# Patient Record
Sex: FEMALE | Race: BLACK OR AFRICAN AMERICAN
[De-identification: names, ages, dates, MRNs, and addresses within clinical notes are randomized per-mention and may not be internally consistent; named-entity substitution may affect disease eponyms.]

---

## 2017-03-24 ENCOUNTER — HOSPITAL ENCOUNTER (OUTPATIENT)
Dept: HOSPITAL 23 - CCAT | Age: 52
Discharge: HOME | End: 2017-03-24
Payer: COMMERCIAL

## 2017-03-24 DIAGNOSIS — R31.29: Primary | ICD-10-CM

## 2017-03-24 DIAGNOSIS — R10.9: ICD-10-CM

## 2022-02-23 ENCOUNTER — OFFICE VISIT (OUTPATIENT)
Dept: FAMILY MEDICINE CLINIC | Facility: CLINIC | Age: 57
End: 2022-02-23

## 2022-02-23 VITALS
DIASTOLIC BLOOD PRESSURE: 80 MMHG | OXYGEN SATURATION: 99 % | HEART RATE: 83 BPM | HEIGHT: 64 IN | BODY MASS INDEX: 46.44 KG/M2 | WEIGHT: 272 LBS | SYSTOLIC BLOOD PRESSURE: 140 MMHG

## 2022-02-23 DIAGNOSIS — Z11.59 ENCOUNTER FOR HEPATITIS C SCREENING TEST FOR LOW RISK PATIENT: ICD-10-CM

## 2022-02-23 DIAGNOSIS — R10.2 PELVIC PAIN: Primary | ICD-10-CM

## 2022-02-23 DIAGNOSIS — E66.01 CLASS 3 SEVERE OBESITY DUE TO EXCESS CALORIES WITHOUT SERIOUS COMORBIDITY WITH BODY MASS INDEX (BMI) OF 45.0 TO 49.9 IN ADULT: ICD-10-CM

## 2022-02-23 PROBLEM — E78.2 MIXED HYPERLIPIDEMIA: Status: ACTIVE | Noted: 2020-02-20

## 2022-02-23 PROBLEM — E78.5 HYPERLIPIDEMIA: Status: ACTIVE | Noted: 2020-02-20

## 2022-02-23 PROBLEM — N95.0 POSTMENOPAUSAL BLEEDING: Status: ACTIVE | Noted: 2017-10-18

## 2022-02-23 PROBLEM — J30.1 SEASONAL ALLERGIC RHINITIS DUE TO POLLEN: Status: ACTIVE | Noted: 2019-01-22

## 2022-02-23 PROBLEM — M25.569 KNEE PAIN: Status: ACTIVE | Noted: 2018-01-25

## 2022-02-23 PROBLEM — K21.9 GASTROESOPHAGEAL REFLUX DISEASE: Status: ACTIVE | Noted: 2021-08-04

## 2022-02-23 PROBLEM — J30.9 ALLERGIC RHINITIS: Status: ACTIVE | Noted: 2019-01-22

## 2022-02-23 PROBLEM — E66.813 CLASS 3 SEVERE OBESITY DUE TO EXCESS CALORIES WITHOUT SERIOUS COMORBIDITY WITH BODY MASS INDEX (BMI) OF 45.0 TO 49.9 IN ADULT: Status: ACTIVE | Noted: 2022-02-23

## 2022-02-23 PROCEDURE — 99203 OFFICE O/P NEW LOW 30 MIN: CPT | Performed by: FAMILY MEDICINE

## 2022-02-23 RX ORDER — AZELASTINE HYDROCHLORIDE 0.5 MG/ML
SOLUTION/ DROPS OPHTHALMIC
COMMUNITY
Start: 2021-10-29

## 2022-02-23 RX ORDER — MELATONIN
COMMUNITY
Start: 2022-02-09

## 2022-02-23 RX ORDER — MELOXICAM 15 MG/1
15 TABLET ORAL
COMMUNITY
Start: 2021-10-28 | End: 2022-10-28

## 2022-02-23 RX ORDER — KETOTIFEN FUMARATE 0.35 MG/ML
SOLUTION/ DROPS OPHTHALMIC
COMMUNITY

## 2022-02-23 RX ORDER — MOMETASONE FUROATE 1 MG/G
CREAM TOPICAL DAILY
COMMUNITY
Start: 2021-08-04 | End: 2022-08-04

## 2022-02-23 NOTE — PROGRESS NOTES
"Chief Complaint  Abdominal Pain (pelvic pain, new pt)    Subjective    History of Present Illness {CC  Problem List  Visit  Diagnosis   Encounters  Notes  Medications  Labs  Result Review Imaging  Media :23}     Colette Whitlock presents to [facility] for Abdominal Pain (pelvic pain, new pt).  History of Present Illness     Here today to establish care and with complaint of abdominal/pelvic pain.    Has had some intermittent lower abdominal pelvic pain for several months now. Denies any dysuria, no vaginal discharge. States that she has had similar symptoms in the past, went away with treatment of metronidazole. Has not had periods for many years now though notes that the pain somewhat mirrors menstrual cramps.    Has not had routine blood work done in quite some time, interested in doing some today. Does have a history of hyperlipidemia. Is overdue for routine health screening.    Weight is stable, has been high for quite some time. Does not get much routine exercise.    Objective     Vital Signs:   /80 (BP Location: Left arm, Patient Position: Sitting)   Pulse 83   Ht 162.6 cm (64\")   Wt 123 kg (272 lb)   SpO2 99%   BMI 46.69 kg/m²   Physical Exam  Vitals and nursing note reviewed.   Constitutional:       General: She is not in acute distress.     Appearance: Normal appearance. She is not ill-appearing.   Cardiovascular:      Rate and Rhythm: Normal rate and regular rhythm.      Pulses: Normal pulses.      Heart sounds: Normal heart sounds. No murmur heard.      Pulmonary:      Effort: Pulmonary effort is normal. No respiratory distress.      Breath sounds: Normal breath sounds. No rales.   Neurological:      Mental Status: She is alert and oriented to person, place, and time. Mental status is at baseline.   Psychiatric:         Mood and Affect: Mood normal.         Behavior: Behavior normal.          Result Review  Data Reviewed:{ Labs  Result Review  Imaging "  Med Tab  Media :23}                   Assessment and Plan {CC Problem List  Visit Diagnosis  ROS  Review (Popup)  Health Maintenance  Quality  BestPractice  Medications  SmartSets  SnapShot Encounters  Media :23}   Diagnoses and all orders for this visit:    1. Pelvic pain (Primary)  -     Cancel: Urinalysis With Microscopic - Urine, Clean Catch; Future  -     Urinalysis With Microscopic - Urine, Clean Catch  -     Cancel: NuSwab VG+ - Swab, Vagina; Future  -     NuSwab VG+ - Swab, Vagina    2. Class 3 severe obesity due to excess calories without serious comorbidity with body mass index (BMI) of 45.0 to 49.9 in adult (HCC)  -     Comprehensive Metabolic Panel  -     Lipid Panel    3. Encounter for hepatitis C screening test for low risk patient  -     Hepatitis C Antibody    Orders as above. I will contact her with results as available. Might need to consider transvaginal ultrasound pending on the results of the labs.    Recommended follow-up as below. Encouraged communication via MyWealtht in the meantime.      Follow Up {Instructions Charge Capture  Follow-up Communications :23}     Patient was given instructions and counseling regarding her condition or for health maintenance advice. Please see specific information pulled into the AVS (placed there by myself) if appropriate.    Return in 1 month (on 3/23/2022), or if symptoms worsen or fail to improve, for Preventive Health Maintenance.      DEISY Mcclendon MD

## 2022-02-24 LAB
ALBUMIN SERPL-MCNC: 4.3 G/DL (ref 3.8–4.9)
ALBUMIN/GLOB SERPL: 1.5 {RATIO} (ref 1.2–2.2)
ALP SERPL-CCNC: 123 IU/L (ref 44–121)
ALT SERPL-CCNC: 18 IU/L (ref 0–32)
APPEARANCE UR: ABNORMAL
AST SERPL-CCNC: 19 IU/L (ref 0–40)
BACTERIA #/AREA URNS HPF: ABNORMAL /[HPF]
BILIRUB SERPL-MCNC: <0.2 MG/DL (ref 0–1.2)
BILIRUB UR QL STRIP: NEGATIVE
BUN SERPL-MCNC: 22 MG/DL (ref 6–24)
BUN/CREAT SERPL: 28 (ref 9–23)
CALCIUM SERPL-MCNC: 9.6 MG/DL (ref 8.7–10.2)
CASTS URNS QL MICRO: ABNORMAL /LPF
CHLORIDE SERPL-SCNC: 105 MMOL/L (ref 96–106)
CHOLEST SERPL-MCNC: 198 MG/DL (ref 100–199)
CO2 SERPL-SCNC: 21 MMOL/L (ref 20–29)
COLOR UR: YELLOW
CREAT SERPL-MCNC: 0.79 MG/DL (ref 0.57–1)
EPI CELLS #/AREA URNS HPF: ABNORMAL /HPF (ref 0–10)
GLOBULIN SER CALC-MCNC: 2.9 G/DL (ref 1.5–4.5)
GLUCOSE SERPL-MCNC: 104 MG/DL (ref 65–99)
GLUCOSE UR QL STRIP: NEGATIVE
HCV AB S/CO SERPL IA: <0.1 S/CO RATIO (ref 0–0.9)
HDLC SERPL-MCNC: 55 MG/DL
HGB UR QL STRIP: ABNORMAL
KETONES UR QL STRIP: ABNORMAL
LDLC SERPL CALC-MCNC: 122 MG/DL (ref 0–99)
LEUKOCYTE ESTERASE UR QL STRIP: NEGATIVE
MICRO URNS: ABNORMAL
NITRITE UR QL STRIP: NEGATIVE
PH UR STRIP: 5 [PH] (ref 5–7.5)
POTASSIUM SERPL-SCNC: 4.3 MMOL/L (ref 3.5–5.2)
PROT SERPL-MCNC: 7.2 G/DL (ref 6–8.5)
PROT UR QL STRIP: NEGATIVE
RBC #/AREA URNS HPF: ABNORMAL /HPF (ref 0–2)
SODIUM SERPL-SCNC: 142 MMOL/L (ref 134–144)
SP GR UR STRIP: 1.03 (ref 1–1.03)
TRIGL SERPL-MCNC: 116 MG/DL (ref 0–149)
UROBILINOGEN UR STRIP-MCNC: 0.2 MG/DL (ref 0.2–1)
VLDLC SERPL CALC-MCNC: 21 MG/DL (ref 5–40)
WBC #/AREA URNS HPF: ABNORMAL /HPF (ref 0–5)

## 2022-02-25 ENCOUNTER — PATIENT ROUNDING (BHMG ONLY) (OUTPATIENT)
Dept: FAMILY MEDICINE CLINIC | Facility: CLINIC | Age: 57
End: 2022-02-25

## 2022-02-25 LAB
A VAGINAE DNA VAG QL NAA+PROBE: NORMAL SCORE
BVAB2 DNA VAG QL NAA+PROBE: NORMAL SCORE
C ALBICANS DNA VAG QL NAA+PROBE: NEGATIVE
C GLABRATA DNA VAG QL NAA+PROBE: NEGATIVE
C TRACH DNA VAG QL NAA+PROBE: NORMAL
MEGA1 DNA VAG QL NAA+PROBE: NORMAL SCORE
N GONORRHOEA DNA VAG QL NAA+PROBE: NORMAL
T VAGINALIS DNA VAG QL NAA+PROBE: NORMAL

## 2022-02-25 NOTE — PROGRESS NOTES
February 25, 2022    Hello, may I speak with Colette Whitlock?    My name is Kirti    I am  with [facility]  [address] [facility] [address] [phone].    Before we get started may I verify your date of birth? 1965    I am calling to officially welcome you to our practice and ask about your recent visit. Is this a good time to talk? yes    Tell me about your visit with us. What things went well?  Pt stated that everyone was wonderful and that people were very nice.        We're always looking for ways to make our patients' experiences even better. Do you have recommendations on ways we may improve?  no    Overall were you satisfied with your first visit to our practice? yes       I appreciate you taking the time to speak with me today. Is there anything else I can do for you? no      Thank you, and have a great day.

## 2022-06-30 ENCOUNTER — TELEPHONE (OUTPATIENT)
Dept: FAMILY MEDICINE CLINIC | Facility: CLINIC | Age: 57
End: 2022-06-30

## 2022-06-30 NOTE — TELEPHONE ENCOUNTER
Caller: NYDIA SCHAEFER     Relationship: [unfilled]     Best call back number: [phone]    What is your medical concern? PATIENT HAS RASH ON ARMS, HANDS, AND STOMACH, IS VERY ITCHY AND SEEMS TO BE SPREADING    How long has this issue been going on? 5 DAYS     Is your provider already aware of this issue? NO    Have you been treated for this issue? HAS TRIED ALLERGY MEDICATION AND CREAMS     PATIENT CANNOT COME IN BEFORE 1PM AND DOES NOT DO MYCHART VISITS

## 2022-08-31 ENCOUNTER — OFFICE VISIT (OUTPATIENT)
Dept: FAMILY MEDICINE CLINIC | Facility: CLINIC | Age: 57
End: 2022-08-31

## 2022-08-31 VITALS
DIASTOLIC BLOOD PRESSURE: 78 MMHG | HEART RATE: 78 BPM | WEIGHT: 256 LBS | BODY MASS INDEX: 43.94 KG/M2 | RESPIRATION RATE: 18 BRPM | OXYGEN SATURATION: 99 % | SYSTOLIC BLOOD PRESSURE: 128 MMHG

## 2022-08-31 DIAGNOSIS — E66.01 CLASS 3 SEVERE OBESITY DUE TO EXCESS CALORIES WITHOUT SERIOUS COMORBIDITY WITH BODY MASS INDEX (BMI) OF 40.0 TO 44.9 IN ADULT: ICD-10-CM

## 2022-08-31 DIAGNOSIS — E78.2 MIXED HYPERLIPIDEMIA: Primary | ICD-10-CM

## 2022-08-31 PROBLEM — M19.171 POST-TRAUMATIC OSTEOARTHRITIS, RIGHT ANKLE AND FOOT: Status: ACTIVE | Noted: 2022-07-13

## 2022-08-31 PROCEDURE — 99213 OFFICE O/P EST LOW 20 MIN: CPT | Performed by: FAMILY MEDICINE

## 2022-08-31 NOTE — PROGRESS NOTES
Chief Complaint  Hyperlipidemia    Subjective    History of Present Illness {CC  Problem List  Visit  Diagnosis   Encounters  Notes  Medications  Labs  Result Review Imaging  Media :23}     Colette Whitlock presents to [facility] for Hyperlipidemia.  History of Present Illness     Here with concern for hyperlipidemia after some recent biometric testing. Was found to have elevated total cholesterol and LDL cholesterol. Neither is terribly high, she blames it on recent dietary issues. Has been drinking some sugared drinks, eats a fair amount of brown rice. Daughter, who is with her today, assures me that they are working on dietary changes. Their concern is that they need to start medications right away. Also had an A1c of 5.8, has been prediabetic in the past.    Objective     Vital Signs:   /78   Pulse 78   Resp 18   Wt 116 kg (256 lb)   SpO2 99%   BMI 43.94 kg/m²   Physical Exam  Vitals and nursing note reviewed.   Constitutional:       General: She is not in acute distress.     Appearance: Normal appearance. She is not ill-appearing.   Cardiovascular:      Rate and Rhythm: Normal rate and regular rhythm.      Pulses: Normal pulses.      Heart sounds: Normal heart sounds. No murmur heard.  Pulmonary:      Effort: Pulmonary effort is normal. No respiratory distress.      Breath sounds: Normal breath sounds. No rales.   Neurological:      Mental Status: She is alert and oriented to person, place, and time. Mental status is at baseline.   Psychiatric:         Mood and Affect: Mood normal.         Behavior: Behavior normal.          Result Review  Data Reviewed:{ Labs  Result Review  Imaging  Med Tab  Media :23}                   Assessment and Plan {CC Problem List  Visit Diagnosis  ROS  Review (Popup)  Health Maintenance  Quality  BestPractice  Medications  SmartSets  SnapShot Encounters  Media :23}   Diagnoses and all orders for this visit:    1.  Mixed hyperlipidemia (Primary)    2. Class 3 severe obesity due to excess calories without serious comorbidity with body mass index (BMI) of 40.0 to 44.9 in adult (HCC)    Long discussion today about the interplay between diet and exercise and her concerns. Strongly recommended improving her risk factors through diet and exercise as she can reverse these processes and avoid medications.    Recommended follow-up as below. Encouraged communication via MyChart in the meantime.    Patient was given instructions and counseling regarding her condition or for health maintenance advice. Please see specific information pulled into the AVS (placed there by myself) if appropriate.    Return in about 3 months (around 11/30/2022), or if symptoms worsen or fail to improve, for Preventive Health Maintenance.      DEISY Mcclendon MD

## 2022-12-01 ENCOUNTER — OFFICE VISIT (OUTPATIENT)
Dept: FAMILY MEDICINE CLINIC | Facility: CLINIC | Age: 57
End: 2022-12-01

## 2022-12-01 VITALS
BODY MASS INDEX: 42.17 KG/M2 | HEART RATE: 70 BPM | HEIGHT: 64 IN | OXYGEN SATURATION: 97 % | WEIGHT: 247 LBS | SYSTOLIC BLOOD PRESSURE: 125 MMHG | DIASTOLIC BLOOD PRESSURE: 80 MMHG

## 2022-12-01 DIAGNOSIS — E66.01 CLASS 3 SEVERE OBESITY DUE TO EXCESS CALORIES WITHOUT SERIOUS COMORBIDITY WITH BODY MASS INDEX (BMI) OF 40.0 TO 44.9 IN ADULT: Primary | ICD-10-CM

## 2022-12-01 PROCEDURE — 99213 OFFICE O/P EST LOW 20 MIN: CPT | Performed by: FAMILY MEDICINE

## 2022-12-01 NOTE — PROGRESS NOTES
"Chief Complaint  Follow-up ([facility])    Subjective    History of Present Illness {CC  Problem List  Visit  Diagnosis   Encounters  Notes  Medications  Labs  Result Review Imaging  Media :23}     Colette Whitlock presents to [facility] for Follow-up ([facility]).  History of Present Illness     Here today to follow-up on obesity. Has made significant changes to her diet and is proud that she has lost about 35 pounds this year. Hopeful to continue the trend. Has been eating increased fibrous vegetables and has decreased excess sugars. Will be due for an annual exam in the near future, would like to get some routine blood work checked at that time. No other concerns today.    Objective     Vital Signs:   /80   Pulse 70   Ht 162.6 cm (64\")   Wt 112 kg (247 lb)   SpO2 97%   BMI 42.40 kg/m²   Physical Exam  Vitals and nursing note reviewed.   Constitutional:       General: She is not in acute distress.     Appearance: Normal appearance. She is not ill-appearing.   Cardiovascular:      Rate and Rhythm: Normal rate and regular rhythm.      Pulses: Normal pulses.      Heart sounds: Normal heart sounds. No murmur heard.  Pulmonary:      Effort: Pulmonary effort is normal. No respiratory distress.      Breath sounds: Normal breath sounds. No rales.   Neurological:      Mental Status: She is alert and oriented to person, place, and time. Mental status is at baseline.   Psychiatric:         Mood and Affect: Mood normal.         Behavior: Behavior normal.          Result Review  Data Reviewed:{ Labs  Result Review  Imaging  Med Tab  Media :23}                   Assessment and Plan {CC Problem List  Visit Diagnosis  ROS  Review (Popup)  Health Maintenance  Quality  BestPractice  Medications  SmartSets  SnapShot Encounters  Media :23}   Diagnoses and all orders for this visit:    1. Class 3 severe obesity due to excess calories without serious comorbidity with body mass " index (BMI) of 40.0 to 44.9 in adult (HCC) (Primary)    Congratulated on her progress, encouraged to continue. Recommended follow-up as below. Encouraged communication via MyChart in the meantime.    Patient was given instructions and counseling regarding her condition or for health maintenance advice. Please see specific information pulled into the AVS (placed there by myself) if appropriate.    Return in about 3 months (around 3/1/2023) for Preventive Health Maintenance.      DEISY Mcclendon MD

## 2023-03-16 ENCOUNTER — OFFICE VISIT (OUTPATIENT)
Dept: FAMILY MEDICINE CLINIC | Facility: CLINIC | Age: 58
End: 2023-03-16
Payer: COMMERCIAL

## 2023-03-16 VITALS
SYSTOLIC BLOOD PRESSURE: 122 MMHG | HEART RATE: 68 BPM | BODY MASS INDEX: 41.88 KG/M2 | OXYGEN SATURATION: 98 % | WEIGHT: 244 LBS | DIASTOLIC BLOOD PRESSURE: 72 MMHG | RESPIRATION RATE: 19 BRPM

## 2023-03-16 DIAGNOSIS — Z00.00 ROUTINE HEALTH MAINTENANCE: Primary | ICD-10-CM

## 2023-03-16 DIAGNOSIS — Z12.11 SCREENING FOR COLON CANCER: ICD-10-CM

## 2023-03-16 DIAGNOSIS — Z13.1 SCREENING FOR DIABETES MELLITUS: ICD-10-CM

## 2023-03-16 DIAGNOSIS — Z12.31 ENCOUNTER FOR SCREENING MAMMOGRAM FOR MALIGNANT NEOPLASM OF BREAST: ICD-10-CM

## 2023-03-16 DIAGNOSIS — E78.2 MIXED HYPERLIPIDEMIA: ICD-10-CM

## 2023-03-16 PROCEDURE — 99396 PREV VISIT EST AGE 40-64: CPT | Performed by: FAMILY MEDICINE

## 2023-03-16 NOTE — PROGRESS NOTES
Chief Complaint  Annual Exam    Subjective    History of Present Illness {CC  Problem List  Visit  Diagnosis   Encounters  Notes  Medications  Labs  Result Review Imaging  Media :23}     Colette Whitlock presents to [facility] for Annual Exam.  History of Present Illness     Here today for annual exam and discuss preventive health maintenance.    Doing very well today. Very happy with the progress that she has made. Has lost almost 30 pounds now. Very excited to see what her labs look like.    Due for his routine blood work today. Also due for mammogram and colon cancer screening. Is low risk for the latter with no family history and no significant signs or symptoms. Would prefer Cologuard if possible.    Continues to work on diet and exercise. Again happy with her progress.    Has good family and social support. 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    Objective     Vital Signs:   /72   Pulse 68   Resp 19   Wt 111 kg (244 lb)   SpO2 98%   BMI 41.88 kg/m²   Physical Exam  Vitals and nursing note reviewed.   Constitutional:       General: She is not in acute distress.     Appearance: Normal appearance. She is not ill-appearing.   Cardiovascular:      Rate and Rhythm: Normal rate and regular rhythm.      Pulses: Normal pulses.      Heart sounds: Normal heart sounds. No murmur heard.  Pulmonary:      Effort: Pulmonary effort is normal. No respiratory distress.      Breath sounds: Normal breath sounds. No rales.   Neurological:      Mental Status: She is alert and oriented to person, place, and time. Mental status is at baseline.   Psychiatric:         Mood and Affect: Mood normal.         Behavior: Behavior normal.          Result Review  Data Reviewed:{ Labs  Result Review  Imaging  Med Tab  Media :23}                   Assessment and Plan {CC Problem List  Visit Diagnosis  ROS  Review (Popup)  Health Maintenance  Quality  BestPractice  Medications   [employer]  SnapShot Encounters  Media :23}   Diagnoses and all orders for this visit:    1. Routine health maintenance (Primary)    2. Mixed hyperlipidemia  -     Comprehensive Metabolic Panel  -     Lipid Panel    3. Screening for diabetes mellitus  -     Comprehensive Metabolic Panel    4. Encounter for screening mammogram for malignant neoplasm of breast  -     Mammo Screening Bilateral With CAD; Future    5. Screening for colon cancer  -     Cologuard - Stool, Per Rectum; Future    Orders as above. I will contact her with results as available. Continue with excellent changes to lifestyle.    Recommended follow-up as below. Encouraged communication via Nimayat in the meantime.    Patient was given instructions and counseling regarding her condition or for health maintenance advice. Please see specific information pulled into the AVS (placed there by myself) if appropriate.    Return in about 1 year (around 3/16/2024) for Preventive Health Maintenance.      DEISY Mcclendon MD    Prevention counseling performed as below: Mindfulness for stress management.

## 2023-03-17 LAB
ALBUMIN SERPL-MCNC: 4.4 G/DL (ref 3.8–4.9)
ALBUMIN/GLOB SERPL: 1.7 {RATIO} (ref 1.2–2.2)
ALP SERPL-CCNC: 97 IU/L (ref 44–121)
ALT SERPL-CCNC: 16 IU/L (ref 0–32)
AST SERPL-CCNC: 15 IU/L (ref 0–40)
BILIRUB SERPL-MCNC: 0.2 MG/DL (ref 0–1.2)
BUN SERPL-MCNC: 21 MG/DL (ref 6–24)
BUN/CREAT SERPL: 26 (ref 9–23)
CALCIUM SERPL-MCNC: 9.5 MG/DL (ref 8.7–10.2)
CHLORIDE SERPL-SCNC: 106 MMOL/L (ref 96–106)
CHOLEST SERPL-MCNC: 181 MG/DL (ref 100–199)
CO2 SERPL-SCNC: 22 MMOL/L (ref 20–29)
CREAT SERPL-MCNC: 0.81 MG/DL (ref 0.57–1)
EGFRCR SERPLBLD CKD-EPI 2021: 84 ML/MIN/1.73
GLOBULIN SER CALC-MCNC: 2.6 G/DL (ref 1.5–4.5)
GLUCOSE SERPL-MCNC: 88 MG/DL (ref 70–99)
HDLC SERPL-MCNC: 54 MG/DL
LDLC SERPL CALC-MCNC: 112 MG/DL (ref 0–99)
POTASSIUM SERPL-SCNC: 4.4 MMOL/L (ref 3.5–5.2)
PROT SERPL-MCNC: 7 G/DL (ref 6–8.5)
SODIUM SERPL-SCNC: 142 MMOL/L (ref 134–144)
TRIGL SERPL-MCNC: 83 MG/DL (ref 0–149)
VLDLC SERPL CALC-MCNC: 15 MG/DL (ref 5–40)

## 2023-04-04 ENCOUNTER — TRANSCRIBE ORDERS (OUTPATIENT)
Dept: ADMINISTRATIVE | Facility: HOSPITAL | Age: 58
End: 2023-04-04
Payer: COMMERCIAL

## 2023-04-04 DIAGNOSIS — Z12.31 SCREENING MAMMOGRAM, ENCOUNTER FOR: Primary | ICD-10-CM

## 2023-04-11 ENCOUNTER — HOSPITAL ENCOUNTER (OUTPATIENT)
Dept: MAMMOGRAPHY | Facility: HOSPITAL | Age: 58
Discharge: HOME | End: 2023-04-11
Admitting: FAMILY MEDICINE
Payer: COMMERCIAL

## 2023-04-11 DIAGNOSIS — Z12.31 SCREENING MAMMOGRAM, ENCOUNTER FOR: ICD-10-CM

## 2023-04-11 PROCEDURE — 77063 BREAST TOMOSYNTHESIS BI: CPT

## 2023-04-11 PROCEDURE — 77067 SCR MAMMO BI INCL CAD: CPT

## 2023-04-19 ENCOUNTER — OFFICE VISIT (OUTPATIENT)
Dept: FAMILY MEDICINE CLINIC | Facility: CLINIC | Age: 58
End: 2023-04-19
Payer: COMMERCIAL

## 2023-04-19 VITALS
WEIGHT: 243 LBS | HEART RATE: 70 BPM | OXYGEN SATURATION: 98 % | DIASTOLIC BLOOD PRESSURE: 72 MMHG | BODY MASS INDEX: 41.71 KG/M2 | SYSTOLIC BLOOD PRESSURE: 122 MMHG | RESPIRATION RATE: 19 BRPM

## 2023-04-19 DIAGNOSIS — R05.3 CHRONIC COUGH: Primary | ICD-10-CM

## 2023-04-19 PROCEDURE — 99213 OFFICE O/P EST LOW 20 MIN: CPT | Performed by: FAMILY MEDICINE

## 2023-04-19 RX ORDER — BENZONATATE 100 MG/1
100 CAPSULE ORAL 3 TIMES DAILY PRN
Qty: 30 CAPSULE | Refills: 0 | Status: SHIPPED | OUTPATIENT
Start: 2023-04-19

## 2023-04-19 RX ORDER — MONTELUKAST SODIUM 10 MG/1
10 TABLET ORAL NIGHTLY
Qty: 30 TABLET | Refills: 0 | Status: SHIPPED | OUTPATIENT
Start: 2023-04-19

## 2023-04-19 NOTE — PROGRESS NOTES
Chief Complaint  Cough (Off and on since March )    Subjective    History of Present Illness {CC  Problem List  Visit  Diagnosis   Encounters  Notes  Medications  Labs  Result Review Imaging  Media :23}     Colette Whitlock presents to [facility] for Cough (Off and on since March ).  History of Present Illness     Here today with complaint as above. Cough started after an upper respiratory infection in early March. Has continued since, most frequently a dry nagging cough. Seems to be getting very slowly better however there is some concern about possible bronchitis or pneumonia. No fevers or chills, no shortness of breath. Does have some mild allergy symptoms and has been treated for these in the past. No reflux or wheezing.    Objective     Vital Signs:   /72   Pulse 70   Resp 19   Wt 110 kg (243 lb)   SpO2 98%   BMI 41.71 kg/m²   Physical Exam  Vitals and nursing note reviewed.   Constitutional:       General: She is not in acute distress.     Appearance: Normal appearance. She is not ill-appearing.   Cardiovascular:      Rate and Rhythm: Normal rate and regular rhythm.      Pulses: Normal pulses.      Heart sounds: Normal heart sounds. No murmur heard.  Pulmonary:      Effort: Pulmonary effort is normal. No respiratory distress.      Breath sounds: Normal breath sounds. No stridor. No wheezing or rhonchi.   Neurological:      Mental Status: She is alert.          Result Review  Data Reviewed:{ Labs  Result Review  Imaging  Med Tab  Media :23}                   Assessment and Plan {CC Problem List  Visit Diagnosis  ROS  Review (Popup)  Health Maintenance  Quality  BestPractice  Medications  SmartSets  SnapShot Encounters  Media :23}   Diagnoses and all orders for this visit:    1. Chronic cough (Primary)  -     montelukast (Singulair) 10 MG tablet; Take 1 tablet by mouth Every Night.  Dispense: 30 tablet; Refill: 0  -     benzonatate (Tessalon  Perles) 100 MG capsule; Take 1 capsule by mouth 3 (Three) Times a Day As Needed for Cough.  Dispense: 30 capsule; Refill: 0    Will treat with benzonatate and montelukast as above. We will do montelukast for a month and then can discuss possible chronic use. She will keep me updated with her progress.    Patient was given instructions and counseling regarding her condition or for health maintenance advice. Please see specific information pulled into the AVS (placed there by myself) if appropriate.    Return if symptoms worsen or fail to improve.      DEISY Mcclendon MD

## 2023-05-12 DIAGNOSIS — R05.3 CHRONIC COUGH: ICD-10-CM

## 2023-05-12 RX ORDER — MONTELUKAST SODIUM 10 MG/1
TABLET ORAL
Qty: 30 TABLET | Refills: 0 | Status: SHIPPED | OUTPATIENT
Start: 2023-05-12

## 2023-06-12 DIAGNOSIS — R05.3 CHRONIC COUGH: ICD-10-CM

## 2023-06-12 RX ORDER — MONTELUKAST SODIUM 10 MG/1
TABLET ORAL
Qty: 30 TABLET | Refills: 0 | Status: SHIPPED | OUTPATIENT
Start: 2023-06-12

## 2023-07-27 DIAGNOSIS — R05.3 CHRONIC COUGH: ICD-10-CM

## 2023-07-27 RX ORDER — MONTELUKAST SODIUM 10 MG/1
10 TABLET ORAL
Qty: 90 TABLET | Refills: 3 | Status: SHIPPED | OUTPATIENT
Start: 2023-07-27

## 2023-08-17 ENCOUNTER — OFFICE VISIT (OUTPATIENT)
Dept: FAMILY MEDICINE CLINIC | Facility: CLINIC | Age: 58
End: 2023-08-17
Payer: COMMERCIAL

## 2023-08-17 VITALS
HEART RATE: 62 BPM | DIASTOLIC BLOOD PRESSURE: 82 MMHG | BODY MASS INDEX: 40.97 KG/M2 | WEIGHT: 240 LBS | SYSTOLIC BLOOD PRESSURE: 122 MMHG | OXYGEN SATURATION: 98 % | RESPIRATION RATE: 20 BRPM | HEIGHT: 64 IN

## 2023-08-17 DIAGNOSIS — R10.9 ABDOMINAL CRAMPING: Primary | ICD-10-CM

## 2023-08-17 PROCEDURE — 99213 OFFICE O/P EST LOW 20 MIN: CPT | Performed by: FAMILY MEDICINE

## 2023-08-17 NOTE — PROGRESS NOTES
"Chief Complaint  Abdominal Pain and Labs Only (Abnormal biometric screening )    Subjective    History of Present Illness {CC  Problem List  Visit  Diagnosis   Encounters  Notes  Medications  Labs  Result Review Imaging  Media :23}     Colette Whitlock presents to [facility] for Abdominal Pain and Labs Only (Abnormal biometric screening ).  History of Present Illness     Here today with concern as above. Has been doing fairly well overall. Continues to encounter some occasional abdominal discomfort and intermittent constipation and diarrhea. Has not been able to identify any specific triggers. Has normal bowel movements 90% of the time. Occasionally has some abdominal cramping followed by some diarrhea. Continues to eat a vegetable and fiber heavy diet.    Had some biometric screening done at work. A few labs were slightly abnormal and she is wondering my opinion. Cholesterol is overall stable as is her fasting glucose.    Objective     Vital Signs:   /82   Pulse 62   Resp 20   Ht 162.6 cm (64\")   Wt 109 kg (240 lb)   SpO2 98%   BMI 41.20 kg/mý   Physical Exam  Vitals and nursing note reviewed.   Constitutional:       General: She is not in acute distress.     Appearance: Normal appearance. She is not ill-appearing.   Cardiovascular:      Rate and Rhythm: Normal rate and regular rhythm.      Pulses: Normal pulses.      Heart sounds: Normal heart sounds. No murmur heard.  Pulmonary:      Effort: Pulmonary effort is normal. No respiratory distress.      Breath sounds: Normal breath sounds. No rales.   Neurological:      Mental Status: She is alert and oriented to person, place, and time. Mental status is at baseline.   Psychiatric:         Mood and Affect: Mood normal.         Behavior: Behavior normal.        Result Review  Data Reviewed:{ Labs  Result Review  Imaging  Med Tab  Media :23}                   Assessment and Plan {CC Problem List  Visit " Diagnosis  ROS  Review (Popup)  Health Maintenance  Quality  BestPractice  Medications  SmartSets  SnapShot Encounters  Media :23}   Diagnoses and all orders for this visit:    1. Abdominal cramping (Primary)    My guess is that her abdominal cramping and bloating are diet related. Recommended a symptom diary so that we may further characterize the issue and identify triggers.    Recommended follow-up as below. Encouraged communication via ViShart in the meantime.          Patient was given instructions and counseling regarding her condition or for health maintenance advice. Please see specific information pulled into the AVS (placed there by myself) if appropriate.    Return if symptoms worsen or fail to improve.      DEISY Mcclendon MD

## 2024-01-08 ENCOUNTER — OFFICE VISIT (OUTPATIENT)
Dept: FAMILY MEDICINE CLINIC | Facility: CLINIC | Age: 59
End: 2024-01-08
Payer: COMMERCIAL

## 2024-01-08 VITALS
SYSTOLIC BLOOD PRESSURE: 148 MMHG | RESPIRATION RATE: 18 BRPM | HEART RATE: 97 BPM | OXYGEN SATURATION: 99 % | BODY MASS INDEX: 42 KG/M2 | HEIGHT: 64 IN | DIASTOLIC BLOOD PRESSURE: 92 MMHG | WEIGHT: 246 LBS

## 2024-01-08 DIAGNOSIS — M25.511 CHRONIC RIGHT SHOULDER PAIN: Primary | ICD-10-CM

## 2024-01-08 DIAGNOSIS — G89.29 CHRONIC RIGHT SHOULDER PAIN: Primary | ICD-10-CM

## 2024-01-08 PROCEDURE — 99214 OFFICE O/P EST MOD 30 MIN: CPT | Performed by: NURSE PRACTITIONER

## 2024-01-08 RX ORDER — MELOXICAM 15 MG/1
15 TABLET ORAL DAILY
Qty: 30 TABLET | Refills: 1 | Status: SHIPPED | OUTPATIENT
Start: 2024-01-08 | End: 2024-03-08

## 2024-01-08 RX ORDER — MELOXICAM 15 MG/1
15 TABLET ORAL
COMMUNITY
Start: 2023-12-21 | End: 2024-01-08 | Stop reason: SDUPTHER

## 2024-01-08 NOTE — PROGRESS NOTES
Orly Whitlock is a 58 y.o. female.     History of Present Illness   Patient presents with c/o right shoulder pain, ongoing, acute for a few months. She has tried bracing her arm and applying heat, but this has not helped. She is unable to raise her right arm above her head and it's difficult for her to  objects. She has used castor oil and topical voltaren gel. She takes meloxicam 15 mg 1 p.o. QD  The following portions of the patient's history were reviewed and updated as appropriate: allergies, current medications, past family history, past medical history, past social history, past surgical history and problem list.    Review of Systems   Constitutional:  Negative for chills, fatigue and fever.   Respiratory:  Negative for cough, chest tightness, shortness of breath and wheezing.    Cardiovascular:  Negative for chest pain, palpitations and leg swelling.   Musculoskeletal:  Positive for arthralgias (Right shoulder).   Neurological:  Negative for dizziness and headache.   Hematological: Negative.    Psychiatric/Behavioral: Negative.  Negative for sleep disturbance.        Objective   Physical Exam  Vitals and nursing note reviewed.   Constitutional:       Appearance: She is well-developed.   HENT:      Head: Normocephalic and atraumatic.   Eyes:      Conjunctiva/sclera: Conjunctivae normal.      Pupils: Pupils are equal, round, and reactive to light.   Cardiovascular:      Rate and Rhythm: Normal rate and regular rhythm.      Heart sounds: Normal heart sounds. No murmur heard.  Pulmonary:      Effort: Pulmonary effort is normal.      Breath sounds: Normal breath sounds.   Musculoskeletal:      Right shoulder: Tenderness present. No swelling, deformity, effusion, laceration or crepitus. Decreased range of motion. Normal strength. Normal pulse.   Neurological:      Mental Status: She is alert and oriented to person, place, and time.   Psychiatric:         Behavior: Behavior normal.          Thought Content: Thought content normal.         Judgment: Judgment normal.         Vitals:    01/08/24 1427   BP: 148/92   Pulse: 97   Resp: 18   SpO2: 99%     Body mass index is 42.23 kg/m².      Procedures    Assessment & Plan   Problems Addressed this Visit    None  Visit Diagnoses       Chronic right shoulder pain    -  Primary    Relevant Medications    meloxicam (MOBIC) 15 MG tablet    Diclofenac Sodium (Voltaren Arthritis Pain) 1 % gel gel    Other Relevant Orders    Ambulatory Referral to Orthopedic Surgery          Diagnoses         Codes Comments    Chronic right shoulder pain    -  Primary ICD-10-CM: M25.511, G89.29  ICD-9-CM: 719.41, 338.29           Meloxicam 15 mg 1 p.o. QD  Referral to orthopedic  Topical voltaren gel BID         Return if symptoms worsen or fail to improve.

## 2024-01-12 NOTE — PROGRESS NOTES
New Shoulder      Patient: Colette Whitlock        YOB: 1965    Medical Record Number: [id number]        Chief Complaints:   Right shoulder pain    History of Present Illness: This is a very nice 58-year-old female is right-hand dominant who is from her [place] who speaks very good English but her daughter is here to help translate if needed.  Patient states her shoulders been hurting her for 3 months she is tried ice heat there is not 1 particular event or injury however she does describe to me what she does for work she lifts a lot of things overhead does a lot of folding of close and repetitive activity and I suspect this is her source.  I did relay this to the patient.  Her symptoms are moderate intermittent she is continue to work through it and feels like she can only actively flex to about 90 degrees.  Her past medical history is marked for prediabetes      Allergies:   Allergies   Allergen Reactions    Penicillin G Unknown (See Comments)     Told this as a child       Medications:   Home Medications:  Current Outpatient Medications on File Prior to Visit   Medication Sig    meloxicam (MOBIC) 15 MG tablet Take 1 tablet by mouth Daily for 60 days.    Diclofenac Sodium (Voltaren Arthritis Pain) 1 % gel gel Apply 4 g topically to the appropriate area as directed 2 (Two) Times a Day. (Patient not taking: Reported on 1/15/2024)    ketotifen (ZADITOR) 0.025 % ophthalmic solution Apply  to eye(s) as directed by provider. (Patient not taking: Reported on 1/15/2024)    montelukast (SINGULAIR) 10 MG tablet Take 1 tablet by mouth every night at bedtime. (Patient not taking: Reported on 1/15/2024)    psyllium (Metamucil Smooth Texture) 58.6 % powder 1 large tablespoon mixed in 12-15 ounces of water every morning. (Patient not taking: Reported on 1/15/2024)     No current facility-administered medications on file prior to visit.     Current Medications:  Scheduled Meds:  Continuous Infusions:No current  "facility-administered medications for this visit.    PRN Meds:.    Past Medical History:   Diagnosis Date    Fracture, foot     Frozen shoulder     Knee swelling     Prediabetes         Past Surgical History:   Procedure Laterality Date    ANKLE OPEN REDUCTION INTERNAL FIXATION       SECTION          Social History     Occupational History    Not on file   Tobacco Use    Smoking status: Never    Smokeless tobacco: Never   Substance and Sexual Activity    Alcohol use: Not Currently    Drug use: Never    Sexual activity: Yes     Partners: Male     Comment: , three children      Social History     Social History Narrative    Not on file        Family History   Problem Relation Age of Onset    No Known Problems Mother     No Known Problems Father     Breast cancer Neg Hx     Colon cancer Neg Hx              Review of Systems:     Review of Systems      Physical Exam: 58 y.o. female  General Appearance:    Alert, cooperative, in no acute distress                   Vitals:    01/15/24 1423   Temp: 98.2 °F (36.8 °C)   Weight: 115 kg (253 lb 3.2 oz)   Height: 162.6 cm (64\")   PainSc:   7      Patient is alert and read ×3 no acute distress appears her above-listed at height weight and age.  Affect is normal respiratory rate is normal unlabored. Heart rate regular rate rhythm, sclera, dentition and hearing are normal for the purpose of this exam.    Ortho Exam  Physical exam of the right shoulder reveals no overlying skin changes no lymphedema no lymphadenopathy.  Patient has active flexion 180 with mild symptoms abduction is similar external rotation is to 50 and internal rotation to the upper lumbar spine with mild symptoms.  Patient has good rotator cuff strength 4+ over 5 with isometric strength testing with pain.  Patient has a positive impingement and a positive De Santiago sign.  Patient has good cervical range of motion which is full and asymptomatic no radicular symptoms.  Patient has a normal elbow exam.  " Good distal pulses are present  Patient has pain with overhead activity and a positive Neer sign and a positive empty can sign , a positive drop arm and a definitive painful arc   Large Joint Arthrocentesis: R subacromial bursa  Date/Time: 1/15/2024 2:47 PM  Consent given by: patient  Site marked: site marked  Timeout: Immediately prior to procedure a time out was called to verify the correct patient, procedure, equipment, support staff and site/side marked as required   Supporting Documentation  Indications: pain   Procedure Details  Location: shoulder - R subacromial bursa  Preparation: Patient was prepped and draped in the usual sterile fashion  Needle gauge: 21G.  Approach: posterior  Medications administered: 80 mg methylPREDNISolone acetate 80 MG/ML; 2 mL lidocaine PF 1% 1 %  Patient tolerance: patient tolerated the procedure well with no immediate complications              Radiology:   AP, Scapular Y and Axillary Lateral of the right shoulder were ordered/reviewed to evauate shoulder pain.  I have no comparative films she has some acromioclavicular arthritis otherwise no acute bony pathology  Imaging Results (Most Recent)       Procedure Component Value Units Date/Time    XR Shoulder 2+ View Right [[id number]] Resulted: 01/15/24 1446     Updated: 01/15/24 1448    Impression:      Ordering physician's impression is located in the Encounter Note dated 01/15/24. X-ray performed in the DR room.          Assessment/Plan: Right shoulder pain I think this is rotator cuff hopefully is just significantly inflamed giving rise to loss of active range of motion.  Plan is to proceed with an injection as a diagnostic and therapeutic tool I will then have her see physical therapy to work on cuff and core strengthening I will take her off work at this time because I think she will need that in order to improve.  Should she fail to improve in a lasting fashion with the above listed regimen would consider full-thickness  rotator cuff tear more likely and would do an MRI  Cortisone Injection. See procedure note.  Cortisone Injection for DIAGNOSTIC and THERAPUTIC purposes.

## 2024-01-15 ENCOUNTER — OFFICE VISIT (OUTPATIENT)
Dept: ORTHOPEDIC SURGERY | Facility: CLINIC | Age: 59
End: 2024-01-15
Payer: COMMERCIAL

## 2024-01-15 VITALS — TEMPERATURE: 98.2 F | WEIGHT: 253.2 LBS | BODY MASS INDEX: 43.23 KG/M2 | HEIGHT: 64 IN

## 2024-01-15 DIAGNOSIS — S46.011A TRAUMATIC TEAR OF RIGHT ROTATOR CUFF, UNSPECIFIED TEAR EXTENT, INITIAL ENCOUNTER: Primary | ICD-10-CM

## 2024-01-15 RX ADMIN — LIDOCAINE HYDROCHLORIDE 2 ML: 10 INJECTION, SOLUTION EPIDURAL; INFILTRATION; INTRACAUDAL; PERINEURAL at 14:47

## 2024-01-15 RX ADMIN — METHYLPREDNISOLONE ACETATE 80 MG: 80 INJECTION, SUSPENSION INTRA-ARTICULAR; INTRALESIONAL; INTRAMUSCULAR; SOFT TISSUE at 14:47

## 2024-01-15 NOTE — LETTER
January 15, 2024     Patient: Colette Whitlock   YOB: 1965   Date of Visit: 1/15/2024       To Whom It May Concern:    It is my medical opinion that Colette Whitlock  will be off work until follow up in 4 weeks.           Sincerely,        Makenzie Barnett MD    CC:   No Recipients

## 2024-01-16 ENCOUNTER — PATIENT ROUNDING (BHMG ONLY) (OUTPATIENT)
Dept: ORTHOPEDIC SURGERY | Facility: CLINIC | Age: 59
End: 2024-01-16
Payer: COMMERCIAL

## 2024-01-16 RX ORDER — METHYLPREDNISOLONE ACETATE 80 MG/ML
80 INJECTION, SUSPENSION INTRA-ARTICULAR; INTRALESIONAL; INTRAMUSCULAR; SOFT TISSUE
Status: COMPLETED | OUTPATIENT
Start: 2024-01-15 | End: 2024-01-15

## 2024-01-16 RX ORDER — LIDOCAINE HYDROCHLORIDE 10 MG/ML
2 INJECTION, SOLUTION EPIDURAL; INFILTRATION; INTRACAUDAL; PERINEURAL
Status: COMPLETED | OUTPATIENT
Start: 2024-01-15 | End: 2024-01-15

## 2024-01-16 NOTE — PROGRESS NOTES
A [employer] Message has been sent to the patient for PATIENT ROUNDING with [facility]

## 2024-01-22 ENCOUNTER — TREATMENT (OUTPATIENT)
Dept: PHYSICAL THERAPY | Facility: CLINIC | Age: 59
End: 2024-01-22
Payer: COMMERCIAL

## 2024-01-22 DIAGNOSIS — S46.011D TRAUMATIC TEAR OF RIGHT ROTATOR CUFF, UNSPECIFIED TEAR EXTENT, SUBSEQUENT ENCOUNTER: Primary | ICD-10-CM

## 2024-01-22 NOTE — PROGRESS NOTES
Physical Therapy Initial Evaluation and Plan of Care  [address], [address]  [place] [address]    Patient: Colette Whitlock   : 1965  Diagnosis/ICD-10 Code:  Traumatic tear of right rotator cuff, unspecified tear extent, subsequent encounter [S46.011D]  Referring practitioner: Makenzie Barnett MD  Date of Initial Visit: 2024  Today's Date: 2024  Patient seen for 1 session         Visit Diagnoses:    ICD-10-CM ICD-9-CM   1. Traumatic tear of right rotator cuff, unspecified tear extent, subsequent encounter  S46.011D V58.89     840.4         Subjective Questionnaire: QuickDASH: 77.78      Subjective Evaluation    History of Present Illness  Mechanism of injury: Patient reports that her right shoulder has bothered her for about 3 months.  Denies any injury to the shoulder.  Tried some things at home, but didn't help.  Saw her PCP, then saw the ortho.  Had injection in the right shoulder, which has helped with the pain.    Denies any previous injuries or surgeries.      Patient Occupation: [employer]   Precautions and Work Restrictions: currently not workingPain  Current pain ratin  At worst pain ratin  Location: right anterior shoulder  Quality: unable.  Relieving factors: heat  Aggravating factors: movement    Hand dominance: right             Objective          Observations     Additional Shoulder Observation Details  Patient is in no acute distress.  Minimal protracted shoulders.    Palpation     Additional Palpation Details  Minimal TTP to the right LHB tendon.    Active Range of Motion     Right Shoulder   Flexion: Right shoulder active forward flexion: about 85. with pain  Abduction: Right shoulder active abduction: about 90. with pain  External rotation 0°: Right shoulder active external rotation at 0 degrees: WNL.   Internal rotation BTB: Active internal rotation behind the back: N/T at this time.     Strength/Myotome Testing     Right Shoulder     Planes of Motion    External rotation at 0°: 4   Internal rotation at 45°: 4+     Isolated Muscles   Supraspinatus: 4     Tests     Right Shoulder   Positive empty can and Hawkin's.           Assessment & Plan       Assessment  Impairments: abnormal or restricted ROM, activity intolerance, impaired physical strength, lacks appropriate home exercise program and pain with function   Assessment details: Patient presents with c/o pain, TTP, limited AROM, decreased strength and positive special testing which is limiting her ability to use the right UE with ADL'S.  Barriers to therapy: none  Prognosis: good  Prognosis details: STG's to be met by 2 weeks  1)  Independent with HEP  2)  Decrease pain by 50% or more  3)  AROM right shoulder flexion to 120  4)  AROM right shoulder abd to 120    LTG's to be met by 4 weeks  1)  Independent with HEP progression  2)  Decrease pain by 75% or more  3)  AROM right shoulder flexion and abd to 145 or more to allow for return to functional activities   4)  Increase strength for the right shoulder to 4/5 or more  5)  Negative special testing  6)  No TTP present to indicate improved healing response  7)  Patient to return to ADL'S with minimal limitations         Plan  Therapy options: will be seen for skilled therapy services  Planned therapy interventions: strengthening, stretching, therapeutic activities, home exercise program, manual therapy and neuromuscular re-education  Frequency: 2x week  Duration in weeks: 4  Treatment plan discussed with: patient            Timed:         Manual Therapy:    0     mins  74811;     Therapeutic Exercise:    8     mins  08234;    Neuromuscular Doreen:    8    mins  80138;    Therapeutic Activity:     8     mins  96234;     Gait Trainin     mins  24929;     Ultrasound:     0     mins  99152;          Un-Timed:  Electrical Stimulation:    0     mins  27310 ( );    Low Eval     12     Mins  79670  Mod Eval     0     Mins  35351  High Eval                        0     Mins  48821        Timed Treatment:   24   mins   Total Treatment:     36   mins          PT: Mich Cruz, PT     [place] License [id number]  Electronically signed by Mich Cruz PT, 01/22/24, 9:01 AM EST    Certification Period: 1/22/2024 thru 4/20/2024  I certify that the therapy services are furnished while this patient is under my care.  The services outlined above are required by this patient, and will be reviewed every 90 days.    Makenzie Barnett Md  [address] [place] [address]   NPI: [id number]      Mich Cruz PT   License number: [id number]        Physician Signature:__________________________________________________    PHYSICIAN: Makenzie Barnett MD      DATE:     Please sign and return via fax to [email or web address] . Thank you, [facility].

## 2024-01-24 ENCOUNTER — TREATMENT (OUTPATIENT)
Dept: PHYSICAL THERAPY | Facility: CLINIC | Age: 59
End: 2024-01-24
Payer: COMMERCIAL

## 2024-01-24 DIAGNOSIS — S46.011D TRAUMATIC TEAR OF RIGHT ROTATOR CUFF, UNSPECIFIED TEAR EXTENT, SUBSEQUENT ENCOUNTER: Primary | ICD-10-CM

## 2024-01-29 ENCOUNTER — TREATMENT (OUTPATIENT)
Dept: PHYSICAL THERAPY | Facility: CLINIC | Age: 59
End: 2024-01-29
Payer: COMMERCIAL

## 2024-01-29 DIAGNOSIS — S46.011D TRAUMATIC TEAR OF RIGHT ROTATOR CUFF, UNSPECIFIED TEAR EXTENT, SUBSEQUENT ENCOUNTER: Primary | ICD-10-CM

## 2024-01-29 NOTE — PROGRESS NOTES
"Physical Therapy Daily Treatment Note               [address]                                                                                                                                             [place] [address]    Patient: Colette Whitlock   : 1965  Referring practitioner: Makenzie Barnett MD  Date of Initial Visit: Type: THERAPY  Noted: 2024  Today's Date: 2024  Patient seen for 3 sessions       Visit Diagnoses:    ICD-10-CM ICD-9-CM   1. Traumatic tear of right rotator cuff, unspecified tear extent, subsequent encounter  S46.011D V58.89     840.4       Subjective Evaluation    History of Present Illness    Subjective comment: Pt reports that she still has \"some pain, but not as much as when I started\".  AROM continues to be very limited in flexion and abd.       Objective   See Exercise, Manual, and Modality Logs for complete treatment.       Assessment & Plan       Assessment  Assessment details: Pt completed treatment with mild c/o pain in (R) shoulder.  Pt  c/o pain in (R) shoulder when starting table slides in scaption.  Pt was advised to limit ROM to pain free range when starting and to push further each time. Pt completed with no other c/o pain.    Continue to progress per POC.          Timed:         Manual Therapy:    0     mins  39075;     Therapeutic Exercise:    25     mins  29025;     Neuromuscular Doreen:    0    mins  98891;    Therapeutic Activity:     10     mins  23440;     Gait Trainin     mins  78133;     Ultrasound:     0     mins  47276;    E Stim                            0    mins   98527( [id number])  Work Dougherty/Cond      0    mins   05635        Timed Treatment:   35   mins   Total Treatment:     35   mins    Joni Michaels PTA  [place] License: [id number]  "

## 2024-01-31 ENCOUNTER — TREATMENT (OUTPATIENT)
Dept: PHYSICAL THERAPY | Facility: CLINIC | Age: 59
End: 2024-01-31
Payer: COMMERCIAL

## 2024-01-31 DIAGNOSIS — S46.011D TRAUMATIC TEAR OF RIGHT ROTATOR CUFF, UNSPECIFIED TEAR EXTENT, SUBSEQUENT ENCOUNTER: Primary | ICD-10-CM

## 2024-01-31 NOTE — PROGRESS NOTES
Physical Therapy Daily Treatment Note  [address], [address]  [place] [address]      Patient: Colette Whitlock   : 1965  Referring practitioner: Makenzie Barnett MD  Date of Initial Visit: Type: THERAPY  Noted: 2024  Today's Date: 2024  Patient seen for 4 sessions       Visit Diagnoses:    ICD-10-CM ICD-9-CM   1. Traumatic tear of right rotator cuff, unspecified tear extent, subsequent encounter  S46.011D V58.89     840.4           Subjective   Patient reports that the pain is decreasing.    Objective          Passive Range of Motion     Right Shoulder   Flexion: Right shoulder passive forward flexion: about 155.   Abduction: Right shoulder passive abduction: WNL.   External rotation 45°: Right shoulder passive external rotation at 45 degrees: WNL.       See Exercise, Manual, and Modality Logs for complete treatment.       Assessment/Plan  Subjective reports are improving with regard to pain.  AROM is much improved from the first visit; patient is now able to raise the arm overhead with some pain.  Added PROM for the right shoulder.  Added supine shoulder flexion AAROM, supine shoulder flexion and prayer position to overhead.  Continued with the KT on the right shoulder to help with the S/S's.  Patient tolerated the exercise routine very well, no reports of pain with the routine.      Timed:         Manual Therapy:    8     mins  35519;     Therapeutic Exercise:    17     mins  13147;     Neuromuscular Doreen:    8    mins  27820;    Therapeutic Activity:     8     mins  51058;     Gait Training      0    mins  22143;  Work Conditioning     0   mins  21080       Untimed:  Electrical Stimulation:    0     mins  46999 ( );      Timed Treatment:   41   mins   Total Treatment:     41   mins    Mich Cruz, PT  [place] License: [id number]

## 2024-02-05 ENCOUNTER — TREATMENT (OUTPATIENT)
Dept: PHYSICAL THERAPY | Facility: CLINIC | Age: 59
End: 2024-02-05
Payer: COMMERCIAL

## 2024-02-05 DIAGNOSIS — S46.011D TRAUMATIC TEAR OF RIGHT ROTATOR CUFF, UNSPECIFIED TEAR EXTENT, SUBSEQUENT ENCOUNTER: Primary | ICD-10-CM

## 2024-02-05 PROCEDURE — 97530 THERAPEUTIC ACTIVITIES: CPT | Performed by: PHYSICAL THERAPIST

## 2024-02-05 PROCEDURE — 97140 MANUAL THERAPY 1/> REGIONS: CPT | Performed by: PHYSICAL THERAPIST

## 2024-02-05 PROCEDURE — 97110 THERAPEUTIC EXERCISES: CPT | Performed by: PHYSICAL THERAPIST

## 2024-02-05 NOTE — PROGRESS NOTES
Physical Therapy Daily Treatment Note  [address], [address]  [place] [address]      Patient: Colette Whitlock   : 1965  Referring practitioner: Makenzie Barnett MD  Date of Initial Visit: Type: THERAPY  Noted: 2024  Today's Date: 2024  Patient seen for 5 sessions       Visit Diagnoses:    ICD-10-CM ICD-9-CM   1. Traumatic tear of right rotator cuff, unspecified tear extent, subsequent encounter  S46.011D V58.89     840.4           Subjective   Patient reports that the shoulder feels a little better.  Continues to have difficulty raising the arm OH.    Objective   See Exercise, Manual, and Modality Logs for complete treatment.       Assessment/Plan  Subjective reports continue to gradually improve.  PROM is WNL for right shoulder flexion, abduction and ER, but has some tightness with IR.  Patient is able to raise the right arm overhead, but it requires effort.  Added ceiling punch to the routine.  Patient performed the routine without c/o pain in the right shoulder.  After the routine, patient is able to raise the right without as much effort as at the beginning of the treatment.      Timed:         Manual Therapy:    8     mins  14141;     Therapeutic Exercise:    25     mins  48653;     Neuromuscular Doreen:    0    mins  51043;    Therapeutic Activity:     8     mins  83907;     Gait Training      0    mins  13556;  Work Conditioning     0   mins  48220       Untimed:  Electrical Stimulation:    0     mins  69474 ( );      Timed Treatment:   41   mins   Total Treatment:     41   mins    Mich Cruz, PT  [place] License: [id number]

## 2024-02-07 ENCOUNTER — TREATMENT (OUTPATIENT)
Dept: PHYSICAL THERAPY | Facility: CLINIC | Age: 59
End: 2024-02-07
Payer: COMMERCIAL

## 2024-02-07 DIAGNOSIS — S46.011D TRAUMATIC TEAR OF RIGHT ROTATOR CUFF, UNSPECIFIED TEAR EXTENT, SUBSEQUENT ENCOUNTER: Primary | ICD-10-CM

## 2024-02-07 NOTE — PROGRESS NOTES
Physical Therapy Daily Treatment Note  [address], [address]  [place] [address]      Patient: Colette Whitlock   : 1965  Referring practitioner: Makenzie Barnett MD  Date of Initial Visit: Type: THERAPY  Noted: 2024  Today's Date: 2024  Patient seen for 6 sessions       Visit Diagnoses:    ICD-10-CM ICD-9-CM   1. Traumatic tear of right rotator cuff, unspecified tear extent, subsequent encounter  S46.011D V58.89     840.4           Subjective   Patient reports that the pain is better, but continues to have difficulty raising the arm.    Objective          Active Range of Motion     Right Shoulder   Flexion: Right shoulder active forward flexion: about 90. with pain  Abduction: Right shoulder active abduction: about 85. with pain  External rotation 0°: Right shoulder active external rotation at 0 degrees: WNL. with pain    Passive Range of Motion     Right Shoulder   Flexion: Right shoulder passive forward flexion: WNL.   Abduction: Right shoulder passive abduction: WNL.     Strength/Myotome Testing     Right Shoulder     Planes of Motion   External rotation at 0°: 4   Internal rotation at 0°: 4+     Isolated Muscles   Supraspinatus: 4-       See Exercise, Manual, and Modality Logs for complete treatment.       Assessment/Plan  Subjective reports are improved with regard to pain.  PROM is WNL for the right shoulder, but AROM and strength continue to be significantly limited.  Compensatory movement patterns persist with shoulder elevation.  Feel that further medical management may be needed at this time.  Patient performed the routine without visual or verbal signs of pain the knee.      Timed:         Manual Therapy:    0     mins  97885;     Therapeutic Exercise:    18     mins  22118;     Neuromuscular Doreen:    8    mins  19361;    Therapeutic Activity:     8     mins  94747;     Gait Training      0    mins  15032;  Work Conditioning     0   mins  76980       Untimed:  Electrical  Stimulation:    0     mins  46804 ( );      Timed Treatment:   34   mins   Total Treatment:     34   mins    Mich Cruz, PT  [place] License: [id number]

## 2024-02-12 ENCOUNTER — OFFICE VISIT (OUTPATIENT)
Dept: ORTHOPEDIC SURGERY | Facility: CLINIC | Age: 59
End: 2024-02-12
Payer: COMMERCIAL

## 2024-02-12 VITALS — HEIGHT: 60 IN | WEIGHT: 256.8 LBS | TEMPERATURE: 97.6 F | BODY MASS INDEX: 50.42 KG/M2

## 2024-02-12 DIAGNOSIS — M75.121 COMPLETE TEAR OF RIGHT ROTATOR CUFF, UNSPECIFIED WHETHER TRAUMATIC: Primary | ICD-10-CM

## 2024-02-12 PROCEDURE — 99213 OFFICE O/P EST LOW 20 MIN: CPT | Performed by: ORTHOPAEDIC SURGERY

## 2024-02-14 ENCOUNTER — DOCUMENTATION (OUTPATIENT)
Dept: PHYSICAL THERAPY | Facility: CLINIC | Age: 59
End: 2024-02-14
Payer: COMMERCIAL

## 2024-02-27 ENCOUNTER — HOSPITAL ENCOUNTER (OUTPATIENT)
Dept: MRI IMAGING | Facility: HOSPITAL | Age: 59
Discharge: HOME | End: 2024-02-27
Admitting: ORTHOPAEDIC SURGERY
Payer: COMMERCIAL

## 2024-02-27 DIAGNOSIS — M75.121 COMPLETE TEAR OF RIGHT ROTATOR CUFF, UNSPECIFIED WHETHER TRAUMATIC: ICD-10-CM

## 2024-02-27 PROCEDURE — 73221 MRI JOINT UPR EXTREM W/O DYE: CPT

## 2024-02-28 ENCOUNTER — TRANSCRIBE ORDERS (OUTPATIENT)
Dept: ADMINISTRATIVE | Facility: HOSPITAL | Age: 59
End: 2024-02-28
Payer: COMMERCIAL

## 2024-02-28 DIAGNOSIS — Z12.31 ENCOUNTER FOR SCREENING MAMMOGRAM FOR BREAST CANCER: Primary | ICD-10-CM

## 2024-03-01 ENCOUNTER — TELEPHONE (OUTPATIENT)
Dept: ORTHOPEDIC SURGERY | Facility: CLINIC | Age: 59
End: 2024-03-01
Payer: COMMERCIAL

## 2024-03-01 NOTE — TELEPHONE ENCOUNTER
----- Message from Makenzie Barnett MD sent at 3/1/2024  7:39 AM EST -----  Please tell her that she does have a large tear of the rotator cuff I would recommend fixing this I would like for her to make a follow-up MRI appointment and discuss further

## 2024-03-01 NOTE — TELEPHONE ENCOUNTER
Called pt and gave results/message per HEIDI.   HEIDI Ok'd pt being scheduled Monday, 3/4 at 1:30PM due to her disability period expiring.

## 2024-03-04 ENCOUNTER — OFFICE VISIT (OUTPATIENT)
Dept: ORTHOPEDIC SURGERY | Facility: CLINIC | Age: 59
End: 2024-03-04
Payer: COMMERCIAL

## 2024-03-04 VITALS — TEMPERATURE: 97.9 F | BODY MASS INDEX: 46.89 KG/M2 | WEIGHT: 254.8 LBS | HEIGHT: 62 IN

## 2024-03-04 DIAGNOSIS — S46.011D TRAUMATIC COMPLETE TEAR OF RIGHT ROTATOR CUFF, SUBSEQUENT ENCOUNTER: Primary | ICD-10-CM

## 2024-03-04 NOTE — PROGRESS NOTES
"Shoulder MRI Follow Up      Patient: Colette Whitlock        YOB: 1965            Chief Complaints: Shoulder pain right      History of Present Illness: The patient is here follow-up of an MRI of the shoulder on the right she is here with her daughter who helps interpret although her English is pretty good she is here in follow-up MRI which demonstrates a large rotator cuff tear approximately 2 cm there is a little atrophy in the infraspinatus she is miserable wishes to proceed with repair      Physical Exam: 59 y.o. female  General Appearance:    Alert, cooperative, in no acute distress                   Vitals:    03/04/24 1342   Temp: 97.9 °F (36.6 °C)   Weight: 116 kg (254 lb 12.8 oz)   Height: 157.5 cm (62\")   PainSc: 10-Worst pain ever        Patient is alert and read ×3 no acute distress appears her above-listed at height weight and age.  Affect is normal respiratory rate is normal unlabored. Heart rate regular rate rhythm, sclera, dentition and hearing are normal for the purpose of this exam.      Ortho Exam  Exam of the right shoulder active flexion is only to 130 passively get her to about 160 but I think pain is what stopping is abduction similar external rotation is to 45 with 4+/5 isometric strength testing  .[email or web address]  Physical Exam: 59 y.o. female  General Appearance:    Alert, cooperative, in no acute distress                      Vitals:    03/04/24 1342   Temp: 97.9 °F (36.6 °C)   Weight: 116 kg (254 lb 12.8 oz)   Height: 157.5 cm (62\")   PainSc: 10-Worst pain ever        Head:    Normocephalic, without obvious abnormality, atraumatic   Eyes:            conjunctivae and sclerae normal, no pallor, corneas clear,    Ears:    Ears appear intact with no abnormalities noted   Throat:   No oral lesions, no thrush, oral mucosa moist   Neck:   No adenopathy, supple, trachea midline, no thyromegaly,    Back:     No kyphosis present, no scoliosis present, no skin lesions,      erythema " or scars, no tenderness to percussion or                   palpation,   range of motion normal   Lungs:     ,respirations regular, even and                  unlabored    Heart:    Regular rhythm and normal rate               Chest Wall:    No abnormalities observed               Pulses:   Pulses palpable and equal bilaterally   Skin:   No bleeding, bruising or rash   Lymph nodes:   No palpable adenopathy   Neurologic:   Appears neurologic intact       MRI Results: MRIs as above have reviewed and agree  Procedures      Assessment/Plan: Right shoulder rotator cuff tear had a long discussion with she and her daughter regarding the size of the tear options including repair risk benefits and alternatives  The patient voiced understanding of the risks, benefits, and alternative forms of treatment that were discussed and the patient consents to proceed with the above listed surgery.  All risks, benefits and alternatives were discussed.  Risks including to but not exclusive to anesthetic complications, including death, MI, CVA, infection, bleeding DVT, fracture, residual pain and need for future surgery.  We talked about the potential for not healing also talked about the potential having capsulitis I would examine her under anesthesia initially if I proceeded with a manipulation but hold off on the repair I do not think this is likely but it is a possibility

## 2024-03-05 PROBLEM — S46.011A TRAUMATIC COMPLETE TEAR OF RIGHT ROTATOR CUFF: Status: ACTIVE | Noted: 2024-03-04

## 2024-03-07 ENCOUNTER — PRE-ADMISSION TESTING (OUTPATIENT)
Dept: PREADMISSION TESTING | Facility: HOSPITAL | Age: 59
End: 2024-03-07
Payer: COMMERCIAL

## 2024-03-07 VITALS
BODY MASS INDEX: 47.11 KG/M2 | TEMPERATURE: 98.1 F | HEART RATE: 76 BPM | SYSTOLIC BLOOD PRESSURE: 139 MMHG | RESPIRATION RATE: 16 BRPM | HEIGHT: 62 IN | DIASTOLIC BLOOD PRESSURE: 82 MMHG | OXYGEN SATURATION: 99 % | WEIGHT: 256 LBS

## 2024-03-07 LAB
ANION GAP SERPL CALCULATED.3IONS-SCNC: 8 MMOL/L (ref 5–15)
BUN SERPL-MCNC: 17 MG/DL (ref 6–20)
BUN/CREAT SERPL: 21.8 (ref 7–25)
CALCIUM SPEC-SCNC: 9.9 MG/DL (ref 8.6–10.5)
CHLORIDE SERPL-SCNC: 104 MMOL/L (ref 98–107)
CO2 SERPL-SCNC: 28 MMOL/L (ref 22–29)
CREAT SERPL-MCNC: 0.78 MG/DL (ref 0.57–1)
DEPRECATED RDW RBC AUTO: 43.4 FL (ref 37–54)
EGFRCR SERPLBLD CKD-EPI 2021: 87.6 ML/MIN/1.73
ERYTHROCYTE [DISTWIDTH] IN BLOOD BY AUTOMATED COUNT: 14.5 % (ref 12.3–15.4)
GLUCOSE SERPL-MCNC: 104 MG/DL (ref 65–99)
HCT VFR BLD AUTO: 42.4 % (ref 34–46.6)
HGB BLD-MCNC: 13.3 G/DL (ref 12–15.9)
MCH RBC QN AUTO: 25.8 PG (ref 26.6–33)
MCHC RBC AUTO-ENTMCNC: 31.4 G/DL (ref 31.5–35.7)
MCV RBC AUTO: 82.3 FL (ref 79–97)
PLATELET # BLD AUTO: 223 10*3/MM3 (ref 140–450)
PMV BLD AUTO: 11.4 FL (ref 6–12)
POTASSIUM SERPL-SCNC: 4 MMOL/L (ref 3.5–5.2)
RBC # BLD AUTO: 5.15 10*6/MM3 (ref 3.77–5.28)
SODIUM SERPL-SCNC: 140 MMOL/L (ref 136–145)
WBC NRBC COR # BLD AUTO: 6.47 10*3/MM3 (ref 3.4–10.8)

## 2024-03-07 PROCEDURE — 85027 COMPLETE CBC AUTOMATED: CPT

## 2024-03-07 PROCEDURE — 80048 BASIC METABOLIC PNL TOTAL CA: CPT

## 2024-03-07 PROCEDURE — 36415 COLL VENOUS BLD VENIPUNCTURE: CPT

## 2024-03-07 RX ORDER — ACETAMINOPHEN 500 MG
500 TABLET ORAL EVERY 6 HOURS PRN
COMMUNITY

## 2024-03-07 NOTE — DISCHARGE INSTRUCTIONS
Arrive to hospital on your day of surgery at (WILL CALL WITH ARRIVAL TIME)    Take the following medications the morning of surgery:  NONE      If you are on prescription narcotic pain medication to control your pain you may also take that medication the morning of surgery.    General Instructions:  Do not eat solid food after midnight the night before surgery.  You may drink clear liquids day of surgery but must stop at least one hour before your hospital arrival time.  It is beneficial for you to have a clear drink that contains carbohydrates the day of surgery.  We suggest a 12 to 20 ounce bottle of Gatorade or Powerade for non-diabetic patients or a 12 to 20 ounce bottle of G2 or Powerade Zero for diabetic patients. (Pediatric patients, are not advised to drink a 12 to 20 ounce carbohydrate drink)    Clear liquids are liquids you can see through.  Nothing red in color.     Plain water                               Sports drinks  Sodas                                   Gelatin (Jell-O)  Fruit juices without pulp such as white grape juice and apple juice  Popsicles that contain no fruit or yogurt  Tea or coffee (no cream or milk added)  Gatorade / Powerade  G2 / Powerade Zero    Infants may have breast milk up to four hours before surgery.  Infants drinking formula may drink formula up to six hours before surgery.   Patients who avoid smoking, chewing tobacco and alcohol for 4 weeks prior to surgery have a reduced risk of post-operative complications.  Quit smoking as many days before surgery as you can.  Do not smoke, use chewing tobacco or drink alcohol the day of surgery.   If applicable bring your C-PAP/ BI-PAP machine in with you to preop day of surgery.  Bring any papers given to you in the doctor’s office.  Wear clean comfortable clothes.  Do not wear contact lenses, false eyelashes or make-up.  Bring a case for your glasses.   Bring crutches or walker if applicable.  Remove all piercings.  Leave jewelry  and any other valuables at home.  Hair extensions with metal clips must be removed prior to surgery.  The Pre-Admission Testing nurse will instruct you to bring medications if unable to obtain an accurate list in Pre-Admission Testing.        If you were given a blood bank ID arm band remember to bring it with you the day of surgery.    Preventing a Surgical Site Infection:  For 2 to 3 days before surgery, avoid shaving with a razor because the razor can irritate skin and make it easier to develop an infection.    Any areas of open skin can increase the risk of a post-operative wound infection by allowing bacteria to enter and travel throughout the body.  Notify your surgeon if you have any skin wounds / rashes even if it is not near the expected surgical site.  The area will need assessed to determine if surgery should be delayed until it is healed.  The night prior to surgery shower using a fresh bar of anti-bacterial soap (such as Dial) and clean washcloth.  Sleep in a clean bed with clean clothing.  Do not allow pets to sleep with you.  Shower on the morning of surgery using a fresh bar of anti-bacterial soap (such as Dial) and clean washcloth.  Dry with a clean towel and dress in clean clothing.  Ask your surgeon if you will be receiving antibiotics prior to surgery.  Make sure you, your family, and all healthcare providers clean their hands with soap and water or an alcohol based hand  before caring for you or your wound.    Day of surgery:  Your arrival time is approximately two hours before your scheduled surgery time.  Upon arrival, a Pre-op nurse and Anesthesiologist will review your health history, obtain vital signs, and answer questions you may have.  The only belongings needed at this time will be a list of your home medications and if applicable your C-PAP/BI-PAP machine.  A Pre-op nurse will start an IV and you may receive medication in preparation for surgery, including something to help  you relax.     Please be aware that surgery does come with discomfort.  We want to make every effort to control your discomfort so please discuss any uncontrolled symptoms with your nurse.   Your doctor will most likely have prescribed pain medications.      If you are going home after surgery you will receive individualized written care instructions before being discharged.  A responsible adult must drive you to and from the hospital on the day of your surgery and ideally stay with you through the night.   .  Discharge prescriptions can be filled by the hospital pharmacy during regular pharmacy hours.  If you are having surgery late in the day/evening your prescription may be e-prescribed to your pharmacy.  Please verify your pharmacy hours or chose a 24 hour pharmacy to avoid not having access to your prescription because your pharmacy has closed for the day.    If you are staying overnight following surgery, you will be transported to your hospital room following the recovery period.  [facility] has all private rooms.    If you have any questions please call Pre-Admission Testing at [phone].  Deductibles and co-payments are collected on the day of service. Please be prepared to pay the required co-pay, deductible or deposit on the day of service as defined by your plan.    Call your surgeon immediately if you experience any of the following symptoms:  Sore Throat  Shortness of Breath or difficulty breathing  Cough  Chills  Body soreness or muscle pain  Headache  Fever  New loss of taste or smell  Do not arrive for your surgery ill.  Your procedure will need to be rescheduled to another time.  You will need to call your physician before the day of surgery to avoid any unnecessary exposure to hospital staff as well as other patients.

## 2024-03-13 ENCOUNTER — ANESTHESIA (OUTPATIENT)
Dept: PERIOP | Facility: HOSPITAL | Age: 59
End: 2024-03-13
Payer: COMMERCIAL

## 2024-03-13 ENCOUNTER — ANESTHESIA EVENT (OUTPATIENT)
Dept: PERIOP | Facility: HOSPITAL | Age: 59
End: 2024-03-13
Payer: COMMERCIAL

## 2024-03-13 ENCOUNTER — HOSPITAL ENCOUNTER (OUTPATIENT)
Facility: HOSPITAL | Age: 59
Setting detail: HOSPITAL OUTPATIENT SURGERY
Discharge: NURSING, REHAB OR LONG-TERM CARE | End: 2024-03-13
Attending: ORTHOPAEDIC SURGERY | Admitting: ORTHOPAEDIC SURGERY
Payer: COMMERCIAL

## 2024-03-13 VITALS
RESPIRATION RATE: 16 BRPM | TEMPERATURE: 97.3 F | OXYGEN SATURATION: 95 % | SYSTOLIC BLOOD PRESSURE: 138 MMHG | HEART RATE: 81 BPM | DIASTOLIC BLOOD PRESSURE: 93 MMHG

## 2024-03-13 DIAGNOSIS — S46.011D TRAUMATIC COMPLETE TEAR OF RIGHT ROTATOR CUFF, SUBSEQUENT ENCOUNTER: ICD-10-CM

## 2024-03-13 PROCEDURE — 25010000002 CEFAZOLIN PER 500 MG: Performed by: ORTHOPAEDIC SURGERY

## 2024-03-13 PROCEDURE — 25010000002 FENTANYL CITRATE (PF) 50 MCG/ML SOLUTION: Performed by: NURSE ANESTHETIST, CERTIFIED REGISTERED

## 2024-03-13 PROCEDURE — 25010000002 PROPOFOL 200 MG/20ML EMULSION: Performed by: NURSE ANESTHETIST, CERTIFIED REGISTERED

## 2024-03-13 PROCEDURE — 25010000002 SUGAMMADEX 200 MG/2ML SOLUTION: Performed by: NURSE ANESTHETIST, CERTIFIED REGISTERED

## 2024-03-13 PROCEDURE — 23412 REPAIR ROTATOR CUFF CHRONIC: CPT | Performed by: TECHNICIAN, OTHER

## 2024-03-13 PROCEDURE — 0 BUPIVACAINE LIPOSOME 1.3 % SUSPENSION: Performed by: ANESTHESIOLOGY

## 2024-03-13 PROCEDURE — 29822 SHO ARTHRS SRG LMTD DBRDMT: CPT | Performed by: ORTHOPAEDIC SURGERY

## 2024-03-13 PROCEDURE — 25810000003 LACTATED RINGERS PER 1000 ML: Performed by: ORTHOPAEDIC SURGERY

## 2024-03-13 PROCEDURE — C9290 INJ, BUPIVACAINE LIPOSOME: HCPCS | Performed by: ANESTHESIOLOGY

## 2024-03-13 PROCEDURE — 23412 REPAIR ROTATOR CUFF CHRONIC: CPT | Performed by: ORTHOPAEDIC SURGERY

## 2024-03-13 PROCEDURE — C1713 ANCHOR/SCREW BN/BN,TIS/BN: HCPCS | Performed by: ORTHOPAEDIC SURGERY

## 2024-03-13 PROCEDURE — 25010000002 EPINEPHRINE PER 0.1 MG: Performed by: ORTHOPAEDIC SURGERY

## 2024-03-13 PROCEDURE — 25010000002 MIDAZOLAM PER 1 MG: Performed by: ANESTHESIOLOGY

## 2024-03-13 PROCEDURE — 25010000002 LABETALOL 5 MG/ML SOLUTION: Performed by: NURSE ANESTHETIST, CERTIFIED REGISTERED

## 2024-03-13 PROCEDURE — 25010000002 ONDANSETRON PER 1 MG: Performed by: NURSE ANESTHETIST, CERTIFIED REGISTERED

## 2024-03-13 PROCEDURE — 29822 SHO ARTHRS SRG LMTD DBRDMT: CPT | Performed by: TECHNICIAN, OTHER

## 2024-03-13 PROCEDURE — 25010000002 DEXAMETHASONE SODIUM PHOSPHATE 20 MG/5ML SOLUTION: Performed by: NURSE ANESTHETIST, CERTIFIED REGISTERED

## 2024-03-13 PROCEDURE — 25010000002 BUPIVACAINE (PF) 0.5 % SOLUTION: Performed by: ANESTHESIOLOGY

## 2024-03-13 PROCEDURE — 25010000002 BUPIVACAINE (PF) 0.25 % SOLUTION: Performed by: ANESTHESIOLOGY

## 2024-03-13 PROCEDURE — 25010000002 FENTANYL CITRATE (PF) 50 MCG/ML SOLUTION: Performed by: ANESTHESIOLOGY

## 2024-03-13 PROCEDURE — 25810000003 LACTATED RINGERS PER 1000 ML: Performed by: ANESTHESIOLOGY

## 2024-03-13 DEVICE — SUT/ANCH SHLDR SUTURETAPE X/PTRN 1.3MM WHT/BLK WHT/BLU: Type: IMPLANTABLE DEVICE | Site: SHOULDER | Status: FUNCTIONAL

## 2024-03-13 DEVICE — SYS SUT/ANCH ROT/CUFF SPEEDBRIDGE 2.6FIBERTAK 4.75SWIVELOCK: Type: IMPLANTABLE DEVICE | Site: SHOULDER | Status: FUNCTIONAL

## 2024-03-13 RX ORDER — NALOXONE HCL 0.4 MG/ML
0.2 VIAL (ML) INJECTION AS NEEDED
Status: DISCONTINUED | OUTPATIENT
Start: 2024-03-13 | End: 2024-03-13 | Stop reason: HOSPADM

## 2024-03-13 RX ORDER — OXYCODONE AND ACETAMINOPHEN 7.5; 325 MG/1; MG/1
1 TABLET ORAL EVERY 4 HOURS PRN
Status: DISCONTINUED | OUTPATIENT
Start: 2024-03-13 | End: 2024-03-13 | Stop reason: HOSPADM

## 2024-03-13 RX ORDER — ACETAMINOPHEN 500 MG
1000 TABLET ORAL ONCE
Status: COMPLETED | OUTPATIENT
Start: 2024-03-13 | End: 2024-03-13

## 2024-03-13 RX ORDER — DEXAMETHASONE SODIUM PHOSPHATE 4 MG/ML
INJECTION, SOLUTION INTRA-ARTICULAR; INTRALESIONAL; INTRAMUSCULAR; INTRAVENOUS; SOFT TISSUE AS NEEDED
Status: DISCONTINUED | OUTPATIENT
Start: 2024-03-13 | End: 2024-03-13 | Stop reason: SURG

## 2024-03-13 RX ORDER — PROPOFOL 10 MG/ML
INJECTION, EMULSION INTRAVENOUS AS NEEDED
Status: DISCONTINUED | OUTPATIENT
Start: 2024-03-13 | End: 2024-03-13 | Stop reason: SURG

## 2024-03-13 RX ORDER — ROCURONIUM BROMIDE 10 MG/ML
INJECTION, SOLUTION INTRAVENOUS AS NEEDED
Status: DISCONTINUED | OUTPATIENT
Start: 2024-03-13 | End: 2024-03-13 | Stop reason: SURG

## 2024-03-13 RX ORDER — MIDAZOLAM HYDROCHLORIDE 1 MG/ML
1 INJECTION INTRAMUSCULAR; INTRAVENOUS
Status: DISCONTINUED | OUTPATIENT
Start: 2024-03-13 | End: 2024-03-13 | Stop reason: HOSPADM

## 2024-03-13 RX ORDER — ONDANSETRON 2 MG/ML
4 INJECTION INTRAMUSCULAR; INTRAVENOUS ONCE AS NEEDED
Status: DISCONTINUED | OUTPATIENT
Start: 2024-03-13 | End: 2024-03-13 | Stop reason: HOSPADM

## 2024-03-13 RX ORDER — OXYCODONE HYDROCHLORIDE AND ACETAMINOPHEN 5; 325 MG/1; MG/1
1-2 TABLET ORAL EVERY 4 HOURS PRN
Qty: 45 TABLET | Refills: 0 | Status: SHIPPED | OUTPATIENT
Start: 2024-03-13

## 2024-03-13 RX ORDER — LIDOCAINE HYDROCHLORIDE 20 MG/ML
INJECTION, SOLUTION EPIDURAL; INFILTRATION; INTRACAUDAL; PERINEURAL AS NEEDED
Status: DISCONTINUED | OUTPATIENT
Start: 2024-03-13 | End: 2024-03-13 | Stop reason: SURG

## 2024-03-13 RX ORDER — SODIUM CHLORIDE 0.9 % (FLUSH) 0.9 %
3-10 SYRINGE (ML) INJECTION AS NEEDED
Status: DISCONTINUED | OUTPATIENT
Start: 2024-03-13 | End: 2024-03-13 | Stop reason: HOSPADM

## 2024-03-13 RX ORDER — PROMETHAZINE HYDROCHLORIDE 25 MG/1
25 TABLET ORAL ONCE AS NEEDED
Status: DISCONTINUED | OUTPATIENT
Start: 2024-03-13 | End: 2024-03-13 | Stop reason: HOSPADM

## 2024-03-13 RX ORDER — HYDRALAZINE HYDROCHLORIDE 20 MG/ML
5 INJECTION INTRAMUSCULAR; INTRAVENOUS
Status: DISCONTINUED | OUTPATIENT
Start: 2024-03-13 | End: 2024-03-13 | Stop reason: HOSPADM

## 2024-03-13 RX ORDER — AMOXICILLIN 500 MG/1
1000 CAPSULE ORAL 2 TIMES DAILY
COMMUNITY

## 2024-03-13 RX ORDER — PROMETHAZINE HYDROCHLORIDE 25 MG/1
25 SUPPOSITORY RECTAL ONCE AS NEEDED
Status: DISCONTINUED | OUTPATIENT
Start: 2024-03-13 | End: 2024-03-13 | Stop reason: HOSPADM

## 2024-03-13 RX ORDER — EPHEDRINE SULFATE 50 MG/ML
5 INJECTION, SOLUTION INTRAVENOUS ONCE AS NEEDED
Status: DISCONTINUED | OUTPATIENT
Start: 2024-03-13 | End: 2024-03-13 | Stop reason: HOSPADM

## 2024-03-13 RX ORDER — FAMOTIDINE 10 MG/ML
20 INJECTION, SOLUTION INTRAVENOUS ONCE
Status: COMPLETED | OUTPATIENT
Start: 2024-03-13 | End: 2024-03-13

## 2024-03-13 RX ORDER — ONDANSETRON 4 MG/1
4 TABLET, FILM COATED ORAL EVERY 8 HOURS PRN
Qty: 12 TABLET | Refills: 0 | Status: SHIPPED | OUTPATIENT
Start: 2024-03-13

## 2024-03-13 RX ORDER — ONDANSETRON 2 MG/ML
INJECTION INTRAMUSCULAR; INTRAVENOUS AS NEEDED
Status: DISCONTINUED | OUTPATIENT
Start: 2024-03-13 | End: 2024-03-13 | Stop reason: SURG

## 2024-03-13 RX ORDER — SODIUM CHLORIDE 0.9 % (FLUSH) 0.9 %
3 SYRINGE (ML) INJECTION EVERY 12 HOURS SCHEDULED
Status: DISCONTINUED | OUTPATIENT
Start: 2024-03-13 | End: 2024-03-13 | Stop reason: HOSPADM

## 2024-03-13 RX ORDER — BUPIVACAINE HYDROCHLORIDE 2.5 MG/ML
INJECTION, SOLUTION EPIDURAL; INFILTRATION; INTRACAUDAL
Status: COMPLETED | OUTPATIENT
Start: 2024-03-13 | End: 2024-03-13

## 2024-03-13 RX ORDER — DROPERIDOL 2.5 MG/ML
0.62 INJECTION, SOLUTION INTRAMUSCULAR; INTRAVENOUS
Status: DISCONTINUED | OUTPATIENT
Start: 2024-03-13 | End: 2024-03-13 | Stop reason: HOSPADM

## 2024-03-13 RX ORDER — SODIUM CHLORIDE, SODIUM LACTATE, POTASSIUM CHLORIDE, CALCIUM CHLORIDE 600; 310; 30; 20 MG/100ML; MG/100ML; MG/100ML; MG/100ML
9 INJECTION, SOLUTION INTRAVENOUS CONTINUOUS
Status: DISCONTINUED | OUTPATIENT
Start: 2024-03-13 | End: 2024-03-13 | Stop reason: HOSPADM

## 2024-03-13 RX ORDER — FENTANYL CITRATE 50 UG/ML
50 INJECTION, SOLUTION INTRAMUSCULAR; INTRAVENOUS
Status: DISCONTINUED | OUTPATIENT
Start: 2024-03-13 | End: 2024-03-13 | Stop reason: HOSPADM

## 2024-03-13 RX ORDER — HYDROMORPHONE HYDROCHLORIDE 1 MG/ML
0.5 INJECTION, SOLUTION INTRAMUSCULAR; INTRAVENOUS; SUBCUTANEOUS
Status: DISCONTINUED | OUTPATIENT
Start: 2024-03-13 | End: 2024-03-13 | Stop reason: HOSPADM

## 2024-03-13 RX ORDER — LABETALOL HYDROCHLORIDE 5 MG/ML
5 INJECTION, SOLUTION INTRAVENOUS
Status: DISCONTINUED | OUTPATIENT
Start: 2024-03-13 | End: 2024-03-13 | Stop reason: HOSPADM

## 2024-03-13 RX ORDER — BUPIVACAINE HYDROCHLORIDE 5 MG/ML
INJECTION, SOLUTION EPIDURAL; INTRACAUDAL
Status: COMPLETED | OUTPATIENT
Start: 2024-03-13 | End: 2024-03-13

## 2024-03-13 RX ORDER — FLUMAZENIL 0.1 MG/ML
0.2 INJECTION INTRAVENOUS AS NEEDED
Status: DISCONTINUED | OUTPATIENT
Start: 2024-03-13 | End: 2024-03-13 | Stop reason: HOSPADM

## 2024-03-13 RX ORDER — IPRATROPIUM BROMIDE AND ALBUTEROL SULFATE 2.5; .5 MG/3ML; MG/3ML
3 SOLUTION RESPIRATORY (INHALATION) ONCE AS NEEDED
Status: DISCONTINUED | OUTPATIENT
Start: 2024-03-13 | End: 2024-03-13 | Stop reason: HOSPADM

## 2024-03-13 RX ORDER — DIPHENHYDRAMINE HYDROCHLORIDE 50 MG/ML
12.5 INJECTION INTRAMUSCULAR; INTRAVENOUS
Status: DISCONTINUED | OUTPATIENT
Start: 2024-03-13 | End: 2024-03-13 | Stop reason: HOSPADM

## 2024-03-13 RX ORDER — WOUND DRESSING ADHESIVE - LIQUID
LIQUID MISCELLANEOUS AS NEEDED
Status: DISCONTINUED | OUTPATIENT
Start: 2024-03-13 | End: 2024-03-13 | Stop reason: HOSPADM

## 2024-03-13 RX ORDER — HYDROCODONE BITARTRATE AND ACETAMINOPHEN 5; 325 MG/1; MG/1
1 TABLET ORAL ONCE AS NEEDED
Status: COMPLETED | OUTPATIENT
Start: 2024-03-13 | End: 2024-03-13

## 2024-03-13 RX ADMIN — PROPOFOL 150 MG: 10 INJECTION, EMULSION INTRAVENOUS at 07:03

## 2024-03-13 RX ADMIN — HYDROCODONE BITARTRATE AND ACETAMINOPHEN 1 TABLET: 5; 325 TABLET ORAL at 08:52

## 2024-03-13 RX ADMIN — SUGAMMADEX 200 MG: 100 INJECTION, SOLUTION INTRAVENOUS at 08:23

## 2024-03-13 RX ADMIN — SODIUM CHLORIDE, POTASSIUM CHLORIDE, SODIUM LACTATE AND CALCIUM CHLORIDE 9 ML/HR: 600; 310; 30; 20 INJECTION, SOLUTION INTRAVENOUS at 06:11

## 2024-03-13 RX ADMIN — ONDANSETRON 4 MG: 2 INJECTION INTRAMUSCULAR; INTRAVENOUS at 08:18

## 2024-03-13 RX ADMIN — BUPIVACAINE HYDROCHLORIDE 10 ML: 2.5 INJECTION, SOLUTION EPIDURAL; INFILTRATION; INTRACAUDAL; PERINEURAL at 06:25

## 2024-03-13 RX ADMIN — MIDAZOLAM 1 MG: 1 INJECTION INTRAMUSCULAR; INTRAVENOUS at 06:22

## 2024-03-13 RX ADMIN — ACETAMINOPHEN 1000 MG: 500 TABLET ORAL at 06:19

## 2024-03-13 RX ADMIN — LIDOCAINE HYDROCHLORIDE 60 MG: 20 INJECTION, SOLUTION EPIDURAL; INFILTRATION; INTRACAUDAL; PERINEURAL at 07:03

## 2024-03-13 RX ADMIN — BUPIVACAINE HYDROCHLORIDE 10 ML: 5 INJECTION, SOLUTION EPIDURAL; INTRACAUDAL; PERINEURAL at 06:25

## 2024-03-13 RX ADMIN — FENTANYL CITRATE 50 MCG: 50 INJECTION, SOLUTION INTRAMUSCULAR; INTRAVENOUS at 06:27

## 2024-03-13 RX ADMIN — ROCURONIUM BROMIDE 50 MG: 10 INJECTION, SOLUTION INTRAVENOUS at 07:03

## 2024-03-13 RX ADMIN — FENTANYL CITRATE 50 MCG: 50 INJECTION, SOLUTION INTRAMUSCULAR; INTRAVENOUS at 09:04

## 2024-03-13 RX ADMIN — LABETALOL HYDROCHLORIDE 5 MG: 5 INJECTION, SOLUTION INTRAVENOUS at 08:43

## 2024-03-13 RX ADMIN — FAMOTIDINE 20 MG: 10 INJECTION INTRAVENOUS at 06:19

## 2024-03-13 RX ADMIN — BUPIVACAINE 10 ML: 13.3 INJECTION, SUSPENSION, LIPOSOMAL INFILTRATION at 06:25

## 2024-03-13 RX ADMIN — SODIUM CHLORIDE 2 G: 900 INJECTION INTRAVENOUS at 06:49

## 2024-03-13 RX ADMIN — DEXAMETHASONE SODIUM PHOSPHATE 8 MG: 4 INJECTION, SOLUTION INTRAMUSCULAR; INTRAVENOUS at 07:13

## 2024-03-13 NOTE — H&P
History & Physical       Patient: Colette Whitlock    Date of Admission: 3/13/2024  5:10 AM    YOB: 1965    Medical Record Number: [id number]    Attending Physician: Makenzie Barnett MD        Chief Complaints: Traumatic complete tear of right rotator cuff, subsequent encounter [S46.011D]      History of Present Illness: This patient 7-month history of right shoulder pain has been unresponsive conservative management she has an exam and MRI which shows a rotator cuff tear.  She has a fairly large tear she presents for arthroscopy and repair she understands in the event I find other pathology at time of surgery I would address it as deemed appropriate we will also address her motion prior to starting should she be significantly limited I would proceed with a manipulation and injection and delay the repair     Allergies:   Allergies   Allergen Reactions    Penicillin G Unknown (See Comments)     Told this as a child       Medications:   Home Medications:  No current facility-administered medications on file prior to encounter.     Current Outpatient Medications on File Prior to Encounter   Medication Sig    amoxicillin (AMOXIL) 500 MG capsule Take 2 capsules by mouth 2 (Two) Times a Day.    Diclofenac Sodium (Voltaren Arthritis Pain) 1 % gel gel Apply 4 g topically to the appropriate area as directed 2 (Two) Times a Day. (Patient taking differently: Apply 4 g topically to the appropriate area as directed Every 6 (Six) Hours As Needed. FOLLOW MD GUIDELINES ON HOLDING FOR DOS)    montelukast (SINGULAIR) 10 MG tablet Take 1 tablet by mouth every night at bedtime. (Patient taking differently: Take 1 tablet by mouth At Night As Needed.)    ketotifen (ZADITOR) 0.025 % ophthalmic solution Apply 1 drop to eye(s) as directed by provider Every 12 (Twelve) Hours As Needed.    psyllium (Metamucil Smooth Texture) 58.6 % powder 1 large tablespoon mixed in 12-15 ounces of water every morning. (Patient taking  differently: Take 1 packet by mouth As Needed. 1 large tablespoon mixed in 12-15 ounces of water every morning.)     Current Medications:  Scheduled Meds:ceFAZolin, 2 g, Intravenous, Once  sodium chloride, 3 mL, Intravenous, Q12H      Continuous Infusions:lactated ringers, 9 mL/hr, Last Rate: 9 mL/hr (24 0611)      PRN Meds:.  fentanyl    fentaNYL citrate (PF)    midazolam    midazolam    sodium chloride    Past Medical History:   Diagnosis Date    Arthritis     Frozen shoulder     Prediabetes     Right shoulder pain     Seasonal allergies     Torn rotator cuff     RIGHT        Past Surgical History:   Procedure Laterality Date    ANKLE OPEN REDUCTION INTERNAL FIXATION Right     X2     SECTION      X1    COLONOSCOPY          Social History     Occupational History    Not on file   Tobacco Use    Smoking status: Never    Smokeless tobacco: Never   Vaping Use    Vaping status: Never Used   Substance and Sexual Activity    Alcohol use: Not Currently    Drug use: Never    Sexual activity: Yes     Partners: Male     Comment: , three children      Social History     Social History Narrative    Not on file        Family History   Problem Relation Age of Onset    No Known Problems Mother     No Known Problems Father     Breast cancer Neg Hx     Colon cancer Neg Hx     Malig Hyperthermia Neg Hx        Review of Systems      Physical Exam: 59 y.o. female  General Appearance:    Alert, cooperative, in no acute distress                      Vitals:    24 0626 24 0627 24 0630 24 0631   BP: (!) 159/101  150/93 150/93   BP Location:   Left arm    Patient Position:   Lying    Pulse:  92 88    Resp:   19    Temp:       TempSrc:       SpO2:  98% 97%         Head:  Normocephalic, without obvious abnormality, atraumatic   Eyes:          Conjunctivae and sclerae normal, no pallor, corneas clear,    Ears:  Ears appear intact with no abnormalities noted   Throat: No oral lesions, no thrush,  oral mucosa moist   Neck: No adenopathy, supple, trachea midline, no thyromegaly,    Back:   No kyphosis present, no scoliosis present, no skin lesions,      erythema or scars, no tenderness to percussion or                   palpation,range of motion normal   Lungs:   C respirations regular, even and                 unlabored    Heart:  Regular rhythm and normal rate               Chest Wall:  No abnormalities observed               Pulses: Pulses palpable and equal bilaterally   Skin: No bleeding, bruising or rash   Lymph nodes: No palpable adenopathy   Neurologic: Appears neurologic intact             Assessment:    Traumatic complete tear of right rotator cuff          Plan: All risks, benefits and alternatives were discussed.  Risks including but not exclusive to anesthetic complications, including death, MI, CVA, infection, bleeding DVT, PE,  fracture, residual pain and need for future surgery.  Patient understood all and agrees to proceed.

## 2024-03-13 NOTE — DISCHARGE INSTRUCTIONS
What to expect after a Nerve Block    Nerve blocks administered to block pain affect many types of nerves, including those nerves that control movement, pain, and normal sensation. Following a nerve block, you may notice some bruising at the site where the block was given. You may experience sensations such as: numbness of the affected area or limb, tingling, heaviness (that is the limb feels heavy to you), weakness or inability to move the affected arm or leg, or a feeling as if your arm or leg has “fallen asleep.”     A nerve block can last from 2 to 36 hours depending on the medications used.  Usually the weakness wears off first followed by the tingling and heaviness. As the block wears off, you may begin to notice pain; however, this sequence of events may occur in any order. Typically, you will be able to move your limb before you will feel it. Once a nerve block begins to wear off, the effects are usually completely gone within 60 minutes.  If you experience continued side effects that you believe are block related for longer than 48 hours, please call your healthcare provider. Please see block-specific instructions below.    Instructions for any block involving the shoulder or arm  If you have had any kind of shoulder/arm block, you will go home with your arm in a sling. Wear the sling until the block has completely worn off. You may be required to wear it for a longer period of time per your surgeon’s recommendations.  If you have had a shoulder/arm block, it is a good idea to sleep on a recliner with pillows under your arm.    You may experience symptoms such as:  Shortness of breath  Hoarseness   Blurry vision  Unequal pupils  Drooping of your face on the same side as the block was performed    These are side effects associated with this kind of block and should go away within 12 hours.    Note: If you have severe or prolonged shortness of breath, please seek medical assistance as soon as possible.      Protection of a “blocked” arm or leg (limb)  After a nerve block, you cannot feel pain, pressure, or extremes of temperature in the affected limb. And because of this, your blocked limb is at more risk for injury. For example, it is possible to burn your limb on an extremely hot surface without feeling it.     When resting, it is important to reposition your limb periodically to avoid prolonged pressure on it. This may require the use of pillows and padding.    While sleeping, you should avoid rolling onto the affected limb or putting too much pressure on it.     If you have a cast or tight dressing, check the color of your fingers or toes of the affected limb. Call your surgeon if they look discolored (that is, dusky, dark colored).    Use caution in cold weather. Cover your limb appropriately to protect it from the cold.      Pain Management:    Your surgeon will give you a prescription for pain medication. Begin taking this before the nerve block wears off. Bear in mind that sometimes the block can wear off in the middle of the night.              Pendulum Exercises for Post Op Shoulder Surgery        Lean over with your good arm supported on a table or chair.  Relax the injured arm and allow it to hang straight down at your side.  Slowly begin to swing the relaxed arm back and forth.  Then swing it side to side.  Next, move it in a [place]. Now,  reverse the direction.        Generally, you should spend about 5 minutes doing this exercise.  It should be done 2-3 times daily or as directed by your physician.

## 2024-03-13 NOTE — ANESTHESIA PREPROCEDURE EVALUATION
Anesthesia Evaluation     no history of anesthetic complications:   NPO Solid Status: > 8 hours  NPO Liquid Status: > 2 hours           Airway   Mallampati: II  Neck ROM: full  no difficulty expected  Dental      Comment: Some missing teeth    Pulmonary - negative pulmonary ROS and normal exam   (-) COPD, asthma, sleep apnea, not a smoker    PE comment: nonlabored  Cardiovascular - normal exam  Exercise tolerance: good (4-7 METS)    Rhythm: regular  Rate: normal    (+) hyperlipidemia  (-) hypertension, valvular problems/murmurs, past MI, CAD, dysrhythmias, angina      Neuro/Psych- negative ROS  (-) seizures, TIA, CVA  GI/Hepatic/Renal/Endo    (+) morbid obesity, GERD  (-) liver disease, no renal disease, no thyroid disorderDiabetes: preDM.    Musculoskeletal     (+) arthralgias      ROS comment: Traumatic complete tear of right rotator cuff  Abdominal    Substance History      OB/GYN          Other   arthritis,                 Anesthesia Plan    ASA 3     general     (ISB for post-op pain PSR)  intravenous induction     Anesthetic plan, risks, benefits, and alternatives have been provided, discussed and informed consent has been obtained with: patient.    CODE STATUS:

## 2024-03-13 NOTE — ANESTHESIA POSTPROCEDURE EVALUATION
Patient: Coletet Whitlock    Procedure Summary       Date: 03/13/24 Room / Location: [facility] [address] [place] [address] [facility]    Anesthesia Start: 0657 Anesthesia Stop: 0846    Procedure: SHOULDER ARTHROSCOPY LABERAL DEBRIDEMENT, DECOMPRESSION, WITH WITH  MINI OPEN ROTATOR CUFF REPAIR (Right: Shoulder) Diagnosis:       Traumatic complete tear of right rotator cuff, subsequent encounter      (Traumatic complete tear of right rotator cuff, subsequent encounter [S46.011D])    Surgeons: Makenzie Barnett MD Provider: Quirino Dias MD    Anesthesia Type: general ASA Status: 3            Anesthesia Type: general    Vitals  Vitals Value Taken Time   /86 03/13/24 0915   Temp 36.3 °C (97.3 °F) 03/13/24 0915   Pulse 88 03/13/24 0917   Resp 17 03/13/24 0915   SpO2 92 % 03/13/24 0915   Vitals shown include unfiled device data.        Post Anesthesia Care and Evaluation    Patient location during evaluation: bedside  Patient participation: complete - patient participated  Level of consciousness: awake and alert  Pain management: adequate    Airway patency: patent  Anesthetic complications: No anesthetic complications  PONV Status: controlled  Cardiovascular status: blood pressure returned to baseline and acceptable  Respiratory status: acceptable  Hydration status: acceptable

## 2024-03-13 NOTE — OP NOTE
Rotator Cuff RepairOperative Note      Facility: [facility]  Patient Name: Colette Whitlock  YOB: 1965  Date: 3/13/2024  Medical Record Number: [id number]      Pre-op Diagnosis:   Traumatic complete tear of right rotator cuff, subsequent encounter [S46.011D]    Post-Op Diagnosis Codes:     * Traumatic complete tear of right rotator cuff, subsequent encounter [S46.011D]  Degenerative labral tear, impingement  Procedure(s):  SHOULDER ARTHROSCOPY LABRAL DEBRIDEMENT, DECOMPRESSION, WITH WITH  MINI OPEN ROTATOR CUFF REPAIR with Arthrex fiber tack speed bridge    Surgeon(s):  Makenzie Barnett MD    Anesthesia: General with Block  Anesthesiologist: Quirino Dias MD  CRNA: Deisy He CRNA    Staff:   Circulator: Denise Craig RN; Laila Juarez. JAYCEE Amezquita  Scrub Person: Romel Barron  Vendor Representative: Andreas Ruiz; Max Mendoza  Assistant: Susanne Bernal CSA    Assistant: Susanne Bernal CSA  First assist, first assist was used throughout the case for proposition the patient on the table, position extremity during the procedure and closure  Estimated Blood Loss: 10cc    Specimens:   * No orders in the log *    Drains: None    Findings: See Dictation    Complications: None    Indication for procedure:   This patient has had a several month history of right shoulder pain that has been unresponsive to conservative management.  They have an exam and an MRI which are consistent with rotator cuff pathology and they present for arthroscopy and possible repair.  They understand all risks, benefits, and alternatives.  Risk including but not exclusive to anesthetic complications including death, MI, CVA as well as infection, bleeding, DVT, PE, stiffness, failure to relieve their symptoms and need for future surgery.  They understand this and agree to proceed.    Description of procedure:  Patient was taken to the operating room.  They were placed supine on the   operating room table.  After induction of adequate LMA anesthesia and scalene nerve block and IV antibiotics.  They underwent exam under anesthesia was symmetric full range of motion which was symmetric side to side.  They were then placed in the modified beachchair position all prominent areas well padded and head well stabilized.  The arm was prepped and draped  in usual sterile fashion, bony landmarks were demarcated and the joint was infiltrated with 30 mL of fluid.  A standard posterior portal was made inferior and medial to the posterior lateral edge of the acromion with an 11 blade.  Blunt trocar penetrated into the joint, scope followed  and evaluation began.  An anterior portal was established in the interval between the subscapularis and the biceps tendon with spinal needle localization and direct visualization. She was found have a degenerative tear of the superior aspect of the labrum that was debrided with the Apollo device and the motorized shaver.  The rotator cuff appeared pathologic.    I then exited the space, entered subacromial space. I made accessory lateral portal off the anterior lateral edge of the acromion, placed the shaver and removed thickened bursa. I delineated the anterior lateral edge of the acromion with the Opus device and removed a large spur with a bone cutter. There was plenty of room under this joint after this for the rotator cuff.  The rotator cuff was evaluated and was found to have a large full-thickness tear.  I did place a retention stitch with the Scorpion device.   3 fiber tack suture anchors were placed along the medial row just along the edge of the cartilage.  This was done in standard technique with good bony pullout.  I used the scorpion device to place the loop fiber tape 1 anterior 1 central and 1 posterior I also use my retention stitch and placed another mattress stitch medially these were then cut, crossed and placed into lateral row anchors these were fiber  tack anchors as well in standard technique.  Had good approximation of the tendon of the bone.  There was a small area anteriorly and a small area posteriorly of the cuff that needed to be captured I used the sliding suture in each of the 2 lateral row anchors to capture these in mattress suture fashion.     The decompression was adequate. Everything was thoroughly irrigated. The deltoid is reapproximated with 0 Vicryl, subcutaneous tissue with 2-0 Vicryl. The skin was closed with a running 4-0 subcuticular stitch. Sterile dressings and Steri-Strips were applied. The portals were closed with 2-0 Vicryl. Sling was applied. The patient was taken to recovery room in good condition.  All sponge and needle counts were correct.      Date: 3/13/2024  Time: 08:29 BLOSSOM chance

## 2024-03-13 NOTE — ANESTHESIA PROCEDURE NOTES
Peripheral Block      Patient reassessed immediately prior to procedure    Patient location during procedure: pre-op  Start time: 3/13/2024 6:23 AM  Stop time: 3/13/2024 6:25 AM  Reason for block: at surgeon's request and post-op pain management  Performed by  Anesthesiologist: Ankit Golden MD  Preanesthetic Checklist  Completed: patient identified, IV checked, site marked, risks and benefits discussed, surgical consent, monitors and equipment checked, pre-op evaluation and timeout performed  Prep:  Sterile barriers:cap, gloves, mask, washed/disinfected hands and alcohol skin prep  Prep: ChloraPrep  Patient monitoring: blood pressure monitoring, continuous pulse oximetry and EKG  Procedure    Sedation: yes    Guidance:ultrasound guided    ULTRASOUND INTERPRETATION.  Using ultrasound guidance a 21 G gauge needle was placed in close proximity to the brachial plexus nerve, at which point, under ultrasound guidance anesthetic was injected in the area of the nerve and spread of the anesthesia was seen on ultrasound in close proximity thereto.  There were no abnormalities seen on ultrasound; a digital image was taken; and the patient tolerated the procedure with no complications. Images:still images obtained, printed/placed on chart    Laterality:right  Block Type:interscalene  Injection Technique:single-shotNeedle Gauge:21 G  Loss of resistance: normal.    Medications Used: bupivacaine liposome (EXPAREL) 1.3 % injection - Infiltration   10 mL - 3/13/2024 6:25:00 AM  bupivacaine PF (MARCAINE) 0.25 % injection - Injection   10 mL - 3/13/2024 6:25:00 AM  bupivacaine (PF) (MARCAINE) 0.5 % injection - Injection   10 mL - 3/13/2024 6:25:00 AM      Medications  Comment:Ultrasound Interpretation:  Ultrasound guidance utilized for visualization of needle approach to brachial plexus at interscalene level and verification of local anesthetic disbursement to surrounding tissues. Photo printed and placed on chart for  reference.    Post Assessment  Injection Assessment: negative aspiration for heme, no paresthesia on injection and incremental injection  Patient Tolerance:comfortable throughout block  Complications:no

## 2024-03-13 NOTE — ANESTHESIA PROCEDURE NOTES
Airway  Urgency: elective    Date/Time: 3/13/2024 7:07 AM  Airway not difficult    General Information and Staff    Patient location during procedure: OR  CRNA/CAA: Deisy He CRNA    Indications and Patient Condition  Indications for airway management: airway protection    Preoxygenated: yes  MILS maintained throughout  Mask difficulty assessment: 1 - vent by mask    Final Airway Details  Final airway type: endotracheal airway      Successful airway: ETT  Cuffed: yes   Successful intubation technique: direct laryngoscopy  Facilitating devices/methods: intubating stylet  Endotracheal tube insertion site: oral  Blade: Ivory  Blade size: 3  ETT size (mm): 7.0  Cormack-Lehane Classification: grade IIa - partial view of glottis  Placement verified by: chest auscultation and capnometry   Measured from: lips  ETT/EBT  to lips (cm): 22  Number of attempts at approach: 1  Assessment: lips, teeth, and gum same as pre-op and atraumatic intubation

## 2024-03-25 ENCOUNTER — OFFICE VISIT (OUTPATIENT)
Dept: ORTHOPEDIC SURGERY | Facility: CLINIC | Age: 59
End: 2024-03-25
Payer: COMMERCIAL

## 2024-03-25 VITALS — HEIGHT: 55 IN | WEIGHT: 263.4 LBS | TEMPERATURE: 98.2 F | BODY MASS INDEX: 60.96 KG/M2

## 2024-03-25 DIAGNOSIS — Z98.890 S/P ROTATOR CUFF REPAIR: Primary | ICD-10-CM

## 2024-03-25 PROCEDURE — 99024 POSTOP FOLLOW-UP VISIT: CPT | Performed by: ORTHOPAEDIC SURGERY

## 2024-03-25 RX ORDER — OXYCODONE HYDROCHLORIDE AND ACETAMINOPHEN 5; 325 MG/1; MG/1
1 TABLET ORAL EVERY 4 HOURS PRN
Qty: 45 TABLET | Refills: 0 | Status: SHIPPED | OUTPATIENT
Start: 2024-03-25

## 2024-03-27 ENCOUNTER — TREATMENT (OUTPATIENT)
Dept: PHYSICAL THERAPY | Facility: CLINIC | Age: 59
End: 2024-03-27
Payer: COMMERCIAL

## 2024-03-27 DIAGNOSIS — M75.101 TEAR OF RIGHT ROTATOR CUFF, UNSPECIFIED TEAR EXTENT, UNSPECIFIED WHETHER TRAUMATIC: ICD-10-CM

## 2024-03-27 DIAGNOSIS — M25.511 RIGHT SHOULDER PAIN, UNSPECIFIED CHRONICITY: ICD-10-CM

## 2024-03-27 DIAGNOSIS — R29.898 WEAKNESS OF RIGHT ARM: ICD-10-CM

## 2024-03-27 DIAGNOSIS — M25.611 DECREASED RIGHT SHOULDER RANGE OF MOTION: ICD-10-CM

## 2024-03-27 DIAGNOSIS — Z98.890 STATUS POST ROTATOR CUFF REPAIR: Primary | ICD-10-CM

## 2024-03-27 NOTE — PROGRESS NOTES
Physical Therapy Initial Evaluation and Plan of Care  [address] [facility], [address]  [place] [address]    Patient: Colette Whitlock   : 1965  Diagnosis/ICD-10 Code:  Status post rotator cuff repair [Z98.890]  Referring practitioner: Makenzie Barnett MD  Date of Initial Visit: 3/27/2024  Today's Date: 3/27/2024  Patient seen for 1 session         Visit Diagnoses:    ICD-10-CM ICD-9-CM   1. Status post rotator cuff repair  Z98.890 V45.89   2. Tear of right rotator cuff, unspecified tear extent, unspecified whether traumatic  M75.101 840.4   3. Right shoulder pain, unspecified chronicity  M25.511 719.41   4. Decreased right shoulder range of motion  M25.611 719.51   5. Weakness of right arm  R29.898 729.89         Subjective Questionnaire: QuickDASH: 81.82      Subjective Evaluation    History of Present Illness  Mechanism of injury: Patient is a 59 year old female who presents to our clinic following right rotator cuff repair on 3/13/24.  Patient was seen at our facility prior to the surgery, but no significant improvements were made.  Patient underwent the surgery to repair the tear.  Patient reports that the shoulder gets better everyday.  Reports using ice on the shoulder frequently and the pain medication when she needs to.      Patient Occupation: [employer]   Precautions and Work Restrictions: currently offPain  Current pain ratin  At worst pain ratin  Location: right anterior shoulder extending down to the elbow  Quality: sharp  Relieving factors: ice  Aggravating factors: movement  Progression: improved    Hand dominance: right             Objective          Observations     Additional Shoulder Observation Details  Patient presents with a post-op abduction sling on the right UE.  Incisions are covered with steri-strips.    Palpation     Additional Palpation Details  TTP to the right anterior shoulder.    Active Range of Motion     Additional Active Range of Motion Details  N/T secondary  to haling restraints.    Passive Range of Motion     Right Shoulder   Flexion: Right shoulder passive forward flexion: about 90.   Abduction: Right shoulder passive abduction: about 90.   External rotation 45°: Right shoulder passive external rotation at 45 degrees: about 30.   Internal rotation 45°: Right shoulder passive internal rotation at 45 degrees: about 40.     Strength/Myotome Testing     Additional Strength Details  N/T secondary to haling restraints.    Tests     Additional Tests Details  N/T secondary to haling restraints.          Assessment & Plan       Assessment  Impairments: abnormal or restricted ROM, activity intolerance, impaired physical strength, lacks appropriate home exercise program and pain with function   Assessment details: Patient is a 59 year old female who presents after right shoulder surgery with c/o pain, TTP, limited ROM and decreased right UE strength which is limiting her ability to perform ADL'S.  Barriers to therapy: none  Prognosis: good  Prognosis details: STG's to be met by 2 weeks  1)  Independent with HEP  2)  Decrease pain by 50% or more to allow for patient to perform activities during the day  3)  PROM right shoulder flexion to 145 or more to allow for AROM when she gets to this point, which will help with fixing her hair and reaching into cabinets  4)  PROM right shoulder abd to 145 or more to allow for AROM when she gets to this point, which will allow for reaching out to the side  5)  Patient to sleep up to 3 hours before the shoulder pain wakes her up    LTG's to be met by 4 weeks  1)  Independent with HEP progression  2)  Decrease pain by 75% or more to allow for patient to perform activities during the day  3)  PROM right shoulder flexion and abd to 165 or more to allow for return to full AROM  4)  PROM right shoulder ER to 60  5)  PROM right shoulder IR to the stomach to allow for return to functional IR  6)  Patient to have no increase in pain once released from  wearing the sling  7)  Patient to sleep up to 5 hours without shoulder pain         Plan  Therapy options: will be seen for skilled therapy services  Planned modality interventions: TENS and cryotherapy  Planned therapy interventions: strengthening, stretching, therapeutic activities, home exercise program and manual therapy  Frequency: 2x week  Duration in weeks: 4  Treatment plan discussed with: patient  Plan details: At 4 weeks, plan to reassess and set new goals.            Timed:         Manual Therapy:    8     mins  73211;     Therapeutic Exercise:    9     mins  35992;     Neuromuscular Doreen:    0    mins  40080;    Therapeutic Activity:     8     mins  00020;     Gait Trainin     mins  16013;     Ultrasound:     0     mins  81517;          Un-Timed:  Electrical Stimulation:    15     mins  15231 ( );    Low Eval     15     Mins  48291  Mod Eval     0     Mins  49099  High Eval                       0     Mins  14887        Timed Treatment:   25   mins   Total Treatment:     55   mins          PT: Mich Cruz PT     [place] License [id number]  Electronically signed by Mich Cruz PT, 24, 9:18 AM EDT    Certification Period: 3/27/2024 thru 2024  I certify that the therapy services are furnished while this patient is under my care.  The services outlined above are required by this patient, and will be reviewed every 90 days.    Makenzie Barnett Md  [address] [place] [address]   NPI: [id number]      Mich Cruz PT   License number: [id number]        Physician Signature:__________________________________________________    PHYSICIAN: Makenzie Barnett MD      DATE:     Please sign and return via fax to [email or web address] . Thank you, [facility].

## 2024-03-29 ENCOUNTER — TREATMENT (OUTPATIENT)
Dept: PHYSICAL THERAPY | Facility: CLINIC | Age: 59
End: 2024-03-29
Payer: COMMERCIAL

## 2024-03-29 DIAGNOSIS — R29.898 WEAKNESS OF RIGHT ARM: ICD-10-CM

## 2024-03-29 DIAGNOSIS — M75.101 TEAR OF RIGHT ROTATOR CUFF, UNSPECIFIED TEAR EXTENT, UNSPECIFIED WHETHER TRAUMATIC: ICD-10-CM

## 2024-03-29 DIAGNOSIS — Z98.890 STATUS POST ROTATOR CUFF REPAIR: Primary | ICD-10-CM

## 2024-03-29 DIAGNOSIS — M25.511 RIGHT SHOULDER PAIN, UNSPECIFIED CHRONICITY: ICD-10-CM

## 2024-03-29 DIAGNOSIS — M25.611 DECREASED RIGHT SHOULDER RANGE OF MOTION: ICD-10-CM

## 2024-03-29 NOTE — PROGRESS NOTES
"Physical Therapy Daily Treatment Note               [address] [facility]                                                                                                                                             [place] [address]    Patient: Colette Whitlock   : 1965  Referring practitioner: Makenzie Barnett MD  Date of Initial Visit: Type: THERAPY  Noted: 3/27/2024  Today's Date: 3/29/2024  Patient seen for 2 sessions       Visit Diagnoses:    ICD-10-CM ICD-9-CM   1. Status post rotator cuff repair  Z98.890 V45.89   2. Tear of right rotator cuff, unspecified tear extent, unspecified whether traumatic  M75.101 840.4   3. Right shoulder pain, unspecified chronicity  M25.511 719.41   4. Decreased right shoulder range of motion  M25.611 719.51   5. Weakness of right arm  R29.898 729.89       Subjective Evaluation    History of Present Illness    Subjective comment: Pt reports she has had increased pain in (R) shoulder since doing her exercises yesterday.Pain  Current pain ratin           Objective   See Exercise, Manual, and Modality Logs for complete treatment.       Assessment & Plan       Assessment  Assessment details: Pt completed treatment with frequent c/o pain with PROM.  PROM flexion was very limited at start, but by end she was able to achieve @90 degrees. She tolerated the rest of her exercises with few c/o pain.  Pt responded well to ice and e stim s/p treatment for pain control.  She note that her pain was \"much better\" after e stim.  Continue to progress per protocol.          Timed:         Manual Therapy:    10     mins  51232;     Therapeutic Exercise:    23     mins  38524;     Neuromuscular Doreen:    0    mins  64163;    Therapeutic Activity:     0     mins  33725;     Gait Trainin     mins  98454;     Ultrasound:     0     mins  62481;    E Stim                            15    mins   19078( g0283)  Work Dougherty/Cond      0    mins   39140        Timed Treatment:  "  33   mins   Total Treatment:     48   mins    Joni Michaels, PTA  [place] License: [id number]

## 2024-04-01 ENCOUNTER — TREATMENT (OUTPATIENT)
Dept: PHYSICAL THERAPY | Facility: CLINIC | Age: 59
End: 2024-04-01
Payer: COMMERCIAL

## 2024-04-01 DIAGNOSIS — M25.511 RIGHT SHOULDER PAIN, UNSPECIFIED CHRONICITY: ICD-10-CM

## 2024-04-01 DIAGNOSIS — M25.611 DECREASED RIGHT SHOULDER RANGE OF MOTION: ICD-10-CM

## 2024-04-01 DIAGNOSIS — R29.898 WEAKNESS OF RIGHT ARM: ICD-10-CM

## 2024-04-01 DIAGNOSIS — M75.101 TEAR OF RIGHT ROTATOR CUFF, UNSPECIFIED TEAR EXTENT, UNSPECIFIED WHETHER TRAUMATIC: ICD-10-CM

## 2024-04-01 DIAGNOSIS — Z98.890 STATUS POST ROTATOR CUFF REPAIR: Primary | ICD-10-CM

## 2024-04-01 NOTE — PROGRESS NOTES
Physical Therapy Daily Treatment Note  [address] [facility] [address], [address]  [place] [address]      Patient: Colette Whitlock   : 1965  Referring practitioner: Makenzie Barnett MD  Date of Initial Visit: Type: THERAPY  Noted: 3/27/2024  Today's Date: 2024  Patient seen for 3 sessions       Visit Diagnoses:    ICD-10-CM ICD-9-CM   1. Status post rotator cuff repair  Z98.890 V45.89   2. Tear of right rotator cuff, unspecified tear extent, unspecified whether traumatic  M75.101 840.4   3. Right shoulder pain, unspecified chronicity  M25.511 719.41   4. Decreased right shoulder range of motion  M25.611 719.51   5. Weakness of right arm  R29.898 729.89          Subjective   Patient reports pain in the shoulder rated at /10.  Reports not taking any pain pills over the weekend.    Objective          Passive Range of Motion     Right Shoulder   Flexion: Right shoulder passive forward flexion: about 75.   Abduction: Right shoulder passive abduction: about 90.       See Exercise, Manual, and Modality Logs for complete treatment.       Assessment/Plan  Subjective reports are improved from the previous visit (8/10).  Patient had increased muscle guarding with PROM today and more pain than the eval.  PROM flexion is less than at the eval (90 degrees), but this can be attributed to more pain and muscle guarding with it today.  Patient had increased pain and tightness toward the end of the reps with cane ER.      Timed:         Manual Therapy:    8     mins  49129;     Therapeutic Exercise:    16     mins  13576;    Neuromuscular Doreen:    0    mins  36906;    Therapeutic Activity:     8     mins  66925;     Gait Training      0    mins  38491;  Work Conditioning     0   mins  57182       Untimed:  Electrical Stimulation:    15     mins  85042 ( );      Timed Treatment:   32   mins   Total Treatment:     47   mins    Mich Cruz, PT  [place] License: [id number]

## 2024-04-04 ENCOUNTER — TREATMENT (OUTPATIENT)
Dept: PHYSICAL THERAPY | Facility: CLINIC | Age: 59
End: 2024-04-04
Payer: COMMERCIAL

## 2024-04-04 DIAGNOSIS — M75.101 TEAR OF RIGHT ROTATOR CUFF, UNSPECIFIED TEAR EXTENT, UNSPECIFIED WHETHER TRAUMATIC: ICD-10-CM

## 2024-04-04 DIAGNOSIS — M25.511 RIGHT SHOULDER PAIN, UNSPECIFIED CHRONICITY: ICD-10-CM

## 2024-04-04 DIAGNOSIS — R29.898 WEAKNESS OF RIGHT ARM: ICD-10-CM

## 2024-04-04 DIAGNOSIS — Z98.890 STATUS POST ROTATOR CUFF REPAIR: Primary | ICD-10-CM

## 2024-04-04 DIAGNOSIS — M25.611 DECREASED RIGHT SHOULDER RANGE OF MOTION: ICD-10-CM

## 2024-04-04 NOTE — PROGRESS NOTES
Physical Therapy Daily Treatment Note               [address] [facility] [address]                                                                                                                                             [place] [address]    Patient: Colette Whitlock   : 1965  Referring practitioner: Makenzie Barnett MD  Date of Initial Visit: Type: THERAPY  Noted: 3/27/2024  Today's Date: 2024  Patient seen for 4 sessions       Visit Diagnoses:    ICD-10-CM ICD-9-CM   1. Status post rotator cuff repair  Z98.890 V45.89   2. Tear of right rotator cuff, unspecified tear extent, unspecified whether traumatic  M75.101 840.4   3. Right shoulder pain, unspecified chronicity  M25.511 719.41   4. Decreased right shoulder range of motion  M25.611 719.51   5. Weakness of right arm  R29.898 729.89       Subjective Evaluation    History of Present Illness    Subjective comment: Pt reports that she  not taken any pain med since her last treatment until this morning. She states she only took half the dose.  Current pain rating is 5/10.       Objective   See Exercise, Manual, and Modality Logs for complete treatment.       Assessment & Plan       Assessment  Assessment details: Pt continues to be very guarded during PROM manual stretching, She benefits from frequent vc to relax her shoulder.  Pt tolerated the rest of her exercises very well.  Added table slides and walk backs for improved shoulder flexion ROM. Pt continues to respond well to ice and e stim for pain control s/p treatment.   Continue to progress per POC.          Timed:         Manual Therapy:    12     mins  22256;     Therapeutic Exercise:    26     mins  71442;     Neuromuscular Doreen:    0    mins  82462;    Therapeutic Activity:     0     mins  98092;     Gait Trainin     mins  29597;     Ultrasound:     0     mins  74033;    E Stim                            15    mins   02412( g0283)  Work Dougherty/Cond      0    mins    98410        Timed Treatment:   38   mins   Total Treatment:     53   mins    Joni Michaels, PTA  [place] License: [id number]

## 2024-04-08 ENCOUNTER — TREATMENT (OUTPATIENT)
Dept: PHYSICAL THERAPY | Facility: CLINIC | Age: 59
End: 2024-04-08
Payer: COMMERCIAL

## 2024-04-08 DIAGNOSIS — M25.611 DECREASED RIGHT SHOULDER RANGE OF MOTION: ICD-10-CM

## 2024-04-08 DIAGNOSIS — Z98.890 STATUS POST ROTATOR CUFF REPAIR: Primary | ICD-10-CM

## 2024-04-08 DIAGNOSIS — M75.101 TEAR OF RIGHT ROTATOR CUFF, UNSPECIFIED TEAR EXTENT, UNSPECIFIED WHETHER TRAUMATIC: ICD-10-CM

## 2024-04-08 DIAGNOSIS — R29.898 WEAKNESS OF RIGHT ARM: ICD-10-CM

## 2024-04-08 DIAGNOSIS — M25.511 RIGHT SHOULDER PAIN, UNSPECIFIED CHRONICITY: ICD-10-CM

## 2024-04-08 NOTE — PROGRESS NOTES
Physical Therapy Daily Treatment Note  [address] [facility], [address]  [place] [address]      Patient: Colette Whitlock   : 1965  Referring practitioner: Makenzie Barnett MD  Date of Initial Visit: Type: THERAPY  Noted: 3/27/2024  Today's Date: 2024  Patient seen for 5 sessions       Visit Diagnoses:    ICD-10-CM ICD-9-CM   1. Status post rotator cuff repair  Z98.890 V45.89   2. Tear of right rotator cuff, unspecified tear extent, unspecified whether traumatic  M75.101 840.4   3. Right shoulder pain, unspecified chronicity  M25.511 719.41   4. Decreased right shoulder range of motion  M25.611 719.51   5. Weakness of right arm  R29.898 729.89           Subjective   Patient reports that there is some improvement every day.  Currently rates the pain at 5/10.    Objective          Active Range of Motion     Additional Active Range of Motion Details  Patient has improved scapular mobility with retraction and shrugs (bilaterally equal).    Passive Range of Motion     Right Shoulder   Flexion: Right shoulder passive forward flexion: about 90.   Abduction: Right shoulder passive abduction: about 95.     Additional Passive Range of Motion Details  Patient had increased pain on the way down from flexion and abduction PROM.      See Exercise, Manual, and Modality Logs for complete treatment.       Assessment/Plan  Subjective reports of pain remain the same.  Patient continues to have increased guarding with PROM, especially for flexion and abduction.  Improvements have been made with scapular mobility, which is now bilaterally equal.  Patient performed the routine without a significant increase in pain in the shoulder.      Timed:         Manual Therapy:    9     mins  28288;     Therapeutic Exercise:    18     mins  04309;     Neuromuscular Doreen:    0    mins  22426;    Therapeutic Activity:     8     mins  50776;     Gait Training      0    mins  40271;  Work Conditioning     0   mins  81411        Untimed:  Electrical Stimulation:    15     mins  64524 ( );      Timed Treatment:   35   mins   Total Treatment:     50   mins    Mich Cruz, PT  [place] License: [id number]

## 2024-04-10 ENCOUNTER — TREATMENT (OUTPATIENT)
Dept: PHYSICAL THERAPY | Facility: CLINIC | Age: 59
End: 2024-04-10
Payer: COMMERCIAL

## 2024-04-10 DIAGNOSIS — Z98.890 STATUS POST ROTATOR CUFF REPAIR: Primary | ICD-10-CM

## 2024-04-10 DIAGNOSIS — M75.101 TEAR OF RIGHT ROTATOR CUFF, UNSPECIFIED TEAR EXTENT, UNSPECIFIED WHETHER TRAUMATIC: ICD-10-CM

## 2024-04-10 DIAGNOSIS — R29.898 WEAKNESS OF RIGHT ARM: ICD-10-CM

## 2024-04-10 DIAGNOSIS — M25.511 RIGHT SHOULDER PAIN, UNSPECIFIED CHRONICITY: ICD-10-CM

## 2024-04-10 DIAGNOSIS — M25.611 DECREASED RIGHT SHOULDER RANGE OF MOTION: ICD-10-CM

## 2024-04-10 NOTE — PROGRESS NOTES
Physical Therapy Daily Treatment Note  [address], [address]  [place] [address]      Patient: Colette Whitlock   : 1965  Referring practitioner: Makenzie Barnett MD  Date of Initial Visit: Type: THERAPY  Noted: 3/27/2024  Today's Date: 4/10/2024  Patient seen for 6 sessions       Visit Diagnoses:    ICD-10-CM ICD-9-CM   1. Status post rotator cuff repair  Z98.890 V45.89   2. Tear of right rotator cuff, unspecified tear extent, unspecified whether traumatic  M75.101 840.4   3. Right shoulder pain, unspecified chronicity  M25.511 719.41   4. Decreased right shoulder range of motion  M25.611 719.51   5. Weakness of right arm  R29.898 729.89           Subjective   Patient reports that the shoulder is ok, rates the pain at 4/10.    Objective          Passive Range of Motion     Right Shoulder   Flexion: Right shoulder passive forward flexion: about 95.   Abduction: Right shoulder passive abduction: about 90.       See Exercise, Manual, and Modality Logs for complete treatment.       Assessment/Plan  Subjective reports are slightly improved from the previous visit (5/10).  PROM continues to be difficult to progress due to increased muscle guarding.  Flexion PROM was about 5 degrees better today than the previous visit.  Patient continues to have increased pain on the way down from flexion and abduction PROM.  Scapular mobility continues to be bilaterally equal as compared with the left side.  Patient performed the routine without significant c/o.      Timed:         Manual Therapy:    9     mins  93108;     Therapeutic Exercise:    11     mins  23195;     Neuromuscular Doreen:    0    mins  65571;    Therapeutic Activity:     8     mins  44154;     Gait Training      0    mins  12279;  Work Conditioning     0   mins  05076       Untimed:  Electrical Stimulation:    15     mins  31066 ( );      Timed Treatment:   28   mins   Total Treatment:     43   mins    Mich Cruz, PT  [place] License:  [id number]

## 2024-04-15 ENCOUNTER — TREATMENT (OUTPATIENT)
Dept: PHYSICAL THERAPY | Facility: CLINIC | Age: 59
End: 2024-04-15
Payer: COMMERCIAL

## 2024-04-15 DIAGNOSIS — M25.511 RIGHT SHOULDER PAIN, UNSPECIFIED CHRONICITY: ICD-10-CM

## 2024-04-15 DIAGNOSIS — M25.611 DECREASED RIGHT SHOULDER RANGE OF MOTION: ICD-10-CM

## 2024-04-15 DIAGNOSIS — Z98.890 STATUS POST ROTATOR CUFF REPAIR: Primary | ICD-10-CM

## 2024-04-15 DIAGNOSIS — M75.101 TEAR OF RIGHT ROTATOR CUFF, UNSPECIFIED TEAR EXTENT, UNSPECIFIED WHETHER TRAUMATIC: ICD-10-CM

## 2024-04-15 DIAGNOSIS — R29.898 WEAKNESS OF RIGHT ARM: ICD-10-CM

## 2024-04-15 NOTE — PROGRESS NOTES
"Physical Therapy Daily Treatment Note               [address] [facility] [address]                                                                                                                                             [place] [address]    Patient: Colette Whitlock   : 1965  Referring practitioner: Makenzie Barnett MD  Date of Initial Visit: Type: THERAPY  Noted: 3/27/2024  Today's Date: 4/15/2024  Patient seen for 7 sessions       Visit Diagnoses:    ICD-10-CM ICD-9-CM   1. Status post rotator cuff repair  Z98.890 V45.89   2. Tear of right rotator cuff, unspecified tear extent, unspecified whether traumatic  M75.101 840.4   3. Right shoulder pain, unspecified chronicity  M25.511 719.41   4. Decreased right shoulder range of motion  M25.611 719.51   5. Weakness of right arm  R29.898 729.89       Subjective Evaluation    History of Present Illness    Subjective comment: Pt reports that the pain in he (R) shoulder is \"not too much\".  Rates it at 4/10Pain  Current pain ratin         Objective   See Exercise, Manual, and Modality Logs for complete treatment.       Assessment & Plan       Assessment  Assessment details: Pt's PROM continues to be limited by muscle guarding and pain with lowering. Pt needs frequent vc to relax her shoulder during PROM.  Pt tolerated addition of table slides in scaption.  Pt respondes well to ice and E stim s/p treatment.          Timed:         Manual Therapy:    11     mins  23936;     Therapeutic Exercise:    18     mins  50732;     Neuromuscular Doreen:    0    mins  18254;    Therapeutic Activity:     10     mins  12279;     Gait Trainin     mins  84835;     Ultrasound:     0     mins  43634;    E Stim                            15    mins   03620( g0283)  Work Dougherty/Cond      0    mins   24714        Timed Treatment:   39   mins   Total Treatment:     54   mins    Joni Michaels PTA  [place] License: [id number]  "

## 2024-04-17 ENCOUNTER — TREATMENT (OUTPATIENT)
Dept: PHYSICAL THERAPY | Facility: CLINIC | Age: 59
End: 2024-04-17
Payer: COMMERCIAL

## 2024-04-17 DIAGNOSIS — M25.511 RIGHT SHOULDER PAIN, UNSPECIFIED CHRONICITY: ICD-10-CM

## 2024-04-17 DIAGNOSIS — M25.611 DECREASED RIGHT SHOULDER RANGE OF MOTION: ICD-10-CM

## 2024-04-17 DIAGNOSIS — Z98.890 STATUS POST ROTATOR CUFF REPAIR: Primary | ICD-10-CM

## 2024-04-17 DIAGNOSIS — M75.101 TEAR OF RIGHT ROTATOR CUFF, UNSPECIFIED TEAR EXTENT, UNSPECIFIED WHETHER TRAUMATIC: ICD-10-CM

## 2024-04-17 DIAGNOSIS — R29.898 WEAKNESS OF RIGHT ARM: ICD-10-CM

## 2024-04-17 NOTE — LETTER
"             Patient: Colette Whitlock   : 1965  Referring practitioner: Makenzie Barnett MD  Date of Initial Visit: Type: THERAPY  Noted: 3/27/2024  Today's Date: 2024  Patient seen for 8 sessions       Visit Diagnoses:    ICD-10-CM ICD-9-CM   1. Status post rotator cuff repair  Z98.890 V45.89   2. Tear of right rotator cuff, unspecified tear extent, unspecified whether traumatic  M75.101 840.4   3. Right shoulder pain, unspecified chronicity  M25.511 719.41   4. Decreased right shoulder range of motion  M25.611 719.51   5. Weakness of right arm  R29.898 729.89       Subjective Evaluation    History of Present Illness    Subjective comment: Pt reports that she has a constant pain of 5/10 in (R) shoulder.  She states that she has stopped having sudden sharp pains.       Objective   See Exercise, Manual, and Modality Logs for complete treatment.     Assessment & Plan     Assessment  Assessment details: Pt had increased pain with PROM today.  On 4/10 her PROM flexion was listed as \"about 95 degrees.\"  Pt had severe pain and guarding as her (R) shoulder approached 90 degrees today.  Pt is motivated to complete her therapy, but progress has been limited by continued muscle guarding and pain with lowing during PROM manual stretching.  Pt tolerates the rest of her exercises, and responds well to ice and e stim for pain control.         Joni Michaels, PTA  [place] License: [id number]    "

## 2024-04-17 NOTE — PROGRESS NOTES
"Physical Therapy Daily Treatment Note               [address] [facility] [address]                                                                                                                                             [place] [address]    Patient: Colette Whitlock   : 1965  Referring practitioner: Makenzie Barnett MD  Date of Initial Visit: Type: THERAPY  Noted: 3/27/2024  Today's Date: 2024  Patient seen for 8 sessions       Visit Diagnoses:    ICD-10-CM ICD-9-CM   1. Status post rotator cuff repair  Z98.890 V45.89   2. Tear of right rotator cuff, unspecified tear extent, unspecified whether traumatic  M75.101 840.4   3. Right shoulder pain, unspecified chronicity  M25.511 719.41   4. Decreased right shoulder range of motion  M25.611 719.51   5. Weakness of right arm  R29.898 729.89       Subjective Evaluation    History of Present Illness    Subjective comment: Pt reports that she has a constant pain of 5/10 in (R) shoulder.  She states that she has stopped having sudden sharp pains.       Objective   See Exercise, Manual, and Modality Logs for complete treatment.       Assessment & Plan       Assessment  Assessment details: Pt had increased pain with PROM today.  On 4/10 her PROM flexion was listed as \"about 95 degrees.\"  Pt had severe pain and guarding as her (R) shoulder approached 90 degrees today.  Pt is motivated to complete her therapy, but progress has been limited by continued muscle guarding and pain with lowing during PROM manual stretching.  Pt tolerates the rest of her exercises, and responds well to ice and e stim for pain control.           Timed:         Manual Therapy:    10     mins  99682;     Therapeutic Exercise:    20     mins  37720;     Neuromuscular Doreen:    0    mins  78851;    Therapeutic Activity:     10     mins  13264;     Gait Trainin     mins  88516;     Ultrasound:     0     mins  53945;    E Stim                            15    mins   53473(mc " g0283)  Work Dougherty/Cond      0    mins   08230        Timed Treatment:   40   mins   Total Treatment:     55   mins    Joni Michaels, PTA  [place] License: [id number]

## 2024-04-18 NOTE — PROGRESS NOTES
Shoulder Scope RCR follow Up       Patient: Colette Whitlock        YOB: 1965      Chief Complaints: shoulder pain right      History of Present Illness: Pt is here f/u shoulder arthroscopy, RCR on the right she is about 6 weeks out now states she is doing well at nighttime she has pain is when they move into therapy but it is severe pain        Allergies:   Allergies   Allergen Reactions    Penicillin G Unknown (See Comments)     Told this as a child       Medications:   Home Medications:  Current Outpatient Medications on File Prior to Visit   Medication Sig    acetaminophen (TYLENOL) 500 MG tablet Take 1 tablet by mouth Every 6 (Six) Hours As Needed for Mild Pain.    amoxicillin (AMOXIL) 500 MG capsule Take 2 capsules by mouth 2 (Two) Times a Day. (Patient not taking: Reported on 3/25/2024)    Diclofenac Sodium (Voltaren Arthritis Pain) 1 % gel gel Apply 4 g topically to the appropriate area as directed 2 (Two) Times a Day. (Patient not taking: Reported on 3/25/2024)    ketotifen (ZADITOR) 0.025 % ophthalmic solution Apply 1 drop to eye(s) as directed by provider Every 12 (Twelve) Hours As Needed.    montelukast (SINGULAIR) 10 MG tablet Take 1 tablet by mouth every night at bedtime. (Patient not taking: Reported on 3/25/2024)    ondansetron (Zofran) 4 MG tablet Take 1 tablet by mouth Every 8 (Eight) Hours As Needed for Nausea or Vomiting. (Patient not taking: Reported on 3/25/2024)    oxyCODONE-acetaminophen (PERCOCET) 5-325 MG per tablet Take 1-2 tablets by mouth Every 4 (Four) Hours As Needed for Moderate Pain (1 tablet for moderate pain or 2 tablets for severe pain). (Patient not taking: Reported on 3/25/2024)    oxyCODONE-acetaminophen (PERCOCET) 5-325 MG per tablet Take 1 tablet by mouth Every 4 (Four) Hours As Needed for Severe Pain.    psyllium (Metamucil Smooth Texture) 58.6 % powder 1 large tablespoon mixed in 12-15 ounces of water every morning. (Patient not taking: Reported on  3/25/2024)     No current facility-administered medications on file prior to visit.     Current Medications:  Scheduled Meds:  Continuous Infusions:No current facility-administered medications for this visit.    PRN Meds:.          Physical Exam: 59 y.o. female  General Appearance:    Alert, cooperative, in no acute distress                 There were no vitals filed for this visit.   Patient is alert and oriented ×3 no acute distress normal mood physical exam.  Physical exam of the shoulder, incisions looked good there is no erythema,no signs or sx of infection.  Passive flexion is to about 140 external rotation is still very limited is only to about 15 degrees rotator cuff strength is 4/5 I think she is doing fine she is still quite limited her motion I showed her ways to start pushing this passively at home she can start weaning out of her sling she understands her range of motion is the first step we will see her back 6 weeks    Assessment  S/P shoulder scope, rotator cuff repair,

## 2024-04-22 ENCOUNTER — OFFICE VISIT (OUTPATIENT)
Dept: ORTHOPEDIC SURGERY | Facility: CLINIC | Age: 59
End: 2024-04-22
Payer: COMMERCIAL

## 2024-04-22 VITALS — HEIGHT: 61 IN | BODY MASS INDEX: 49.92 KG/M2 | WEIGHT: 264.4 LBS | TEMPERATURE: 97.5 F

## 2024-04-22 DIAGNOSIS — Z98.890 S/P ROTATOR CUFF REPAIR: Primary | ICD-10-CM

## 2024-04-22 PROCEDURE — 99024 POSTOP FOLLOW-UP VISIT: CPT | Performed by: ORTHOPAEDIC SURGERY

## 2024-04-22 NOTE — LETTER
April 22, 2024     Patient: Colette Whitlock   YOB: 1965   Date of Visit: 4/22/2024       To Whom It May Concern:    It is my medical opinion that Colette Wihtlock  will remain off work.           Sincerely,        Makenzie Barnett MD    CC:   No Recipients

## 2024-04-23 ENCOUNTER — TREATMENT (OUTPATIENT)
Dept: PHYSICAL THERAPY | Facility: CLINIC | Age: 59
End: 2024-04-23
Payer: COMMERCIAL

## 2024-04-23 DIAGNOSIS — M25.511 RIGHT SHOULDER PAIN, UNSPECIFIED CHRONICITY: ICD-10-CM

## 2024-04-23 DIAGNOSIS — G89.29 CHRONIC RIGHT SHOULDER PAIN: ICD-10-CM

## 2024-04-23 DIAGNOSIS — M25.611 DECREASED RIGHT SHOULDER RANGE OF MOTION: ICD-10-CM

## 2024-04-23 DIAGNOSIS — M25.511 CHRONIC RIGHT SHOULDER PAIN: ICD-10-CM

## 2024-04-23 DIAGNOSIS — M75.101 TEAR OF RIGHT ROTATOR CUFF, UNSPECIFIED TEAR EXTENT, UNSPECIFIED WHETHER TRAUMATIC: ICD-10-CM

## 2024-04-23 DIAGNOSIS — Z98.890 STATUS POST ROTATOR CUFF REPAIR: Primary | ICD-10-CM

## 2024-04-23 DIAGNOSIS — R29.898 WEAKNESS OF RIGHT ARM: ICD-10-CM

## 2024-04-23 PROCEDURE — 97110 THERAPEUTIC EXERCISES: CPT | Performed by: PHYSICAL THERAPIST

## 2024-04-23 PROCEDURE — 97140 MANUAL THERAPY 1/> REGIONS: CPT | Performed by: PHYSICAL THERAPIST

## 2024-04-23 PROCEDURE — 97014 ELECTRIC STIMULATION THERAPY: CPT | Performed by: PHYSICAL THERAPIST

## 2024-04-23 RX ORDER — MELOXICAM 15 MG/1
15 TABLET ORAL DAILY
Qty: 30 TABLET | Refills: 1 | Status: SHIPPED | OUTPATIENT
Start: 2024-04-23 | End: 2024-06-22

## 2024-04-23 NOTE — PROGRESS NOTES
Physical Therapy Daily Treatment Note      [address], [address], [place] [address]    Patient: Colette Whitlock   : 1965  Referring practitioner: Makenzie Barnett MD  Date of Initial Visit: Type: THERAPY  Noted: 3/27/2024  Today's Date: 2024  Patient seen for 9 sessions         Visit Diagnoses:     ICD-10-CM ICD-9-CM   1. Status post rotator cuff repair  Z98.890 V45.89   2. Tear of right rotator cuff, unspecified tear extent, unspecified whether traumatic  M75.101 840.4   3. Right shoulder pain, unspecified chronicity  M25.511 719.41   4. Decreased right shoulder range of motion  M25.611 719.51   5. Weakness of right arm  R29.898 729.89         Subjective Evaluation    History of Present Illness    Subjective comment: No more shooting pain and not [constant] pain, just sometimes.  I'm getting better.  I tried sleeping in my bed yesterday for the first time since surgery but was only able to sleep 45 minutes. Saw Dr. Barnett yesterday and she is pleased with how I am doing. She said the pain I am having is normal.Pain  Current pain ratin           Objective   See Exercise, Manual, and Modality Logs for complete treatment.       Assessment & Plan       Assessment  Assessment details: Patient exhibits significant muscular guarding of (R) shoulder initially which limits PROM accompanied by significant pain.  However, with repeated verbal and tactile cueing to promote relaxation, moderately improved (R) shoulder PROM is available with notably decreased available end range pain.  Patient expresses strong motivation to return to sleeping in bed.  Her HEP compliance has increased in frequency after Dr. Barnett yesterday encouraged patient to perform walkback shoulder stretch at sink more often.            Other - reassess.         Timed:  Manual Therapy:    14     mins  96320;  Therapeutic Exercise:    23     mins  54430;     Neuromuscular Doreen:        mins  69255;    Therapeutic Activity:           mins  37734;     Gait Training:           mins  60711;     Ultrasound:          mins  77859;    Untimed:  Electrical Stimulation:    15     mins  96929 ( );  Mechanical Traction:         mins  64875;   Dry Needling              ___  mins   20561    Timed Treatment:   37   mins   Total Treatment:     52   mins    Louise Toscano PT, DPT, OCS  Physical Therapist  [place] License #[id number]  Electronically signed by: Louise Toscano PT, 04/23/24, 8:59 AM EDT

## 2024-04-25 ENCOUNTER — TREATMENT (OUTPATIENT)
Dept: PHYSICAL THERAPY | Facility: CLINIC | Age: 59
End: 2024-04-25
Payer: COMMERCIAL

## 2024-04-25 DIAGNOSIS — M25.511 RIGHT SHOULDER PAIN, UNSPECIFIED CHRONICITY: ICD-10-CM

## 2024-04-25 DIAGNOSIS — R29.898 WEAKNESS OF RIGHT ARM: ICD-10-CM

## 2024-04-25 DIAGNOSIS — Z98.890 STATUS POST ROTATOR CUFF REPAIR: Primary | ICD-10-CM

## 2024-04-25 DIAGNOSIS — M25.611 DECREASED RIGHT SHOULDER RANGE OF MOTION: ICD-10-CM

## 2024-04-25 PROCEDURE — 97014 ELECTRIC STIMULATION THERAPY: CPT | Performed by: PHYSICAL THERAPIST

## 2024-04-25 PROCEDURE — 97140 MANUAL THERAPY 1/> REGIONS: CPT | Performed by: PHYSICAL THERAPIST

## 2024-04-25 PROCEDURE — 97110 THERAPEUTIC EXERCISES: CPT | Performed by: PHYSICAL THERAPIST

## 2024-04-25 PROCEDURE — 97530 THERAPEUTIC ACTIVITIES: CPT | Performed by: PHYSICAL THERAPIST

## 2024-04-25 NOTE — PROGRESS NOTES
Re-Assessment / Re-Certification  [facility]  [address], [address], [place] [address]      Patient: Colette Whitlock   : 1965  Diagnosis/ICD-10 Code:  Status post rotator cuff repair [Z98.890]  Referring practitioner: Makenzie Barnett MD  Date of Initial Visit: 2024  Today's Date: 2024  Patient seen for 10 sessions      Subjective:   Subjective Questionnaire: QuickDASH: 79.55%  Clinical Progress: improved  Home Program Compliance: Yes  Treatment has included: therapeutic exercise, neuromuscular re-education, manual therapy, therapeutic activity, electrical stimulation, and cryotherapy    Subjective Evaluation    History of Present Illness    Subjective comment: My shoulder is feeling better/pain improving.  I am not wearing the sling at home, only when I go outside. But I am still sleeping in the chair because I can't [get comfortable to] sleep in bed.Pain  Current pain ratin  Location: (R) shoulder pain         Objective          Passive Range of Motion     Right Shoulder   Flexion: 136 degrees with pain  External rotation 45°: 43 degrees with pain      Assessment & Plan       Assessment  Assessment details: Patient is 6 weeks s/p (R) RCR and has remained motivated and compliant with PT interventions.  She had been significantly pain-limited with ROM activities but within the past week this has notably improved.  She reports (R) shoulder symptoms 5/10 and has transitioned out of her sling except when she is out in the community.  She continues to have to sleep in the recliner instead of bed due to pain-interrupted sleep.  She demonstrates good improvements in (R) shoulder PROM in planes of flexion and ER. Her QuickDASH score remains significantly elevated at 79.55%. She is progressing toward functional PT goals but she will benefit from continued skilled PT services to optimize postoperative recovery of (R) UE.     Goals  Plan Goals: STG's to be met by 4  weeks  1)  Independent with HEP - MET  2)  Decrease pain by 50% or more to allow for patient to perform activities during the day - PARTIALLY MET/IMPROVING  3)  PROM right shoulder flexion to 145 or more to allow for AROM when she gets to this point, which will help with fixing her hair and reaching into cabinets - NOT MET/IMPROVED  4)  PROM right shoulder abd to 145 or more to allow for AROM when she gets to this point, which will allow for reaching out to the side - NOT MET  5)  Patient to sleep up to 3 hours before the shoulder pain wakes her up - MET (4 HOURS) IN CHAIR/NOT IN BED     LTG's to be met by 8 weeks  1)  Independent with HEP progression - NOT MET  2)  Decrease pain by 75% or more to allow for patient to perform activities during the day - NOT MET  3)  PROM right shoulder flexion and abd to 165 or more to allow for return to full AROM - NOT MET  4)  PROM right shoulder ER to 60 - NOT MET  5)  PROM right shoulder IR to the stomach to allow for return to functional IR - NOT MET  6)  Patient to have no increase in pain once released from wearing the sling - MET  7)  Patient to sleep up to 5 hours without shoulder pain - NOT MET        Progress toward previous goals: Partially Met  Recommendations: Continue as planned  Timeframe: 2 months  Prognosis to achieve goals: good    PT Signature: Louise Toscano, PT, DPT, [facility] License # [id number]      Based upon review of the patient's progress and continued therapy plan, it is my medical opinion that Colette Whitlock should continue physical therapy treatment at [facility]  [address] [place] [id number] [phone].    Signature: __________________________________  Makenzie Barnett MD    Manual Therapy:    16     mins  65945;  Therapeutic Exercise:    15     mins  82087;     Neuromuscular Doreen:        mins  47200;    Therapeutic Activity:     10     mins  98838;     Gait Training:           mins   32822;     Ultrasound:          mins  81171;    Electrical Stimulation:    15     mins  78806 ( );  Dry Needling          mins self-pay    Timed Treatment:   41   mins   Total Treatment:     64   mins    Please sign and return via fax to [phone]. Thank you, [facility].

## 2024-04-30 ENCOUNTER — TREATMENT (OUTPATIENT)
Dept: PHYSICAL THERAPY | Facility: CLINIC | Age: 59
End: 2024-04-30
Payer: COMMERCIAL

## 2024-04-30 DIAGNOSIS — Z98.890 STATUS POST ROTATOR CUFF REPAIR: Primary | ICD-10-CM

## 2024-04-30 DIAGNOSIS — M25.511 RIGHT SHOULDER PAIN, UNSPECIFIED CHRONICITY: ICD-10-CM

## 2024-04-30 DIAGNOSIS — R29.898 WEAKNESS OF RIGHT ARM: ICD-10-CM

## 2024-04-30 DIAGNOSIS — M25.611 DECREASED RIGHT SHOULDER RANGE OF MOTION: ICD-10-CM

## 2024-04-30 PROCEDURE — 97140 MANUAL THERAPY 1/> REGIONS: CPT | Performed by: PHYSICAL THERAPIST

## 2024-04-30 PROCEDURE — 97530 THERAPEUTIC ACTIVITIES: CPT | Performed by: PHYSICAL THERAPIST

## 2024-04-30 PROCEDURE — 97110 THERAPEUTIC EXERCISES: CPT | Performed by: PHYSICAL THERAPIST

## 2024-04-30 PROCEDURE — 97014 ELECTRIC STIMULATION THERAPY: CPT | Performed by: PHYSICAL THERAPIST

## 2024-04-30 NOTE — PROGRESS NOTES
Physical Therapy Daily Treatment Note      [address], [address], [place] [address]    Patient: Colette Whitlock   : 1965  Referring practitioner: Makenzie Barnett MD  Date of Initial Visit: Type: THERAPY  Noted: 3/27/2024  Today's Date: 2024  Patient seen for 11 sessions         Visit Diagnoses:     ICD-10-CM ICD-9-CM   1. Status post rotator cuff repair  Z98.890 V45.89   2. Right shoulder pain, unspecified chronicity  M25.511 719.41   3. Decreased right shoulder range of motion  M25.611 719.51   4. Weakness of right arm  R29.898 729.89         Subjective Evaluation    History of Present Illness    Subjective comment: My shoulder is feeling better.  Every day there is improvement.  Now that I am [not in the sling anymore] and it is feeling better I can use it more for [ADLs] like showering and putting lotion on my other arm.       Objective   See Exercise, Manual, and Modality Logs for complete treatment.   *Initiated wall walk for shoulder flexion but very difficult for patient    Assessment & Plan       Assessment  Assessment details: Patient demonstrates good improvements in (R) shoulder PROM flexion and ER.  Her reportedly improving pain level is allowing notably reduced muscular guarding and allowing improved PROM.  AROM of (R) shoulder remains quite limited however and attempted progression to include wall walk was quite difficult with patient requiring external assistance of (L) UE and even then achieving minimal ROM.  She exhibits improved AAROM arcs with shoulder flexion with pulleys and walkback stretches at bar.  She responds positively to modality application following exercise.           Progress per Plan of Care         Timed:  Manual Therapy:    12     mins  62087;  Therapeutic Exercise:    26     mins  80427;     Neuromuscular Doreen:        mins  50309;    Therapeutic Activity:     10     mins  32571;     Gait Training:           mins  57817;     Ultrasound:          mins   06377;    Untimed:  Electrical Stimulation:    15     mins  76169 ( );  Mechanical Traction:         mins  14826;   Dry Needling              ___  mins   20561    Timed Treatment:   48   mins   Total Treatment:     63   mins    Louise Toscano PT, DPT, [facility]  Physical Therapist  [place] License #[id number]  Electronically signed by: Louise Toscano PT, 04/30/24, 8:53 AM EDT

## 2024-05-02 ENCOUNTER — TREATMENT (OUTPATIENT)
Dept: PHYSICAL THERAPY | Facility: CLINIC | Age: 59
End: 2024-05-02
Payer: COMMERCIAL

## 2024-05-02 DIAGNOSIS — M25.511 RIGHT SHOULDER PAIN, UNSPECIFIED CHRONICITY: ICD-10-CM

## 2024-05-02 DIAGNOSIS — M25.611 DECREASED RIGHT SHOULDER RANGE OF MOTION: ICD-10-CM

## 2024-05-02 DIAGNOSIS — Z98.890 STATUS POST ROTATOR CUFF REPAIR: Primary | ICD-10-CM

## 2024-05-02 DIAGNOSIS — R29.898 WEAKNESS OF RIGHT ARM: ICD-10-CM

## 2024-05-02 NOTE — PROGRESS NOTES
Physical Therapy Daily Treatment Note      [address], [address], [place] [id number]    Patient: Colette Whitlock   : 1965  Referring practitioner: Makenzie Barnett MD  Date of Initial Visit: Type: THERAPY  Noted: 3/27/2024  Today's Date: 2024  Patient seen for 12 sessions         Visit Diagnoses:     ICD-10-CM ICD-9-CM   1. Status post rotator cuff repair  Z98.890 V45.89   2. Right shoulder pain, unspecified chronicity  M25.511 719.41   3. Decreased right shoulder range of motion  M25.611 719.51   4. Weakness of right arm  R29.898 729.89         Subjective Evaluation    History of Present Illness    Subjective comment: My shoulder is feeling good.  I am happy that I can use it more at home.  If my  chops ingredients I can cook.  I worked on the [wall walk exercise] and it's very difficult but I can go up a little higher. Every day it gets a little better.       Objective   See Exercise, Manual, and Modality Logs for complete treatment.   *Initiated shoulder flex and abd AAROM with cane (standing) and submax shoulder isometrics    Assessment & Plan       Assessment  Assessment details: Patient continue to report gradual but steady improvement in (R) shoulder symptoms and function.  She is able to slightly increase use of her (R) UE for ADLs such as cooking, cleaning and self care tasks but continues to remain moderately limited.  (R) shoulder PROM is steadily improving but patient does benefit from intermittent verbal and tactile cues to decrease muscle guarding.  AAROM activities are progressed in standing today and submaximal shoulder isometrics are initiated to improve strength and stabilization of (R) shoulder girdle complex.           Progress per Plan of Care         Timed:  Manual Therapy:    14     mins  08052;  Therapeutic Exercise:    23     mins  68494;     Neuromuscular Doreen:        mins  06060;    Therapeutic Activity:     10     mins  89331;     Gait Training:            mins  58700;     Ultrasound:          mins  30940;    Untimed:  Electrical Stimulation:    15     mins  27222 ( );  Mechanical Traction:         mins  03879;   Dry Needling              ___  mins   20561    Timed Treatment:   47   mins   Total Treatment:     62   mins    Louise Toscano PT, DPT, OCS  Physical Therapist  [place] License #[id number]  Electronically signed by: Louise Toscano PT, 05/02/24, 9:22 AM EDT

## 2024-05-07 ENCOUNTER — TREATMENT (OUTPATIENT)
Dept: PHYSICAL THERAPY | Facility: CLINIC | Age: 59
End: 2024-05-07
Payer: COMMERCIAL

## 2024-05-07 DIAGNOSIS — Z98.890 STATUS POST ROTATOR CUFF REPAIR: Primary | ICD-10-CM

## 2024-05-07 DIAGNOSIS — M25.511 RIGHT SHOULDER PAIN, UNSPECIFIED CHRONICITY: ICD-10-CM

## 2024-05-07 DIAGNOSIS — M75.101 TEAR OF RIGHT ROTATOR CUFF, UNSPECIFIED TEAR EXTENT, UNSPECIFIED WHETHER TRAUMATIC: ICD-10-CM

## 2024-05-07 DIAGNOSIS — M25.611 DECREASED RIGHT SHOULDER RANGE OF MOTION: ICD-10-CM

## 2024-05-07 DIAGNOSIS — R29.898 WEAKNESS OF RIGHT ARM: ICD-10-CM

## 2024-05-09 ENCOUNTER — TREATMENT (OUTPATIENT)
Dept: PHYSICAL THERAPY | Facility: CLINIC | Age: 59
End: 2024-05-09
Payer: COMMERCIAL

## 2024-05-09 DIAGNOSIS — Z98.890 STATUS POST ROTATOR CUFF REPAIR: Primary | ICD-10-CM

## 2024-05-09 DIAGNOSIS — R29.898 WEAKNESS OF RIGHT ARM: ICD-10-CM

## 2024-05-09 DIAGNOSIS — M25.511 RIGHT SHOULDER PAIN, UNSPECIFIED CHRONICITY: ICD-10-CM

## 2024-05-09 DIAGNOSIS — M25.611 DECREASED RIGHT SHOULDER RANGE OF MOTION: ICD-10-CM

## 2024-05-14 ENCOUNTER — TREATMENT (OUTPATIENT)
Dept: PHYSICAL THERAPY | Facility: CLINIC | Age: 59
End: 2024-05-14
Payer: COMMERCIAL

## 2024-05-14 DIAGNOSIS — M75.101 TEAR OF RIGHT ROTATOR CUFF, UNSPECIFIED TEAR EXTENT, UNSPECIFIED WHETHER TRAUMATIC: ICD-10-CM

## 2024-05-14 DIAGNOSIS — M25.611 DECREASED RIGHT SHOULDER RANGE OF MOTION: ICD-10-CM

## 2024-05-14 DIAGNOSIS — R29.898 WEAKNESS OF RIGHT ARM: ICD-10-CM

## 2024-05-14 DIAGNOSIS — Z98.890 STATUS POST ROTATOR CUFF REPAIR: Primary | ICD-10-CM

## 2024-05-14 DIAGNOSIS — M25.511 RIGHT SHOULDER PAIN, UNSPECIFIED CHRONICITY: ICD-10-CM

## 2024-05-14 PROCEDURE — 97530 THERAPEUTIC ACTIVITIES: CPT | Performed by: PHYSICAL THERAPIST

## 2024-05-14 PROCEDURE — 97140 MANUAL THERAPY 1/> REGIONS: CPT | Performed by: PHYSICAL THERAPIST

## 2024-05-14 PROCEDURE — 97110 THERAPEUTIC EXERCISES: CPT | Performed by: PHYSICAL THERAPIST

## 2024-05-14 NOTE — PROGRESS NOTES
"Physical Therapy Daily Treatment Note               [address]                                                                                                                                             [place] [address]    Patient: Colette Whitlock   : 1965  Referring practitioner: Makenzie Barnett MD  Date of Initial Visit: Type: THERAPY  Noted: 3/27/2024  Today's Date: 2024  Patient seen for 15 sessions       Visit Diagnoses:    ICD-10-CM ICD-9-CM   1. Status post rotator cuff repair  Z98.890 V45.89   2. Right shoulder pain, unspecified chronicity  M25.511 719.41   3. Decreased right shoulder range of motion  M25.611 719.51   4. Weakness of right arm  R29.898 729.89   5. Tear of right rotator cuff, unspecified tear extent, unspecified whether traumatic  M75.101 840.4       Subjective Evaluation    History of Present Illness    Subjective comment: Pt reports that her (R) shoulder is \"better\" today.  She is still unable to use (R) UE to wash (L) side of body.       Objective   See Exercise, Manual, and Modality Logs for complete treatment.   Added s/l ER, shoulder 3-way AROM, supine alphabet     Assessment & Plan       Assessment  Assessment details: Pt completed treatment with no c/o pain in (R) shoulder.  Pt tolerated addition of AROM shoulder strengthening exercises with no issues.  Pt's HEP was updated and given to her.  Plan to progress per POC.          Timed:         Manual Therapy:    8     mins  63017;     Therapeutic Exercise:    33     mins  68685;     Neuromuscular Doreen:    0    mins  69357;    Therapeutic Activity:     10     mins  79679;     Gait Trainin     mins  03157;     Ultrasound:     0     mins  04645;    E Stim                            0    mins   83727( g0283)  Work Dougherty/Cond      0    mins   45160        Timed Treatment:   51   mins   Total Treatment:     51   mins    Joni Michaels PTA  [place] License: [id number]  "

## 2024-05-16 ENCOUNTER — TREATMENT (OUTPATIENT)
Dept: PHYSICAL THERAPY | Facility: CLINIC | Age: 59
End: 2024-05-16
Payer: COMMERCIAL

## 2024-05-16 DIAGNOSIS — Z98.890 STATUS POST ROTATOR CUFF REPAIR: Primary | ICD-10-CM

## 2024-05-16 DIAGNOSIS — M25.511 RIGHT SHOULDER PAIN, UNSPECIFIED CHRONICITY: ICD-10-CM

## 2024-05-16 DIAGNOSIS — M25.611 DECREASED RIGHT SHOULDER RANGE OF MOTION: ICD-10-CM

## 2024-05-16 DIAGNOSIS — R29.898 WEAKNESS OF RIGHT ARM: ICD-10-CM

## 2024-05-16 NOTE — PROGRESS NOTES
Physical Therapy Daily Treatment Note      [address], [address], [place] [address]    Patient: Colette Whitlock   : 1965  Referring practitioner: Makenzie Barnett MD  Date of Initial Visit: Type: THERAPY  Noted: 3/27/2024  Today's Date: 2024  Patient seen for 16 sessions         Visit Diagnoses:     ICD-10-CM ICD-9-CM   1. Status post rotator cuff repair  Z98.890 V45.89   2. Right shoulder pain, unspecified chronicity  M25.511 719.41   3. Decreased right shoulder range of motion  M25.611 719.51   4. Weakness of right arm  R29.898 729.89         Subjective Evaluation    History of Present Illness    Subjective comment: My shoulder is doing so much better.  I can start to reach out and behind me a little bit to grab something, and I can reach across to wash my L arm a little better.       Objective   See Exercise, Manual, and Modality Logs for complete treatment.   *Initiated supine shoulder flexion AROM    Assessment & Plan       Assessment  Assessment details: Patient exhibits steady improvements in (R) shoulder PROM, AAROM, and AROM all planes.  There is mild persistent soft tissue restriction in plane of ER.  Her ability to actively elevate (R) UE is improving but remains compensated with (L) trunk lean and moderate upper trap compensation despite verbal and tactile cueing.  AROM activities are progressed in supine for increased muscular recruitment and strength.          Progress per Plan of Care - progress AROM activities.          Timed:  Manual Therapy:    14     mins  20751;  Therapeutic Exercise:    29     mins  16659;     Neuromuscular Doreen:        mins  64397;    Therapeutic Activity:     10     mins  36748;     Gait Training:           mins  17025;     Ultrasound:          mins  49100;    Untimed:  Electrical Stimulation:    15     mins  65173 ( );  Mechanical Traction:         mins  46668;   Dry Needling              ___  mins   82795    Timed Treatment:   53   mins    Total Treatment:     68   mins    Louise Toscano PT, DPT, OCS  Physical Therapist  [place] License #[id number]  Electronically signed by: Louise Toscano PT, 05/16/24, 9:51 AM EDT

## 2024-05-21 ENCOUNTER — TREATMENT (OUTPATIENT)
Dept: PHYSICAL THERAPY | Facility: CLINIC | Age: 59
End: 2024-05-21
Payer: COMMERCIAL

## 2024-05-21 DIAGNOSIS — M75.101 TEAR OF RIGHT ROTATOR CUFF, UNSPECIFIED TEAR EXTENT, UNSPECIFIED WHETHER TRAUMATIC: ICD-10-CM

## 2024-05-21 DIAGNOSIS — M25.611 DECREASED RIGHT SHOULDER RANGE OF MOTION: ICD-10-CM

## 2024-05-21 DIAGNOSIS — Z98.890 STATUS POST ROTATOR CUFF REPAIR: Primary | ICD-10-CM

## 2024-05-21 DIAGNOSIS — M25.511 RIGHT SHOULDER PAIN, UNSPECIFIED CHRONICITY: ICD-10-CM

## 2024-05-21 DIAGNOSIS — R29.898 WEAKNESS OF RIGHT ARM: ICD-10-CM

## 2024-05-21 PROCEDURE — 97014 ELECTRIC STIMULATION THERAPY: CPT | Performed by: PHYSICAL THERAPIST

## 2024-05-21 PROCEDURE — 97140 MANUAL THERAPY 1/> REGIONS: CPT | Performed by: PHYSICAL THERAPIST

## 2024-05-21 PROCEDURE — 97530 THERAPEUTIC ACTIVITIES: CPT | Performed by: PHYSICAL THERAPIST

## 2024-05-21 PROCEDURE — 97110 THERAPEUTIC EXERCISES: CPT | Performed by: PHYSICAL THERAPIST

## 2024-05-21 NOTE — PROGRESS NOTES
Physical Therapy Daily Treatment Note               [address]                                                                                                                                             [place] [address]    Patient: Colette Whitlock   : 1965  Referring practitioner: Makenzie Barnett MD  Date of Initial Visit: Type: THERAPY  Noted: 3/27/2024  Today's Date: 2024  Patient seen for 17 sessions       Visit Diagnoses:    ICD-10-CM ICD-9-CM   1. Status post rotator cuff repair  Z98.890 V45.89   2. Right shoulder pain, unspecified chronicity  M25.511 719.41   3. Decreased right shoulder range of motion  M25.611 719.51   4. Weakness of right arm  R29.898 729.89   5. Tear of right rotator cuff, unspecified tear extent, unspecified whether traumatic  M75.101 840.4       Subjective Evaluation    History of Present Illness    Subjective comment: Pt reports that she was makng a salad on Friday. She started having pain in posterior (R) shoulder/UT.  It spread to the anterior shoulder. Pt took some of her pain med'd and wore her sling over the week end.  The pain has subsided now.       Objective   See Exercise, Manual, and Modality Logs for complete treatment.       Assessment & Plan       Assessment  Assessment details: Pt completed treatment with minimal c/o increased pain in (R) shoulder. Pt continues to compensate with body lean during standing shoulder flexion.  (L) shoulder strength continue to show improvements.  Plan to continue to progress per POC.          Timed:         Manual Therapy:    10     mins  12320;     Therapeutic Exercise:    30     mins  42004;     Neuromuscular Doreen:    0    mins  85681;    Therapeutic Activity:     15     mins  24023;     Gait Trainin     mins  43293;     Ultrasound:     0     mins  58287;    E Stim                            15   mins   79516( g0283)  Work Dougherty/Cond      0    mins   90263        Timed Treatment:   55    mins   Total Treatment:     70   mins    Joni Michaels, PTA  [place] License: [id number]

## 2024-05-23 ENCOUNTER — TREATMENT (OUTPATIENT)
Dept: PHYSICAL THERAPY | Facility: CLINIC | Age: 59
End: 2024-05-23
Payer: COMMERCIAL

## 2024-05-23 DIAGNOSIS — Z98.890 STATUS POST ROTATOR CUFF REPAIR: Primary | ICD-10-CM

## 2024-05-23 DIAGNOSIS — R29.898 WEAKNESS OF RIGHT ARM: ICD-10-CM

## 2024-05-23 DIAGNOSIS — M25.611 DECREASED RIGHT SHOULDER RANGE OF MOTION: ICD-10-CM

## 2024-05-23 DIAGNOSIS — M25.511 RIGHT SHOULDER PAIN, UNSPECIFIED CHRONICITY: ICD-10-CM

## 2024-05-23 NOTE — PROGRESS NOTES
Re-Assessment / Re-Certification  [facility] Therapy  [address], [address], [place] [id number]    Patient: Colette Whitlock   : 1965  Diagnosis/ICD-10 Code:  Status post rotator cuff repair [Z98.890]  Referring practitioner: Makenzie Barnett MD  Date of Initial Visit: 2024  Today's Date: 2024  Patient seen for 18 sessions      Subjective:   Subjective Questionnaire: QuickDASH: 59.09%  Clinical Progress: improved  Home Program Compliance: Yes  Treatment has included: therapeutic exercise, neuromuscular re-education, manual therapy, therapeutic activity, electrical stimulation, and cryotherapy    Subjective Evaluation    History of Present Illness    Subjective comment: Shoulder is doing much better.  I can shower by myself but have trouble reaching behind my [L] arm, reaching behind my head, and reaching behind my back.  I can reach better [overhead] but still need a little more [ROM] with that.  The pain is much better and mainly just bothers me if I do something more at home like mopping and then it starts to hurt a little. I can sweep, wash dishes, and cook.     Objective          Active Range of Motion     Additional Active Range of Motion Details  AROM (R) shoulder supine 142 deg    Passive Range of Motion     Right Shoulder   Flexion: 161 degrees with pain  Abduction: 152 degrees with pain  External rotation 90°: 68 degrees with pain  Internal rotation 90°: 43 degrees with pain      Assessment & Plan       Assessment  Assessment details: Patient remains motivated and compliant with PT interventions. Her (R) shoulder symptoms and function are gradually but steadily improving.  She demonstrates improving (R) shoulder PROM all planes with mild limitations in flexion and abduction but moderate limitations in ER and IR.  Her (R) shoulder AROM is moderately limited with compensation pattern against gravity.  (R) UE strength is functionally limited.  Her QuickDASH score has  improved from 79% to 59% indicating a significant perceived level of functional improvement within the past month.  She will benefit to continue skilled PT services to address remaining functional deficits and optimize (R) shoulder post-operative functional recovery.    Goals  Plan Goals: STG's to be met by 3 weeks  1)  Independent with HEP - MET  2)  Decrease pain by 50% or more to allow for patient to perform activities during the day - MET  3)  PROM right shoulder flexion to 145 or more to allow for AROM when she gets to this point, which will help with fixing her hair and reaching into cabinets - MET  4)  PROM right shoulder abd to 145 or more to allow for AROM when she gets to this point, which will allow for reaching out to the side - MET  5)  Patient to sleep up to 3 hours before the shoulder pain wakes her up - MET (4 HOURS) IN CHAIR/NOT IN BED     LTG's to be met by 6 weeks  1)  Independent with HEP progression - NOT MET  2)  Decrease pain by 75% or more to allow for patient to perform activities during the day - NOT MET  3)  PROM right shoulder flexion and abd to 165 or more to allow for return to full AROM - NOT MET  4)  PROM right shoulder ER to 60 - NOT MET  5)  PROM right shoulder IR to the stomach to allow for return to functional IR - NOT MET/IMPROVED  6)  Patient to have no increase in pain once released from wearing the sling - MET  7)  Patient to sleep up to 5 hours without shoulder pain - NOT MET        Progress toward previous goals: Partially Met  Recommendations: Continue as planned  Timeframe: 6 weeks  Prognosis to achieve goals: good    PT Signature: Louise Toscano, PT, DPT, OCS  [place] License # [id number]      Based upon review of the patient's progress and continued therapy plan, it is my medical opinion that Colette Whitlock should continue physical therapy treatment at [facility]ERAP[facility]  [address] [place] [address]  [address]  [facility]  [address]  [phone].    Signature: __________________________________  Makenzie Barnett MD    Manual Therapy:    13     mins  47361;  Therapeutic Exercise:    32     mins  24960;     Neuromuscular Doreen:        mins  62183;    Therapeutic Activity:     15     mins  09477;     Gait Training:           mins  24948;     Ultrasound:          mins  82665;    Electrical Stimulation:    15     mins  54805 ( );  Dry Needling          mins self-pay    Timed Treatment:   50   mins   Total Treatment:     72   mins    Please sign and return via fax to [phone]. Thank you, [facility].

## 2024-05-28 ENCOUNTER — TREATMENT (OUTPATIENT)
Dept: PHYSICAL THERAPY | Facility: CLINIC | Age: 59
End: 2024-05-28
Payer: COMMERCIAL

## 2024-05-28 DIAGNOSIS — M25.511 RIGHT SHOULDER PAIN, UNSPECIFIED CHRONICITY: ICD-10-CM

## 2024-05-28 DIAGNOSIS — M75.101 TEAR OF RIGHT ROTATOR CUFF, UNSPECIFIED TEAR EXTENT, UNSPECIFIED WHETHER TRAUMATIC: ICD-10-CM

## 2024-05-28 DIAGNOSIS — Z98.890 STATUS POST ROTATOR CUFF REPAIR: Primary | ICD-10-CM

## 2024-05-28 DIAGNOSIS — R29.898 WEAKNESS OF RIGHT ARM: ICD-10-CM

## 2024-05-28 DIAGNOSIS — M25.611 DECREASED RIGHT SHOULDER RANGE OF MOTION: ICD-10-CM

## 2024-05-28 PROCEDURE — 97530 THERAPEUTIC ACTIVITIES: CPT | Performed by: PHYSICAL THERAPIST

## 2024-05-28 PROCEDURE — 97110 THERAPEUTIC EXERCISES: CPT | Performed by: PHYSICAL THERAPIST

## 2024-05-28 PROCEDURE — 97140 MANUAL THERAPY 1/> REGIONS: CPT | Performed by: PHYSICAL THERAPIST

## 2024-05-28 NOTE — PROGRESS NOTES
Physical Therapy Daily Treatment Note               [address]                                                                                                                                             [place] [address]    Patient: Colette Whitlock   : 1965  Referring practitioner: Makenzie Barnett MD  Date of Initial Visit: Type: THERAPY  Noted: 3/27/2024  Today's Date: 2024  Patient seen for 19 sessions       Visit Diagnoses:    ICD-10-CM ICD-9-CM   1. Status post rotator cuff repair  Z98.890 V45.89   2. Right shoulder pain, unspecified chronicity  M25.511 719.41   3. Decreased right shoulder range of motion  M25.611 719.51   4. Weakness of right arm  R29.898 729.89   5. Tear of right rotator cuff, unspecified tear extent, unspecified whether traumatic  M75.101 840.4       Subjective Evaluation    History of Present Illness    Subjective comment: Pt reports that pain in anterior is better, but still has pain in posterior shoulder with kitchen tasks.       Objective   See Exercise, Manual, and Modality Logs for complete treatment.       Assessment & Plan       Assessment  Assessment details: Pt completed treatment with no c/o increased shoulder pain.  Pt continues to be motivated to increase her (R) shoulder ROM for a full RTW.  Pt benefits from vc to avoid compensation patterns when performing shoulder flexion/scaption and abductions. Plan to progress per POC.          Timed:         Manual Therapy:    12     mins  79532;     Therapeutic Exercise:    28     mins  30874;     Neuromuscular Doreen:    0    mins  29608;    Therapeutic Activity:     13     mins  09114;     Gait Trainin     mins  21943;     Ultrasound:     0     mins  99619;    E Stim                            0    mins   34575( g0283)  Work Dougherty/Cond      0    mins   67619        Timed Treatment:   53   mins   Total Treatment:     63   mins    Joni Michaels PTA  [place] License: [id number]

## 2024-05-30 ENCOUNTER — TREATMENT (OUTPATIENT)
Dept: PHYSICAL THERAPY | Facility: CLINIC | Age: 59
End: 2024-05-30
Payer: COMMERCIAL

## 2024-05-30 DIAGNOSIS — M25.511 RIGHT SHOULDER PAIN, UNSPECIFIED CHRONICITY: ICD-10-CM

## 2024-05-30 DIAGNOSIS — M25.611 DECREASED RIGHT SHOULDER RANGE OF MOTION: ICD-10-CM

## 2024-05-30 DIAGNOSIS — M75.101 TEAR OF RIGHT ROTATOR CUFF, UNSPECIFIED TEAR EXTENT, UNSPECIFIED WHETHER TRAUMATIC: ICD-10-CM

## 2024-05-30 DIAGNOSIS — Z98.890 STATUS POST ROTATOR CUFF REPAIR: Primary | ICD-10-CM

## 2024-05-30 DIAGNOSIS — R29.898 WEAKNESS OF RIGHT ARM: ICD-10-CM

## 2024-05-30 NOTE — PROGRESS NOTES
Physical Therapy Daily Treatment Note               [address]                                                                                                                                             [place] [address]    Patient: Colette Whitlock   : 1965  Referring practitioner: Makenzie Barnett MD  Date of Initial Visit: Type: THERAPY  Noted: 3/27/2024  Today's Date: 2024  Patient seen for 20 sessions       Visit Diagnoses:    ICD-10-CM ICD-9-CM   1. Status post rotator cuff repair  Z98.890 V45.89   2. Right shoulder pain, unspecified chronicity  M25.511 719.41   3. Decreased right shoulder range of motion  M25.611 719.51   4. Weakness of right arm  R29.898 729.89   5. Tear of right rotator cuff, unspecified tear extent, unspecified whether traumatic  M75.101 840.4       Subjective Evaluation    History of Present Illness    Subjective comment: Pt denies any shoulder pain today.       Objective   See Exercise, Manual, and Modality Logs for complete treatment.       Assessment & Plan       Assessment  Assessment details: Pt completed treatment with no c/o pain in (R) shoulder.  Pt benefits from vc for exercise performance with shoulder isometrics.  Pt remains motivated to strengthen her (R) shoulder for a full RTW.  Continue to progress per POC.          Timed:         Manual Therapy:    8     mins  61320;     Therapeutic Exercise:    30     mins  90835;     Neuromuscular Doreen:    0    mins  02036;    Therapeutic Activity:     16     mins  61831;     Gait Trainin     mins  31411;     Ultrasound:     0     mins  00898;    E Stim                            0    mins   91351( g0283)  Work Dougherty/Cond      0    mins   43022        Timed Treatment:   54   mins   Total Treatment:     64   mins    Joni Michaels PTA  [place] License: [id number]

## 2024-05-31 NOTE — PROGRESS NOTES
Shoulder Scope RCR follow Up       Patient: Colette Whitlock        YOB: 1965      Chief Complaints: shoulder painRight      History of Present Illness: Pt is here f/u shoulder arthroscopy, RCR  On the right she is doing much better states he feels better her motion is getting better she does admit to doing a little too much as far as lifting we did reiterate her restrictions her daughter is with her today again      Allergies:   Allergies   Allergen Reactions    Penicillin G Unknown (See Comments)     Told this as a child       Medications:   Home Medications:  Current Outpatient Medications on File Prior to Visit   Medication Sig    meloxicam (MOBIC) 15 MG tablet TAKE 1 TABLET BY MOUTH DAILY FOR 60 DAYS.    acetaminophen (TYLENOL) 500 MG tablet Take 1 tablet by mouth Every 6 (Six) Hours As Needed for Mild Pain.    amoxicillin (AMOXIL) 500 MG capsule Take 2 capsules by mouth 2 (Two) Times a Day. (Patient not taking: Reported on 4/22/2024)    Diclofenac Sodium (Voltaren Arthritis Pain) 1 % gel gel Apply 4 g topically to the appropriate area as directed 2 (Two) Times a Day. (Patient not taking: Reported on 4/22/2024)    ketotifen (ZADITOR) 0.025 % ophthalmic solution Apply 1 drop to eye(s) as directed by provider Every 12 (Twelve) Hours As Needed.    montelukast (SINGULAIR) 10 MG tablet Take 1 tablet by mouth every night at bedtime. (Patient not taking: Reported on 4/22/2024)    oxyCODONE-acetaminophen (PERCOCET) 5-325 MG per tablet Take 1-2 tablets by mouth Every 4 (Four) Hours As Needed for Moderate Pain (1 tablet for moderate pain or 2 tablets for severe pain).    oxyCODONE-acetaminophen (PERCOCET) 5-325 MG per tablet Take 1 tablet by mouth Every 4 (Four) Hours As Needed for Severe Pain. (Patient not taking: Reported on 4/22/2024)    psyllium (Metamucil Smooth Texture) 58.6 % powder 1 large tablespoon mixed in 12-15 ounces of water every morning. (Patient not taking: Reported on 4/22/2024)     No  current facility-administered medications on file prior to visit.     Current Medications:  Scheduled Meds:  Continuous Infusions:No current facility-administered medications for this visit.    PRN Meds:.          Physical Exam: 59 y.o. female  General Appearance:    Alert, cooperative, in no acute distress                 There were no vitals filed for this visit.   Patient is alert and oriented ×3 no acute distress normal mood physical exam.  Physical exam of the shoulder, incisions looked good there is no erythema,no signs or sx of infection.  Motion is much better passively I can get her to about 165 external rotation to 40 rotator cuff strength 4+/5    Assessment  S/P shoulder scope, rotator cuff repair, I think she is doing outstanding I want her to continue work on passive range of motion by no restrictions I will see her back in 6 weeks I will keep her off work

## 2024-06-03 ENCOUNTER — OFFICE VISIT (OUTPATIENT)
Dept: ORTHOPEDIC SURGERY | Facility: CLINIC | Age: 59
End: 2024-06-03
Payer: COMMERCIAL

## 2024-06-03 VITALS — WEIGHT: 264.7 LBS | TEMPERATURE: 97.8 F | HEIGHT: 62 IN | BODY MASS INDEX: 48.71 KG/M2

## 2024-06-03 DIAGNOSIS — Z98.890 S/P ROTATOR CUFF REPAIR: Primary | ICD-10-CM

## 2024-06-03 PROCEDURE — 99024 POSTOP FOLLOW-UP VISIT: CPT | Performed by: ORTHOPAEDIC SURGERY

## 2024-06-03 NOTE — LETTER
Nelli 3, 2024     Patient: Colette Whitlock   YOB: 1965   Date of Visit: 6/3/2024       To Whom It May Concern:    It is my medical opinion that Colette Whitlock will remain off work at this time.  It will realistically be around 4 months at the earliest before she can return to her previous level of employment without restrictions.           Sincerely,        Makenzie Barnett MD    CC:   No Recipients

## 2024-06-04 ENCOUNTER — TREATMENT (OUTPATIENT)
Dept: PHYSICAL THERAPY | Facility: CLINIC | Age: 59
End: 2024-06-04
Payer: COMMERCIAL

## 2024-06-04 DIAGNOSIS — M25.611 DECREASED RIGHT SHOULDER RANGE OF MOTION: ICD-10-CM

## 2024-06-04 DIAGNOSIS — Z98.890 STATUS POST ROTATOR CUFF REPAIR: Primary | ICD-10-CM

## 2024-06-04 DIAGNOSIS — M25.511 RIGHT SHOULDER PAIN, UNSPECIFIED CHRONICITY: ICD-10-CM

## 2024-06-04 DIAGNOSIS — R29.898 WEAKNESS OF RIGHT ARM: ICD-10-CM

## 2024-06-04 PROCEDURE — 97110 THERAPEUTIC EXERCISES: CPT | Performed by: PHYSICAL THERAPIST

## 2024-06-04 PROCEDURE — 97530 THERAPEUTIC ACTIVITIES: CPT | Performed by: PHYSICAL THERAPIST

## 2024-06-04 PROCEDURE — 97140 MANUAL THERAPY 1/> REGIONS: CPT | Performed by: PHYSICAL THERAPIST

## 2024-06-04 NOTE — PROGRESS NOTES
Physical Therapy Daily Treatment Note      [address], [address], [place] [address]    Patient: Colette Whitlock   : 1965  Referring practitioner: No ref. provider found  Date of Initial Visit: Type: THERAPY  Noted: 3/27/2024  Today's Date: 2024  Patient seen for 21 sessions         Visit Diagnoses:     ICD-10-CM ICD-9-CM   1. Status post rotator cuff repair  Z98.890 V45.89   2. Right shoulder pain, unspecified chronicity  M25.511 719.41   3. Decreased right shoulder range of motion  M25.611 719.51   4. Weakness of right arm  R29.898 729.89         Subjective     Objective   See Exercise, Manual, and Modality Logs for complete treatment.       Assessment/Plan      {AMB PT PLAN (SOAP Note):65892}         Timed:  Manual Therapy:    ***     mins  10912;  Therapeutic Exercise:    ***     mins  23182;     Neuromuscular Doreen:    ***    mins  42934;    Therapeutic Activity:     ***     mins  70966;     Gait Training:      ***     mins  70491;     Ultrasound:     ***     mins  76583;    Untimed:  Electrical Stimulation:    ***     mins  66769 ( );  Mechanical Traction:    ***     mins  68980;   Dry Needling              __***_  mins   19825    Timed Treatment:   ***   mins   Total Treatment:     ***   mins    Louise Toscano PT, DPT, OCS  Physical Therapist  [place] License #[id number]  Electronically signed by: Louise Toscano PT, 24, 10:32 AM EDT

## 2024-06-06 ENCOUNTER — TREATMENT (OUTPATIENT)
Dept: PHYSICAL THERAPY | Facility: CLINIC | Age: 59
End: 2024-06-06
Payer: COMMERCIAL

## 2024-06-06 DIAGNOSIS — M75.101 TEAR OF RIGHT ROTATOR CUFF, UNSPECIFIED TEAR EXTENT, UNSPECIFIED WHETHER TRAUMATIC: ICD-10-CM

## 2024-06-06 DIAGNOSIS — R29.898 WEAKNESS OF RIGHT ARM: ICD-10-CM

## 2024-06-06 DIAGNOSIS — Z98.890 STATUS POST ROTATOR CUFF REPAIR: Primary | ICD-10-CM

## 2024-06-06 DIAGNOSIS — M25.611 DECREASED RIGHT SHOULDER RANGE OF MOTION: ICD-10-CM

## 2024-06-06 DIAGNOSIS — M25.511 RIGHT SHOULDER PAIN, UNSPECIFIED CHRONICITY: ICD-10-CM

## 2024-06-06 NOTE — PROGRESS NOTES
Physical Therapy Daily Treatment Note               [address] [facility]                                                                                                                                             [place] [address]    Patient: Colette Whitlock   : 1965  Referring practitioner: No ref. provider found  Date of Initial Visit: Type: THERAPY  Noted: 3/27/2024  Today's Date: 2024  Patient seen for 22 sessions       Visit Diagnoses:    ICD-10-CM ICD-9-CM   1. Status post rotator cuff repair  Z98.890 V45.89   2. Right shoulder pain, unspecified chronicity  M25.511 719.41   3. Decreased right shoulder range of motion  M25.611 719.51   4. Weakness of right arm  R29.898 729.89   5. Tear of right rotator cuff, unspecified tear extent, unspecified whether traumatic  M75.101 840.4       Subjective Evaluation    History of Present Illness    Subjective comment: Pt denies any pain in (R) shoulder.  She has caught up on her rest from the weekend.       Objective   See Exercise, Manual, and Modality Logs for complete treatment.       Assessment & Plan       Assessment  Assessment details: Pt completed treatment with no c/o pain in (R) shoulder.  Pt needs minimal vc for exercises performance. She remains motivated to improve her (L) shoulder ROM and strength. Plan to progress per POC.          Timed:         Manual Therapy:    12     mins  48300;     Therapeutic Exercise:    27     mins  06867;     Neuromuscular Doreen:    0    mins  32649;    Therapeutic Activity:     10     mins  10656;     Gait Trainin     mins  06525;     Ultrasound:     0     mins  38926;    E Stim                            0    mins   26447( g0283)  Work Dougherty/Cond      0    mins   93740        Timed Treatment:   49   mins   Total Treatment:     59   mins    Joni Michaels PTA  [place] License: [id number]

## 2024-06-11 ENCOUNTER — TREATMENT (OUTPATIENT)
Dept: PHYSICAL THERAPY | Facility: CLINIC | Age: 59
End: 2024-06-11
Payer: COMMERCIAL

## 2024-06-11 DIAGNOSIS — R29.898 WEAKNESS OF RIGHT ARM: ICD-10-CM

## 2024-06-11 DIAGNOSIS — Z98.890 STATUS POST ROTATOR CUFF REPAIR: Primary | ICD-10-CM

## 2024-06-11 DIAGNOSIS — M25.511 RIGHT SHOULDER PAIN, UNSPECIFIED CHRONICITY: ICD-10-CM

## 2024-06-11 DIAGNOSIS — M25.611 DECREASED RIGHT SHOULDER RANGE OF MOTION: ICD-10-CM

## 2024-06-11 DIAGNOSIS — M75.101 TEAR OF RIGHT ROTATOR CUFF, UNSPECIFIED TEAR EXTENT, UNSPECIFIED WHETHER TRAUMATIC: ICD-10-CM

## 2024-06-11 NOTE — PROGRESS NOTES
Physical Therapy Daily Treatment Note      Patient: Colette Whitlock   : 1965  Referring practitioner: No ref. provider found  Date of Initial Visit: Type: THERAPY  Noted: 3/27/2024  Today's Date: 2024  Patient seen for 23 sessions       Visit Diagnoses:    ICD-10-CM ICD-9-CM   1. Status post rotator cuff repair  Z98.890 V45.89   2. Right shoulder pain, unspecified chronicity  M25.511 719.41   3. Decreased right shoulder range of motion  M25.611 719.51   4. Weakness of right arm  R29.898 729.89   5. Tear of right rotator cuff, unspecified tear extent, unspecified whether traumatic  M75.101 840.4       Subjective Evaluation    History of Present Illness    Subjective comment: Pt reports she can almost buckle her necklace, but cannot reach behind her back and can fix her hair but not 100%. Yesterday she swept her kitchen and felt a little bit of pain afterwards.       Objective   See Exercise, Manual, and Modality Logs for complete treatment.       Assessment & Plan       Assessment  Assessment details: Patient tolerates therapy well. Patient with no c/o of pain throughout the session. Patient continues to be challenged by AROM of (R) shoulder in elevation. Patient is making good progress toward overall goals. Patient states continual limitations with ADLs, like putting a necklace on, reach behind her back, and brushing her hair, which will continue to improve with ROM and strength. Patient demonstrates most limitations in abduction and IR of (R) shoulder. Patient will continue to benefit from skilled therapy for improvements in ROM, strength, functional mobility of (R) UE to meet goals and return to PLOF and work without restrictions.     Plan  Plan details: Continue to progress as patient tolerates. Add 1# weight to supine and sidelying exercises for improvements in ROM and strength of (R) shoulder.           Timed:         Manual Therapy:    11     mins  27234;     Therapeutic Exercise:    24     mins   11714;     Neuromuscular Doreen:        mins  39491;    Therapeutic Activity:     12     mins  38612;     Gait Training:           mins  66026;     Ultrasound:          mins  67376;    Ionto                                   mins   41252  Self Care                            mins   79771      Un-Timed:  Electrical Stimulation:         mins  03485 ( );  Dry Needling          mins self-pay  Traction          mins 80808  Canalith Repos         mins 44098    Timed Treatment:   46   mins   Total Treatment:     56   mins    FRANKY Doe License: [id number]

## 2024-06-13 ENCOUNTER — TREATMENT (OUTPATIENT)
Dept: PHYSICAL THERAPY | Facility: CLINIC | Age: 59
End: 2024-06-13
Payer: COMMERCIAL

## 2024-06-13 DIAGNOSIS — M25.511 RIGHT SHOULDER PAIN, UNSPECIFIED CHRONICITY: ICD-10-CM

## 2024-06-13 DIAGNOSIS — R29.898 WEAKNESS OF RIGHT ARM: ICD-10-CM

## 2024-06-13 DIAGNOSIS — M75.101 TEAR OF RIGHT ROTATOR CUFF, UNSPECIFIED TEAR EXTENT, UNSPECIFIED WHETHER TRAUMATIC: ICD-10-CM

## 2024-06-13 DIAGNOSIS — Z98.890 STATUS POST ROTATOR CUFF REPAIR: Primary | ICD-10-CM

## 2024-06-13 DIAGNOSIS — M25.611 DECREASED RIGHT SHOULDER RANGE OF MOTION: ICD-10-CM

## 2024-06-13 NOTE — PROGRESS NOTES
Physical Therapy Daily Treatment Note      Patient: Colette Whitlock   : 1965  Referring practitioner: No ref. provider found  Date of Initial Visit: Type: THERAPY  Noted: 3/27/2024  Today's Date: 2024  Patient seen for 24 sessions       Visit Diagnoses:    ICD-10-CM ICD-9-CM   1. Status post rotator cuff repair  Z98.890 V45.89   2. Right shoulder pain, unspecified chronicity  M25.511 719.41   3. Decreased right shoulder range of motion  M25.611 719.51   4. Weakness of right arm  R29.898 729.89   5. Tear of right rotator cuff, unspecified tear extent, unspecified whether traumatic  M75.101 840.4       Subjective Evaluation    History of Present Illness    Subjective comment: Pt reports increased pain at night when sleeping in her bed. Had to move back to sleeping in her recliner. Patient with some frustrations with the desrire to return to work.       Objective   See Exercise, Manual, and Modality Logs for complete treatment.   *Initiated wall walks in (R) shoulder abduction.     Assessment & Plan       Assessment  Assessment details: Patient tolerates therapy well. Patient with concerns about her progress in therapy. She is wanting to return to work and fearful that she might lose her job due to all the time off. Educated on healing times, surgeon protocol and protecting the involved pathoanatomy to prevent re injury. Patient states understanding and compliance with HEP to continue to progress. Initiated wall walks for (R) shoulder abduction with stretch at end range for improvements in ROM and functional mobility. Patient continues to require skilled services to return to work without restrictions or pain.      Plan  Plan details: Plan to progress as patient tolerates within established POC and per protocol. Ideas of progressions outlined in flowsheet.           Timed:         Manual Therapy:    10     mins  58473;     Therapeutic Exercise:    23     mins  25066;     Neuromuscular Doreen:    12    mins   24731;    Therapeutic Activity:          mins  67834;     Gait Training:           mins  15558;     Ultrasound:          mins  36057;    Ionto                                   mins   85874  Self Care                            mins   57705      Un-Timed:  Electrical Stimulation:         mins  90670 ( );  Dry Needling          mins self-pay  Traction          mins 87636  Canalith Repos         mins 00189    Timed Treatment:   45   mins   Total Treatment:     52   mins    FRANKY Doe License: [id number]

## 2024-06-18 ENCOUNTER — TREATMENT (OUTPATIENT)
Dept: PHYSICAL THERAPY | Facility: CLINIC | Age: 59
End: 2024-06-18
Payer: COMMERCIAL

## 2024-06-18 DIAGNOSIS — M75.101 TEAR OF RIGHT ROTATOR CUFF, UNSPECIFIED TEAR EXTENT, UNSPECIFIED WHETHER TRAUMATIC: ICD-10-CM

## 2024-06-18 DIAGNOSIS — Z98.890 STATUS POST ROTATOR CUFF REPAIR: Primary | ICD-10-CM

## 2024-06-18 DIAGNOSIS — M25.511 RIGHT SHOULDER PAIN, UNSPECIFIED CHRONICITY: ICD-10-CM

## 2024-06-18 DIAGNOSIS — M25.611 DECREASED RIGHT SHOULDER RANGE OF MOTION: ICD-10-CM

## 2024-06-18 DIAGNOSIS — R29.898 WEAKNESS OF RIGHT ARM: ICD-10-CM

## 2024-06-18 NOTE — PROGRESS NOTES
Physical Therapy Daily Treatment Note               [address]                                                                                                                                             [place] [address]    Patient: Colette Whitlock   : 1965  Referring practitioner: No ref. provider found  Date of Initial Visit: Type: THERAPY  Noted: 3/27/2024  Today's Date: 2024  Patient seen for 25 sessions       Visit Diagnoses:    ICD-10-CM ICD-9-CM   1. Status post rotator cuff repair  Z98.890 V45.89   2. Right shoulder pain, unspecified chronicity  M25.511 719.41   3. Decreased right shoulder range of motion  M25.611 719.51   4. Weakness of right arm  R29.898 729.89   5. Tear of right rotator cuff, unspecified tear extent, unspecified whether traumatic  M75.101 840.4       Subjective Evaluation    History of Present Illness    Subjective comment: Pt reports that she hand washed a t shirt. Her (R) shoulder became fatigued and painful from it.       Objective   See Exercise, Manual, and Modality Logs for complete treatment.   Added bent row, bicep curl ,    Assessment & Plan       Assessment  Assessment details: Pt completed treatment with minimal c/o pain in (R) shoulder.  She was able to progress to weighted resistance on shoulder flexion and abd exercises.  She tolerated the addition of bent rows and bicep curls with no issues.  Plan to progress per POC.          Timed:         Manual Therapy:    10     mins  31741;     Therapeutic Exercise:    30     mins  59516;     Neuromuscular Doreen:    13    mins  18408;    Therapeutic Activity:     0     mins  07738;     Gait Trainin     mins  98313;     Ultrasound:     0     mins  14674;    E Stim                            0    mins   37001( g0283)  Work Dougherty/Cond      0    mins   24591        Timed Treatment:   53   mins   Total Treatment:     53   mins    Joni Michaels PTA  [place] License: [id number]

## 2024-06-20 ENCOUNTER — TREATMENT (OUTPATIENT)
Dept: PHYSICAL THERAPY | Facility: CLINIC | Age: 59
End: 2024-06-20
Payer: COMMERCIAL

## 2024-06-20 DIAGNOSIS — M25.611 DECREASED RIGHT SHOULDER RANGE OF MOTION: ICD-10-CM

## 2024-06-20 DIAGNOSIS — Z98.890 STATUS POST ROTATOR CUFF REPAIR: Primary | ICD-10-CM

## 2024-06-20 DIAGNOSIS — R29.898 WEAKNESS OF RIGHT ARM: ICD-10-CM

## 2024-06-20 DIAGNOSIS — M75.101 TEAR OF RIGHT ROTATOR CUFF, UNSPECIFIED TEAR EXTENT, UNSPECIFIED WHETHER TRAUMATIC: ICD-10-CM

## 2024-06-20 DIAGNOSIS — M25.511 RIGHT SHOULDER PAIN, UNSPECIFIED CHRONICITY: ICD-10-CM

## 2024-06-20 NOTE — PROGRESS NOTES
Physical Therapy Daily Treatment Note               [address] [facility] [address]                                                                                                                                             [place] [address]    Patient: Colette Whitlock   : 1965  Referring practitioner: Makenzie Barnett MD  Date of Initial Visit: Type: THERAPY  Noted: 3/27/2024  Today's Date: 2024  Patient seen for 26 sessions       Visit Diagnoses:    ICD-10-CM ICD-9-CM   1. Status post rotator cuff repair  Z98.890 V45.89   2. Right shoulder pain, unspecified chronicity  M25.511 719.41   3. Decreased right shoulder range of motion  M25.611 719.51   4. Weakness of right arm  R29.898 729.89   5. Tear of right rotator cuff, unspecified tear extent, unspecified whether traumatic  M75.101 840.4       Subjective Evaluation    History of Present Illness    Subjective comment: Pt denies any shoulder pain today.       Objective   See Exercise, Manual, and Modality Logs for complete treatment.       Assessment & Plan       Assessment  Assessment details: Pt completed treatment with no c/o increased pain in (R) shoulder.  Pt tolerated addition  of TB resisted IR/ER with vc/tc for postioning and form.  She needed minimal vc for the performance of the rest of her exercises.  Plan to progress per POC.          Timed:         Manual Therapy:    10     mins  15881;     Therapeutic Exercise:    34     mins  45151;     Neuromuscular Doreen:    20    mins  39256;    Therapeutic Activity:     0     mins  19430;     Gait Trainin     mins  04658;     Ultrasound:     0     mins  05644;    E Stim                            0    mins   26609( g0283)  Work Dougherty/Cond      0    mins   06109        Timed Treatment:   64   mins   Total Treatment:     64   mins    Joni Michaels [facility] License: [id number]

## 2024-06-25 ENCOUNTER — TREATMENT (OUTPATIENT)
Dept: PHYSICAL THERAPY | Facility: CLINIC | Age: 59
End: 2024-06-25
Payer: COMMERCIAL

## 2024-06-25 DIAGNOSIS — Z98.890 STATUS POST ROTATOR CUFF REPAIR: Primary | ICD-10-CM

## 2024-06-25 DIAGNOSIS — M25.611 DECREASED RIGHT SHOULDER RANGE OF MOTION: ICD-10-CM

## 2024-06-25 DIAGNOSIS — M75.101 TEAR OF RIGHT ROTATOR CUFF, UNSPECIFIED TEAR EXTENT, UNSPECIFIED WHETHER TRAUMATIC: ICD-10-CM

## 2024-06-25 DIAGNOSIS — R29.898 WEAKNESS OF RIGHT ARM: ICD-10-CM

## 2024-06-25 DIAGNOSIS — M25.511 RIGHT SHOULDER PAIN, UNSPECIFIED CHRONICITY: ICD-10-CM

## 2024-06-25 NOTE — PROGRESS NOTES
Re-Assessment / Re-Certification    Patient: Colette Whitlock   : 1965  Diagnosis/ICD-10 Code:  Status post rotator cuff repair [Z98.890]  Referring practitioner: Makenzie Barnett MD  Date of Initial Visit: Type: THERAPY  Noted: 3/27/2024  Today's Date: 2024  Patient seen for 27 sessions      Subjective:     Subjective Questionnaire: QuickDASH: 45.45  Clinical Progress: improved  Home Program Compliance: Yes  Treatment has included: therapeutic exercise, neuromuscular re-education, manual therapy, therapeutic activity, and cryotherapy    Subjective Evaluation    History of Present Illness    Subjective comment: Pt reports she went home after last session and took her sheets off her bed and did laundry; had increased pain for a few hours after. Took some pain reliever and applied ice and felt relief. Able to sleep through the night in her recliner. Feels arm pulling down/very heavy when not supported at night laying in bed or after activities. Able to wash her hair and opposite arm and put deodorant on. Reports she is very anxious about not working and wants to return as quickly as possible without rushing it.Pain  Current pain rating: 3         Objective          Active Range of Motion     Right Shoulder   Flexion: 123 degrees   Abduction: 95 degrees     Additional Active Range of Motion Details  AROM (R) shoulder flexion supine: 145    Passive Range of Motion   Left Shoulder   Flexion: 162 degrees   Abduction: 155 degrees   External rotation 0°: 65 degrees   Internal rotation 0°: 45 degrees       Assessment & Plan       Assessment  Assessment details: Patient remains motivated and compliant with PT interventions. Her (R) shoulder symptoms and function are gradually improving, however still pain limited. She demonstrates minimal improvements in (R) shoulder PROM in all planes. Patient with improved active and active-assisted (R) shoulder ROM, with compensatory patterns lifting arm against gravity. Her  QuickDASH score has improved from 59% to 45% indicating a significant perceived level of functional improvement within the past month.  She will benefit to continue skilled PT services to address remaining functional deficits and optimize (R) shoulder post-operative functional recovery.    Goals  Plan Goals: STG's to be met by 3 weeks  1)  Independent with HEP - MET  2)  Decrease pain by 50% or more to allow for patient to perform activities during the day - MET  3)  PROM right shoulder flexion to 145 or more to allow for AROM when she gets to this point, which will help with fixing her hair and reaching into cabinets - MET  4)  PROM right shoulder abd to 145 or more to allow for AROM when she gets to this point, which will allow for reaching out to the side - MET  5)  Patient to sleep up to 3 hours before the shoulder pain wakes her up - MET (4 HOURS) IN CHAIR/NOT IN BED     LTG's to be met by 6 weeks  1)  Independent with HEP progression - NOT MET/IMPROVED  2)  Decrease pain by 75% or more to allow for patient to perform activities during the day - NOT MET  3)  PROM right shoulder flexion and abd to 165 or more to allow for return to full AROM - NOT MET/IMPROVED  4)  PROM right shoulder ER to 60 - NOT MET/IMPROVED  5)  PROM right shoulder IR to the stomach to allow for return to functional IR - NOT MET/IMPROVED  6)  Patient to have no increase in pain once released from wearing the sling - MET  7)  Patient to sleep up to 5 hours without shoulder pain - MET IN CHAIR/NOT IN BED      Plan  Frequency: 2x week  Duration in weeks: 3  Plan details: Patient will be seen 2x/week       Progress toward previous goals: Partially Met  Recommendations: Continue as planned  Timeframe: 3 weeks  Prognosis to achieve goals: good    PT Signature: Louise Toscano PT      Based upon review of the patient's progress and continued therapy plan, it is my medical opinion that Colette Whitlock should continue physical therapy treatment at  [facility] PHYSICAL THERAPY  [address]  [place] [address] [phone].    Signature: __________________________________  Makenzie Barnett MD    Manual Therapy:    10     mins  45148;  Therapeutic Exercise:    31     mins  60004;     Neuromuscular Doreen:    24    mins  35434;    Therapeutic Activity:          mins  21940;     Gait Training:           mins  89938;     Ultrasound:          mins  28452;    Electrical Stimulation:         mins  48929 ( );  Dry Needling          mins self-pay    Timed Treatment:   65   mins   Total Treatment:     65   mins

## 2024-06-27 ENCOUNTER — TREATMENT (OUTPATIENT)
Dept: PHYSICAL THERAPY | Facility: CLINIC | Age: 59
End: 2024-06-27
Payer: COMMERCIAL

## 2024-06-27 DIAGNOSIS — Z98.890 STATUS POST ROTATOR CUFF REPAIR: Primary | ICD-10-CM

## 2024-06-27 DIAGNOSIS — M25.611 DECREASED RIGHT SHOULDER RANGE OF MOTION: ICD-10-CM

## 2024-06-27 DIAGNOSIS — M75.101 TEAR OF RIGHT ROTATOR CUFF, UNSPECIFIED TEAR EXTENT, UNSPECIFIED WHETHER TRAUMATIC: ICD-10-CM

## 2024-06-27 DIAGNOSIS — M25.511 RIGHT SHOULDER PAIN, UNSPECIFIED CHRONICITY: ICD-10-CM

## 2024-06-27 DIAGNOSIS — R29.898 WEAKNESS OF RIGHT ARM: ICD-10-CM

## 2024-06-27 NOTE — PROGRESS NOTES
Physical Therapy Daily Treatment Note               [address]                                                                                                                                             [place] [address]    Patient: Colette Whitlock   : 1965  Referring practitioner: Makenzie Barnett MD  Date of Initial Visit: Type: THERAPY  Noted: 3/27/2024  Today's Date: 2024  Patient seen for 28 sessions       Visit Diagnoses:    ICD-10-CM ICD-9-CM   1. Status post rotator cuff repair  Z98.890 V45.89   2. Right shoulder pain, unspecified chronicity  M25.511 719.41   3. Decreased right shoulder range of motion  M25.611 719.51   4. Weakness of right arm  R29.898 729.89   5. Tear of right rotator cuff, unspecified tear extent, unspecified whether traumatic  M75.101 840.4       Subjective Evaluation    History of Present Illness    Subjective comment: Pt reports that her (R) arm still feels heavy when sitting around.  Has slight increase in pain in anterior shoulder with household tasks.       Objective   See Exercise, Manual, and Modality Logs for complete treatment.       Assessment & Plan       Assessment  Assessment details: Pt completed treatment with no c/o increased pain.  Pt tolerated increased resistance on several exercises. Pt form remain good with the increased resistance. Pt needed minimal vc for performance. Plan to continue to progress per POC.          Timed:         Manual Therapy:    10     mins  30155;     Therapeutic Exercise:    30     mins  74486;     Neuromuscular Doreen:    19    mins  74630;    Therapeutic Activity:     0     mins  27872;     Gait Trainin     mins  91327;     Ultrasound:     0     mins  97898;    E Stim                            0    mins   18933( g0283)  Work Dougherty/Cond      0    mins   87815        Timed Treatment:   59   mins   Total Treatment:     59   mins    Joni Michaels PTA  [place] License: [id number]

## 2024-07-02 ENCOUNTER — TREATMENT (OUTPATIENT)
Dept: PHYSICAL THERAPY | Facility: CLINIC | Age: 59
End: 2024-07-02
Payer: COMMERCIAL

## 2024-07-02 DIAGNOSIS — R29.898 WEAKNESS OF RIGHT ARM: ICD-10-CM

## 2024-07-02 DIAGNOSIS — M25.511 RIGHT SHOULDER PAIN, UNSPECIFIED CHRONICITY: ICD-10-CM

## 2024-07-02 DIAGNOSIS — Z98.890 STATUS POST ROTATOR CUFF REPAIR: Primary | ICD-10-CM

## 2024-07-02 DIAGNOSIS — M75.101 TEAR OF RIGHT ROTATOR CUFF, UNSPECIFIED TEAR EXTENT, UNSPECIFIED WHETHER TRAUMATIC: ICD-10-CM

## 2024-07-02 DIAGNOSIS — M25.611 DECREASED RIGHT SHOULDER RANGE OF MOTION: ICD-10-CM

## 2024-07-02 NOTE — PROGRESS NOTES
Physical Therapy Daily Treatment Note               [address] [facility] [address]                                                                                                                                             [place] [address]    Patient: Colette Whitlock   : 1965  Referring practitioner: Makenzie Barnett MD  Date of Initial Visit: Type: THERAPY  Noted: 3/27/2024  Today's Date: 2024  Patient seen for 29 sessions       Visit Diagnoses:    ICD-10-CM ICD-9-CM   1. Status post rotator cuff repair  Z98.890 V45.89   2. Right shoulder pain, unspecified chronicity  M25.511 719.41   3. Decreased right shoulder range of motion  M25.611 719.51   4. Weakness of right arm  R29.898 729.89   5. Tear of right rotator cuff, unspecified tear extent, unspecified whether traumatic  M75.101 840.4       Subjective Evaluation    History of Present Illness    Subjective comment: Pt reports that she is still having trouble with IR, reaching across her body, and endurance in (R) shoulder for household tasks.       Objective   See Exercise, Manual, and Modality Logs for complete treatment.       Assessment & Plan       Assessment  Assessment details: Pt completed treatment with no c/o pain in (R) shoulder.  She tolerated increased reps on several exercises with no issues.  She benefited from vc/tc during resisted ER/IR for proper form.  Plan to progress functional strengthening as tolerated.          Timed:         Manual Therapy:    10     mins  18013;     Therapeutic Exercise:    30     mins  42675;     Neuromuscular Doreen:    15    mins  61040;    Therapeutic Activity:     0     mins  89291;     Gait Trainin     mins  34855;     Ultrasound:     0     mins  96729;    E Stim                            0    mins   64614( g0283)  Work Dougherty/Cond      0    mins   34514        Timed Treatment:   55   mins   Total Treatment:     55   mins    Joni Michaels PTA  [place] License: [id number]  
Yes

## 2024-07-05 ENCOUNTER — TREATMENT (OUTPATIENT)
Dept: PHYSICAL THERAPY | Facility: CLINIC | Age: 59
End: 2024-07-05
Payer: COMMERCIAL

## 2024-07-05 DIAGNOSIS — M25.511 RIGHT SHOULDER PAIN, UNSPECIFIED CHRONICITY: ICD-10-CM

## 2024-07-05 DIAGNOSIS — M75.101 TEAR OF RIGHT ROTATOR CUFF, UNSPECIFIED TEAR EXTENT, UNSPECIFIED WHETHER TRAUMATIC: ICD-10-CM

## 2024-07-05 DIAGNOSIS — R29.898 WEAKNESS OF RIGHT ARM: ICD-10-CM

## 2024-07-05 DIAGNOSIS — M25.611 DECREASED RIGHT SHOULDER RANGE OF MOTION: ICD-10-CM

## 2024-07-05 DIAGNOSIS — Z98.890 STATUS POST ROTATOR CUFF REPAIR: Primary | ICD-10-CM

## 2024-07-05 NOTE — PROGRESS NOTES
Physical Therapy Daily Treatment Note               [address] [facility]                                                                                                                                             [place] [address]    Patient: Colette Whitlock   : 1965  Referring practitioner: Makenzie Barnett MD  Date of Initial Visit: Type: THERAPY  Noted: 3/27/2024  Today's Date: 2024  Patient seen for 30 sessions       Visit Diagnoses:    ICD-10-CM ICD-9-CM   1. Status post rotator cuff repair  Z98.890 V45.89   2. Right shoulder pain, unspecified chronicity  M25.511 719.41   3. Decreased right shoulder range of motion  M25.611 719.51   4. Weakness of right arm  R29.898 729.89   5. Tear of right rotator cuff, unspecified tear extent, unspecified whether traumatic  M75.101 840.4       Subjective Evaluation    History of Present Illness    Subjective comment: Pt denies ant pain in  (R) shoulder today.       Objective   See Exercise, Manual, and Modality Logs for complete treatment.   Added back cir and IR stretch with towel across low back     Assessment & Plan       Assessment  Assessment details: Pt completed treatment with no c/o pain in (R) shoulder.  She tolerated the addition IR stretches for improved ROM. She was able to increase resistance on several exercises . Plan to progress per POC.          Timed:         Manual Therapy:    8     mins  36112;     Therapeutic Exercise:    35     mins  47063;     Neuromuscular Doreen:    20    mins  85672;    Therapeutic Activity:     0     mins  16573;     Gait Trainin     mins  35720;     Ultrasound:     0     mins  56291;    E Stim                            0    mins   31067( g0283)  Work Dougherty/Cond      0    mins   67178        Timed Treatment:   63   mins   Total Treatment:     63   mins    Joni Michaels PTA  [place] License: [id number]

## 2024-07-09 ENCOUNTER — TREATMENT (OUTPATIENT)
Dept: PHYSICAL THERAPY | Facility: CLINIC | Age: 59
End: 2024-07-09
Payer: COMMERCIAL

## 2024-07-09 DIAGNOSIS — M25.511 CHRONIC RIGHT SHOULDER PAIN: ICD-10-CM

## 2024-07-09 DIAGNOSIS — M75.101 TEAR OF RIGHT ROTATOR CUFF, UNSPECIFIED TEAR EXTENT, UNSPECIFIED WHETHER TRAUMATIC: ICD-10-CM

## 2024-07-09 DIAGNOSIS — M25.611 DECREASED RIGHT SHOULDER RANGE OF MOTION: ICD-10-CM

## 2024-07-09 DIAGNOSIS — R29.898 WEAKNESS OF RIGHT ARM: ICD-10-CM

## 2024-07-09 DIAGNOSIS — Z98.890 STATUS POST ROTATOR CUFF REPAIR: Primary | ICD-10-CM

## 2024-07-09 DIAGNOSIS — G89.29 CHRONIC RIGHT SHOULDER PAIN: ICD-10-CM

## 2024-07-09 DIAGNOSIS — M25.511 RIGHT SHOULDER PAIN, UNSPECIFIED CHRONICITY: ICD-10-CM

## 2024-07-09 RX ORDER — MELOXICAM 15 MG/1
15 TABLET ORAL DAILY
Qty: 30 TABLET | Refills: 1 | Status: SHIPPED | OUTPATIENT
Start: 2024-07-09 | End: 2024-09-07

## 2024-07-09 NOTE — PROGRESS NOTES
Physical Therapy Daily Treatment Note               [address] [facility] [address]                                                                                                                                             [place] [address]    Patient: Colette Whitlock   : 1965  Referring practitioner: Makenzie Barnett MD  Date of Initial Visit: Type: THERAPY  Noted: 3/27/2024  Today's Date: 2024  Patient seen for 31 sessions       Visit Diagnoses:    ICD-10-CM ICD-9-CM   1. Status post rotator cuff repair  Z98.890 V45.89   2. Right shoulder pain, unspecified chronicity  M25.511 719.41   3. Decreased right shoulder range of motion  M25.611 719.51   4. Weakness of right arm  R29.898 729.89   5. Tear of right rotator cuff, unspecified tear extent, unspecified whether traumatic  M75.101 840.4       Subjective Evaluation    History of Present Illness    Subjective comment: Pt denies any current pain in (R) shoulder.       Objective   See Exercise, Manual, and Modality Logs for complete treatment.       Assessment & Plan       Assessment  Assessment details: Pt completed treatment with minimal c/o pain in (R) shoulder.  Pt had c/o pain at end range of wall walks.  She tolerated the rest of her therapy well.  Pt remains motivated and push herself hard to improve her (R) shoulder strength and ROM.  Continue to progress per POC.          Timed:         Manual Therapy:    10     mins  14634;     Therapeutic Exercise:    38     mins  96019;     Neuromuscular Doreen:    20    mins  79256;    Therapeutic Activity:     0     mins  31578;     Gait Trainin     mins  04113;     Ultrasound:     0     mins  21934;    E Stim                            0    mins   39606( g0283)  Work Dougherty/Cond      0    mins   03023        Timed Treatment:   68   mins   Total Treatment:     68   mins    Joni Michaels PTA  [place] License: [id number]

## 2024-07-11 ENCOUNTER — TREATMENT (OUTPATIENT)
Dept: PHYSICAL THERAPY | Facility: CLINIC | Age: 59
End: 2024-07-11
Payer: COMMERCIAL

## 2024-07-11 DIAGNOSIS — M25.511 RIGHT SHOULDER PAIN, UNSPECIFIED CHRONICITY: ICD-10-CM

## 2024-07-11 DIAGNOSIS — Z98.890 STATUS POST ROTATOR CUFF REPAIR: Primary | ICD-10-CM

## 2024-07-11 DIAGNOSIS — R29.898 WEAKNESS OF RIGHT ARM: ICD-10-CM

## 2024-07-11 DIAGNOSIS — M25.611 DECREASED RIGHT SHOULDER RANGE OF MOTION: ICD-10-CM

## 2024-07-11 NOTE — LETTER
Physical Therapy Daily Treatment Note        Patient: Colette Whitlock   : 1965  Referring practitioner: Makenzie Barnett MD  Date of Initial Visit: Type: THERAPY  Noted: 3/27/2024  Today's Date: 2024  Patient seen for 32 sessions         Visit Diagnoses:  Visit Diagnosis       ICD-10-CM ICD-9-CM   1. Status post rotator cuff repair  Z98.890 V45.89   2. Right shoulder pain, unspecified chronicity  M25.511 719.41   3. Decreased right shoulder range of motion  M25.611 719.51   4. Weakness of right arm  R29.898 729.89   5. Tear of right rotator cuff, unspecified tear extent, unspecified whether traumatic  M75.101 840.4            Subjective Evaluation     History of Present Illness     Subjective comment: Patient reports her shoulder is feeling good but she is sore from coming in yesterday and today. Reaching out away from her body and behind her back is still the most challenging. She is hopeful she will be allowed to work modified duty after seeing the surgeon next week.        Objective            Active Range of Motion      Right Shoulder   Flexion: 123 degrees with pain  Abduction: 112 degrees with pain  External rotation BTH: C5 (with effort and pain) with pain  Internal rotation BTB: Active internal rotation behind the back: to posterior aspect of (R) greater trochanter only. with pain     Passive Range of Motion      Right Shoulder   Flexion: WFL  Abduction: WFL  External rotation 90°: WFL  Internal rotation 90°: 45 degrees with pain     Strength/Myotome Testing      Right Shoulder      Planes of Motion   Flexion: 4-   Abduction: 4-   External rotation at 0°: 4-   Internal rotation at 0°: 4-       See Exercise, Manual, and Modality Logs for complete treatment.         Assessment & Plan         Assessment  Assessment details: Patient has remained motivated and compliant with PT interventions.  Her (R) shoulder symptoms have improved greatly.  She exhibits good quality and quantity of (R) shoulder  PROM all planes except IR which remains moderately limited and painful.  However, patient exhibits significant persistent limitations in (R) shoulder AROM, however, especially in planes of abduction, ER, and especially IR.  This is in spite of manual techniques and extensive AAROM, AROM, and strengthening activities in PT.  Patient is highly motivated to return to work in some capacity, likely modified duty, if her surgeon will allow.  Will await outcome of that appointment on 07/15/2024.        Louise Toscano PT, DPT, OCS  [place] Lic #[id number]

## 2024-07-11 NOTE — PROGRESS NOTES
Physical Therapy Daily Treatment Note      Patient: Colette Whitlock   : 1965  Referring practitioner: Makenzie Barnett MD  Date of Initial Visit: Type: THERAPY  Noted: 3/27/2024  Today's Date: 2024  Patient seen for 32 sessions       Visit Diagnoses:    ICD-10-CM ICD-9-CM   1. Status post rotator cuff repair  Z98.890 V45.89   2. Right shoulder pain, unspecified chronicity  M25.511 719.41   3. Decreased right shoulder range of motion  M25.611 719.51   4. Weakness of right arm  R29.898 729.89   5. Tear of right rotator cuff, unspecified tear extent, unspecified whether traumatic  M75.101 840.4       Subjective Evaluation    History of Present Illness    Subjective comment: Patient reports her shoulder is feeling good but she is sore from coming in yesterday and today. Reaching out away from her body and behind her back is still the most challenging. She is hopeful she will be allowed to work modified duty after seeing the surgeon next week.       Objective          Active Range of Motion     Right Shoulder   Flexion: 123 degrees with pain  Abduction: 112 degrees with pain  External rotation BTH: C5 (with effort and pain) with pain  Internal rotation BTB: Active internal rotation behind the back: to posterior aspect of (R) greater trochanter only. with pain    Passive Range of Motion     Right Shoulder   Flexion: WFL  Abduction: WFL  External rotation 90°: WFL  Internal rotation 90°: 45 degrees with pain    Strength/Myotome Testing     Right Shoulder     Planes of Motion   Flexion: 4-   Abduction: 4-   External rotation at 0°: 4-   Internal rotation at 0°: 4-       See Exercise, Manual, and Modality Logs for complete treatment.       Assessment & Plan       Assessment  Assessment details: Patient has remained motivated and compliant with PT interventions.  Her (R) shoulder symptoms have improved greatly.  She exhibits good quality and quantity of (R) shoulder PROM all planes except IR which remains  moderately limited and painful.  Patient exhibits significant persistent limitations in (R) shoulder AROM, however, especially in planes of abduction, ER, and especially IR.  This is in spite of manual techniques and extensive AAROM, AROM, and strengthening activities in PT.  Patient is highly motivated to return to work in some capacity, likely modified duty, if her surgeon will allow.  Will await outcome of that appointment on 07/15/2024.           Timed:         Manual Therapy:    12     mins  37148;     Therapeutic Exercise:    29     mins  82739;     Neuromuscular Doreen:        mins  93508;    Therapeutic Activity:     13     mins  15622;     Gait Training:           mins  89837;     Ultrasound:          mins  08528;    Ionto                                   mins   79632  Self Care                            mins   67972      Un-Timed:  Electrical Stimulation:         mins  20316 ( );  Dry Needling          mins self-pay  Traction          mins 27124  Canalith Repos         mins 33781    Timed Treatment:   54   mins   Total Treatment:     62   mins    Louise Toscano PT  [place] License: [id number]

## 2024-07-15 ENCOUNTER — OFFICE VISIT (OUTPATIENT)
Dept: ORTHOPEDIC SURGERY | Facility: CLINIC | Age: 59
End: 2024-07-15
Payer: COMMERCIAL

## 2024-07-15 VITALS — WEIGHT: 265 LBS | TEMPERATURE: 98 F | HEIGHT: 64 IN | BODY MASS INDEX: 45.24 KG/M2

## 2024-07-15 DIAGNOSIS — Z98.890 S/P ROTATOR CUFF REPAIR: Primary | ICD-10-CM

## 2024-07-15 PROCEDURE — 99213 OFFICE O/P EST LOW 20 MIN: CPT | Performed by: ORTHOPAEDIC SURGERY

## 2024-07-15 NOTE — PROGRESS NOTES
Patient: Colette Whitlock  YOB: 1965  Date of Service: 7/15/2024    Chief Complaints: Right shoulder pain    Subjective:    History of Present Illness: Pt is seen in the office today with complaints of right shoulder pain she is status post rotator cuff repair 313 so she is 4 months out last time was a little worried about her range of motion she has worked hard on her range of motion is much better.     Allergies:   Allergies   Allergen Reactions    Penicillin G Unknown (See Comments)     Told this as a child       Medications:   Home Medications:  Current Outpatient Medications on File Prior to Visit   Medication Sig    acetaminophen (TYLENOL) 500 MG tablet Take 1 tablet by mouth Every 6 (Six) Hours As Needed for Mild Pain.    amoxicillin (AMOXIL) 500 MG capsule Take 2 capsules by mouth 2 (Two) Times a Day.    Diclofenac Sodium (Voltaren Arthritis Pain) 1 % gel gel Apply 4 g topically to the appropriate area as directed 2 (Two) Times a Day. (Patient not taking: Reported on 6/3/2024)    ketotifen (ZADITOR) 0.025 % ophthalmic solution Apply 1 drop to eye(s) as directed by provider Every 12 (Twelve) Hours As Needed.    meloxicam (MOBIC) 15 MG tablet TAKE 1 TABLET BY MOUTH DAILY FOR 60 DAYS.    montelukast (SINGULAIR) 10 MG tablet Take 1 tablet by mouth every night at bedtime. (Patient not taking: Reported on 6/3/2024)     No current facility-administered medications on file prior to visit.     Current Medications:  Scheduled Meds:  Continuous Infusions:No current facility-administered medications for this visit.    PRN Meds:.    I have reviewed the patient's medical history in detail and updated the computerized patient record.  Review and summarization of old records include:    Past Medical History:   Diagnosis Date    Arthritis     Frozen shoulder     Prediabetes     Right shoulder pain     Seasonal allergies     Torn rotator cuff     RIGHT        Past Surgical History:   Procedure Laterality  Date    ANKLE OPEN REDUCTION INTERNAL FIXATION Right     X2     SECTION      X1    COLONOSCOPY      SHOULDER ARTHROSCOPY W/ ROTATOR CUFF REPAIR Right 3/13/2024    Procedure: SHOULDER ARTHROSCOPY LABERAL DEBRIDEMENT, DECOMPRESSION, WITH WITH  MINI OPEN ROTATOR CUFF REPAIR;  Surgeon: Makenzie Barnett MD;  Location: [facility];  Service: Orthopedics;  Laterality: Right;        Social History     Occupational History    Not on file   Tobacco Use    Smoking status: Never    Smokeless tobacco: Never   Vaping Use    Vaping status: Never Used   Substance and Sexual Activity    Alcohol use: Not Currently    Drug use: Never    Sexual activity: Yes     Partners: Male     Comment: , three children      Social History     Social History Narrative    Not on file        Family History   Problem Relation Age of Onset    No Known Problems Mother     No Known Problems Father     Breast cancer Neg Hx     Colon cancer Neg Hx     Malig Hyperthermia Neg Hx        ROS: 14 point review of systems was performed and was negative except for documented findings in HPI and today's encounter.     Allergies:   Allergies   Allergen Reactions    Penicillin G Unknown (See Comments)     Told this as a child     Constitutional:  Denies fever, shaking or chills   Eyes:  Denies change in visual acuity   HENT:  Denies nasal congestion or sore throat   Respiratory:  Denies cough or shortness of breath   Cardiovascular:  Denies chest pain or severe LE edema   GI:  Denies abdominal pain, nausea, vomiting, bloody stools or diarrhea   Musculoskeletal:  Numbness, tingling, or loss of motor function only as noted above in history of present illness.  : Denies painful urination or hematuria  Integument:  Denies rash, lesion or ulceration   Neurologic:  Denies headache or focal weakness  Endocrine:  Denies lymphadenopathy  Psych:  Denies confusion or change in mental status   Hem:  Denies active bleeding      Physical Exam: 59 y.o. female  Wt  Readings from Last 3 Encounters:   24 120 kg (264 lb 11.2 oz)   24 120 kg (264 lb 6.4 oz)   24 119 kg (263 lb 6.4 oz)       There is no height or weight on file to calculate BMI.    There were no vitals filed for this visit.  Vital signs reviewed.   General Appearance:    Alert, cooperative, in no acute distress                    Ortho exam      Exam of the right shoulder passively and get her to about 165 almost 170 external rotation to 50 internal rotation to her lower lumbar spine rotator cuff strength is 4+ to 5/5           Assessment: Status post rotator cuff repair I think she is doing much better from a range of motion standpoint I want her to continue working on the last little bit of passive range of motion her ability to stay off work will  at the end of the month I think she can return but with the restrictions listed in her return to work letter.  I will see her back in 4 to 6 weeks    Plan:   Follow up as indicated.  Ice, elevate, and rest as needed.  Discussed conservative measures of pain control including ice, bracing.  Makenzie Barnett M.D.

## 2024-07-15 NOTE — LETTER
July 15, 2024     Patient: Colette Whitlock   YOB: 1965   Date of Visit: 7/15/2024       To Whom It May Concern:    It is my medical opinion that Colette Whitlock can return to work on July 29 with the following restrictions: No overhead lifting, no lifting more than 15 pounds with both hands no pushing or pulling more than 20 pounds with both hands. I will reevaluate her 4 weeks after that and decrease those restrictions..           Sincerely,        Makenzie Barnett MD    CC:   No Recipients

## 2024-07-22 ENCOUNTER — HOSPITAL ENCOUNTER (OUTPATIENT)
Dept: MAMMOGRAPHY | Facility: HOSPITAL | Age: 59
Discharge: HOME | End: 2024-07-22
Admitting: FAMILY MEDICINE
Payer: COMMERCIAL

## 2024-07-22 DIAGNOSIS — Z12.31 ENCOUNTER FOR SCREENING MAMMOGRAM FOR BREAST CANCER: ICD-10-CM

## 2024-07-22 PROCEDURE — 77067 SCR MAMMO BI INCL CAD: CPT

## 2024-07-22 PROCEDURE — 77063 BREAST TOMOSYNTHESIS BI: CPT

## 2024-07-31 ENCOUNTER — TELEPHONE (OUTPATIENT)
Dept: ORTHOPEDIC SURGERY | Facility: CLINIC | Age: 59
End: 2024-07-31
Payer: COMMERCIAL

## 2024-07-31 NOTE — TELEPHONE ENCOUNTER
Provider: DR MACIEL     Caller: MIMI MERINO     Relationship to Patient: PATIENT     Phone Number: [phone]    Reason for Call: RIGHT SHOULDER SX 03/13/2024    When was the patient last seen: 07/15/2024    PATIENT STATES SHE [facility] [employer] WITH A LOT OF LIFTING, PUSHING AND PULLING.  PATIENT PROVIDED HER EMPLOYER WITH WORK LETTER FROM 07/15/2024.  PATIENT FEELS EVEN WITH THESE RESTRICTIONS IT IS TOO MUCH.  PATIENT TRIES TO PERFORM HER JOB DUTIES AND IT IS HURTING HER RIGHT SHOULDER.  PATIENT STATES SHE NEEDS THE JOB AND NEEDS THE MONEY BUT NEEDS LIGHT WORK DUTIES.      PATIENT IS HOME FROM WORK TODAY AND HER EMPLOYER TOLD HER SHE MUST GET REVISED WORK STATUS LETTER TODAY 07/31/2024.      LETTER CAN BE FAXED ATTCARLIE AGUIRRE  [phone]    PLEASE CALL PATIENT ASAP TO LET HER KNOW IF THIS CAN BE DONE

## 2024-07-31 NOTE — TELEPHONE ENCOUNTER
Hub staff attempted to follow warm transfer process and was unsuccessful     Caller: NYDIA SIEGEL    Relationship to patient: Emergency Contact    Best call back number: [phone]    Patient is needing: PT'S DAUGHTER CALLING TO PROVIDE SOME UPDATED INFO IN REGARDS TO GETTING THE PT'S WORK RESTRICTIONS UPDATED BY DR MACIEL. PT HAS BEEN PLACED BACK IN HER NORMAL POSITION AT WORK AND IT HAS BEEN CAUSING HER PAIN SINCE SHE RETURNED TO WORK. PT WOULD LIKE FOR DR MACIEL TO WRITE A NEW WORK RESTRICTION LETTER THAT DETAILS HOW PT NEEDS TO BE PUT IN A LIGHTER DUTY POSITION. PLEASE CONTACT PT'S DAUGHTER ABOVE TO DISCUSS EXACT DETAILS.

## 2024-08-01 NOTE — TELEPHONE ENCOUNTER
Left message for patient's daughter.  I think we have this work note resolved.  I did speak with patient yesterday and sent new work note.I told daughter if she needed anything else to let us know

## 2024-08-22 NOTE — PROGRESS NOTES
Shoulder Scope RCR follow Up       Patient: Colette Whitlock        YOB: 1965      Chief Complaints: shoulder pain right      History of Present Illness: Pt is here f/u shoulder arthroscopy, on the right shoulder she is almost 5 months out she continues to well she is back to work with restrictions she has no pain states her motion is getting better she is currently not seeing physical therapy RCR        Allergies:   Allergies   Allergen Reactions    Penicillin G Unknown (See Comments)     Told this as a child       Medications:   Home Medications:  Current Outpatient Medications on File Prior to Visit   Medication Sig    acetaminophen (TYLENOL) 500 MG tablet Take 1 tablet by mouth Every 6 (Six) Hours As Needed for Mild Pain.    amoxicillin (AMOXIL) 500 MG capsule Take 2 capsules by mouth 2 (Two) Times a Day. (Patient not taking: Reported on 7/15/2024)    Diclofenac Sodium (Voltaren Arthritis Pain) 1 % gel gel Apply 4 g topically to the appropriate area as directed 2 (Two) Times a Day. (Patient not taking: Reported on 7/15/2024)    ketotifen (ZADITOR) 0.025 % ophthalmic solution Apply 1 drop to eye(s) as directed by provider Every 12 (Twelve) Hours As Needed.    meloxicam (MOBIC) 15 MG tablet TAKE 1 TABLET BY MOUTH DAILY FOR 60 DAYS.    montelukast (SINGULAIR) 10 MG tablet Take 1 tablet by mouth every night at bedtime.     No current facility-administered medications on file prior to visit.     Current Medications:  Scheduled Meds:  Continuous Infusions:No current facility-administered medications for this visit.    PRN Meds:.          Physical Exam: 59 y.o. female  General Appearance:    Alert, cooperative, in no acute distress                 There were no vitals filed for this visit.   Patient is alert and oriented ×3 no acute distress normal mood physical exam.  Physical exam of the shoulder, incisions looked good there is no erythema,no signs or sx of infection.  Exam of the right shoulder  active forward flexion is to about 170 passively get her to about 175 still somewhat limited external rotation to 50 internal rotation to her buttock rotator cuff strength is 5/5 with isometric testing without symptoms    Assessment  S/P shoulder scope, rotator cuff repair, I think she is doing well I still want her working on her range of motion particularly flexion and internal rotation we talked about how to do that on her own she will continue her strengthening we will decrease her restrictions a little bit at work and then I will see her back in 6 weeks to decrease them further she is not ready to return to work without restrictions

## 2024-08-26 ENCOUNTER — OFFICE VISIT (OUTPATIENT)
Dept: ORTHOPEDIC SURGERY | Facility: CLINIC | Age: 59
End: 2024-08-26
Payer: COMMERCIAL

## 2024-08-26 VITALS — BODY MASS INDEX: 49.69 KG/M2 | HEIGHT: 62 IN | WEIGHT: 270 LBS | TEMPERATURE: 98.2 F

## 2024-08-26 DIAGNOSIS — Z98.890 S/P ROTATOR CUFF REPAIR: Primary | ICD-10-CM

## 2024-08-26 NOTE — LETTER
August 26, 2024     Patient: Colette Whitlock   YOB: 1965   Date of Visit: 8/26/2024       To Whom It May Concern:    It is my medical opinion that Colette Whitlock can continue to work with the restrictions no lifting more than 20 pounds no overhead lifting and no throwing I will reevaluate in 6 weeks with hopeful decrease of restriction.           Sincerely,        Makenzie Barnett MD    CC:   No Recipients

## 2024-09-03 ENCOUNTER — DOCUMENTATION (OUTPATIENT)
Dept: PHYSICAL THERAPY | Facility: CLINIC | Age: 59
End: 2024-09-03
Payer: COMMERCIAL

## 2024-09-05 ENCOUNTER — OFFICE VISIT (OUTPATIENT)
Dept: FAMILY MEDICINE CLINIC | Facility: CLINIC | Age: 59
End: 2024-09-05
Payer: COMMERCIAL

## 2024-09-05 VITALS
OXYGEN SATURATION: 99 % | RESPIRATION RATE: 18 BRPM | BODY MASS INDEX: 48.21 KG/M2 | HEIGHT: 62 IN | SYSTOLIC BLOOD PRESSURE: 108 MMHG | HEART RATE: 80 BPM | DIASTOLIC BLOOD PRESSURE: 86 MMHG | WEIGHT: 262 LBS

## 2024-09-05 DIAGNOSIS — J30.2 SEASONAL ALLERGIES: Primary | ICD-10-CM

## 2024-09-05 DIAGNOSIS — R05.3 CHRONIC COUGH: ICD-10-CM

## 2024-09-05 PROCEDURE — 99214 OFFICE O/P EST MOD 30 MIN: CPT | Performed by: NURSE PRACTITIONER

## 2024-09-05 RX ORDER — BENZONATATE 100 MG/1
100 CAPSULE ORAL 3 TIMES DAILY PRN
Qty: 30 CAPSULE | Refills: 0 | Status: SHIPPED | OUTPATIENT
Start: 2024-09-05 | End: 2024-09-15

## 2024-09-05 RX ORDER — FLUTICASONE PROPIONATE 50 MCG
2 SPRAY, SUSPENSION (ML) NASAL DAILY
Qty: 9.9 ML | Refills: 3 | Status: SHIPPED | OUTPATIENT
Start: 2024-09-05

## 2024-09-05 RX ORDER — CETIRIZINE HYDROCHLORIDE 10 MG/1
10 TABLET ORAL DAILY
Qty: 90 TABLET | Refills: 1 | Status: SHIPPED | OUTPATIENT
Start: 2024-09-05

## 2024-09-05 RX ORDER — MONTELUKAST SODIUM 10 MG/1
10 TABLET ORAL
Qty: 90 TABLET | Refills: 3 | Status: SHIPPED | OUTPATIENT
Start: 2024-09-05

## 2024-09-05 NOTE — PROGRESS NOTES
Subjective   Colette Whitlock is a 59 y.o. female.     Chief Complaint   Patient presents with    Cough     Since June    Nasal Congestion    Mass     Bilateral eyelid        History of Present Illness   Patient presents with c/o nasal congestion and cough. She reports that this started last week, she is reports that she is sneezing. She reports that her symptoms are better one day and worse the next. She reports that today is better.  She was previously taking montelukast and benzonatate and this worked well for her.   The following portions of the patient's history were reviewed and updated as appropriate: allergies, current medications, past family history, past medical history, past social history, past surgical history and problem list.    Review of Systems   Constitutional:  Negative for appetite change, chills, fatigue and fever.   HENT:  Positive for congestion, postnasal drip and sneezing. Negative for ear pain, rhinorrhea, sinus pressure and sore throat.    Eyes:  Negative for photophobia, discharge, redness and itching.   Respiratory:  Positive for cough. Negative for chest tightness, shortness of breath and wheezing.    Cardiovascular:  Negative for chest pain, palpitations and leg swelling.   Musculoskeletal:  Negative for myalgias.   Allergic/Immunologic: Negative.    Neurological:  Negative for dizziness and headache.       Objective   Physical Exam  Vitals and nursing note reviewed.   Constitutional:       Appearance: Normal appearance. She is well-developed.   HENT:      Head: Normocephalic and atraumatic.      Right Ear: Tympanic membrane, ear canal and external ear normal.      Left Ear: Tympanic membrane, ear canal and external ear normal.      Nose: Nose normal.      Right Sinus: No maxillary sinus tenderness or frontal sinus tenderness.      Left Sinus: No maxillary sinus tenderness or frontal sinus tenderness.      Mouth/Throat:      Lips: Pink.      Mouth: Mucous membranes are moist.       Pharynx: Oropharynx is clear. No oropharyngeal exudate.      Comments: Post nasal drainage noted.   Eyes:      General:         Right eye: No discharge.         Left eye: No discharge.      Conjunctiva/sclera: Conjunctivae normal.      Pupils: Pupils are equal, round, and reactive to light.   Neck:      Thyroid: No thyromegaly.   Cardiovascular:      Rate and Rhythm: Normal rate and regular rhythm.      Heart sounds: Normal heart sounds. No murmur heard.  Pulmonary:      Effort: Pulmonary effort is normal. No tachypnea or respiratory distress.      Breath sounds: Normal breath sounds. No decreased breath sounds, wheezing or rales.   Musculoskeletal:      Cervical back: Normal range of motion and neck supple.   Lymphadenopathy:      Cervical: No cervical adenopathy.   Skin:     General: Skin is warm and dry.   Neurological:      Mental Status: She is alert and oriented to person, place, and time.   Psychiatric:         Behavior: Behavior normal.         Thought Content: Thought content normal.         Judgment: Judgment normal.         Vitals:    09/05/24 1445   BP: 108/86   Pulse: 80   Resp: 18   SpO2: 99%     Body mass index is 47.92 kg/m².      Procedures    Assessment & Plan   Problems Addressed this Visit    None  Visit Diagnoses       Seasonal allergies    -  Primary    Relevant Medications    montelukast (SINGULAIR) 10 MG tablet    fluticasone (FLONASE) 50 MCG/ACT nasal spray    cetirizine (zyrTEC) 10 MG tablet    Chronic cough        Relevant Medications    benzonatate (Tessalon Perles) 100 MG capsule          Diagnoses         Codes Comments    Seasonal allergies    -  Primary ICD-10-CM: J30.2  ICD-9-CM: 477.9     Chronic cough     ICD-10-CM: R05.3  ICD-9-CM: 786.2           Montelukast 10mg 1p.o. QHS for seasonal allergies  Zyrtec 10mg 1p.o. QD for seasonal allergies  Fluticasone as directed for seasonal allergies  Benzonatate 100mg 1p.o TID PRN cough           Return if symptoms worsen or fail to  improve.

## 2024-09-12 DIAGNOSIS — M25.511 CHRONIC RIGHT SHOULDER PAIN: ICD-10-CM

## 2024-09-12 DIAGNOSIS — G89.29 CHRONIC RIGHT SHOULDER PAIN: ICD-10-CM

## 2024-09-12 RX ORDER — MELOXICAM 15 MG/1
15 TABLET ORAL DAILY
Qty: 30 TABLET | Refills: 1 | Status: SHIPPED | OUTPATIENT
Start: 2024-09-12

## 2024-09-12 NOTE — TELEPHONE ENCOUNTER
LAST REFILL - 07/09/24  LAST VISIT - 08/17/23 with PCP; 09/05/24 with Luz Lujan  NEXT VISIT - not scheduled

## 2024-10-04 NOTE — PROGRESS NOTES
Patient: Colette Whitlock  YOB: 1965  Date of Service: 10/4/2024    Chief Complaints: Left shoulder pain    Subjective:    History of Present Illness: Pt is seen in the office today with complaints of left shoulder pain she is status post rotator cuff repair she is about 6 and half months out she is working with restrictions she states she gets achy by the end of the day she still a little limited in her range of motion and she is currently not going to physical therapy        Allergies:   Allergies   Allergen Reactions    Penicillin G Unknown (See Comments)     Told this as a child       Medications:   Home Medications:  Current Outpatient Medications on File Prior to Visit   Medication Sig    acetaminophen (TYLENOL) 500 MG tablet Take 1 tablet by mouth Every 6 (Six) Hours As Needed for Mild Pain.    cetirizine (zyrTEC) 10 MG tablet Take 1 tablet by mouth Daily.    Diclofenac Sodium (Voltaren Arthritis Pain) 1 % gel gel Apply 4 g topically to the appropriate area as directed 2 (Two) Times a Day. (Patient not taking: Reported on 7/15/2024)    fluticasone (FLONASE) 50 MCG/ACT nasal spray 2 sprays into the nostril(s) as directed by provider Daily.    ketotifen (ZADITOR) 0.025 % ophthalmic solution Apply 1 drop to eye(s) as directed by provider Every 12 (Twelve) Hours As Needed. (Patient not taking: Reported on 8/26/2024)    meloxicam (MOBIC) 15 MG tablet Take 1 tablet by mouth Daily.    montelukast (SINGULAIR) 10 MG tablet Take 1 tablet by mouth every night at bedtime.     No current facility-administered medications on file prior to visit.     Current Medications:  Scheduled Meds:  Continuous Infusions:No current facility-administered medications for this visit.    PRN Meds:.    I have reviewed the patient's medical history in detail and updated the computerized patient record.  Review and summarization of old records include:    Past Medical History:   Diagnosis Date    Arthritis     Frozen  shoulder     Prediabetes     Right shoulder pain     Seasonal allergies     Torn rotator cuff     RIGHT        Past Surgical History:   Procedure Laterality Date    ANKLE OPEN REDUCTION INTERNAL FIXATION Right     X2     SECTION      X1    COLONOSCOPY      SHOULDER ARTHROSCOPY W/ ROTATOR CUFF REPAIR Right 3/13/2024    Procedure: SHOULDER ARTHROSCOPY LABERAL DEBRIDEMENT, DECOMPRESSION, WITH WITH  MINI OPEN ROTATOR CUFF REPAIR;  Surgeon: Makenzie Barnett MD;  Location: [facility] OR [facility];  Service: Orthopedics;  Laterality: Right;        Social History     Occupational History    Not on file   Tobacco Use    Smoking status: Never    Smokeless tobacco: Never   Vaping Use    Vaping status: Never Used   Substance and Sexual Activity    Alcohol use: Not Currently    Drug use: Never    Sexual activity: Yes     Partners: Male     Comment: , three children      Social History     Social History Narrative    Not on file        Family History   Problem Relation Age of Onset    No Known Problems Mother     No Known Problems Father     Breast cancer Neg Hx     Colon cancer Neg Hx     Malig Hyperthermia Neg Hx        ROS: 14 point review of systems was performed and was negative except for documented findings in HPI and today's encounter.     Allergies:   Allergies   Allergen Reactions    Penicillin G Unknown (See Comments)     Told this as a child     Constitutional:  Denies fever, shaking or chills   Eyes:  Denies change in visual acuity   HENT:  Denies nasal congestion or sore throat   Respiratory:  Denies cough or shortness of breath   Cardiovascular:  Denies chest pain or severe LE edema   GI:  Denies abdominal pain, nausea, vomiting, bloody stools or diarrhea   Musculoskeletal:  Numbness, tingling, or loss of motor function only as noted above in history of present illness.  : Denies painful urination or hematuria  Integument:  Denies rash, lesion or ulceration   Neurologic:  Denies headache or focal  weakness  Endocrine:  Denies lymphadenopathy  Psych:  Denies confusion or change in mental status   Hem:  Denies active bleeding      Physical Exam: 59 y.o. female  Wt Readings from Last 3 Encounters:   09/05/24 119 kg (262 lb)   08/26/24 122 kg (270 lb)   07/15/24 120 kg (265 lb)       There is no height or weight on file to calculate BMI.    There were no vitals filed for this visit.  Vital signs reviewed.   General Appearance:    Alert, cooperative, in no acute distress                    Ortho exam    Exam of the right shoulder active forward flexion is to 160 passively and get her to about 170 maybe a little beyond external rotation to 40 internal rotation is still quite limited to her buttock rotator cuff strength is 4+ to 5/5             Assessment: Status post rotator cuff repair I think she is improving I think she needs to get that last little bit of range of motion I showed her ways to really continue to push this I would like her to get back into physical therapy we will continue her same restrictions and I will see her back 6 weeks    Plan: As above  Follow up as indicated.  Ice, elevate, and rest as needed.  Discussed conservative measures of pain control including ice, bracing.  Also talked about the importance of strengthening and range of motion I do think she would benefit from a glenohumeral injection right calm that down and hopefully she can regain the last little bit of her motion  Makenzie Barnett M.D.    Large Joint Arthrocentesis: R glenohumeral  Date/Time: 10/7/2024 5:06 PM  Consent given by: patient  Site marked: site marked  Timeout: Immediately prior to procedure a time out was called to verify the correct patient, procedure, equipment, support staff and site/side marked as required   Supporting Documentation  Indications: pain   Procedure Details  Location: shoulder - R glenohumeral  Preparation: Patient was prepped and draped in the usual sterile fashion  Needle gauge: 21G.  Approach:  posterior  Medications administered: 1 mL methylPREDNISolone acetate 80 MG/ML; 2 mL lidocaine PF 1% 1 %  Patient tolerance: patient tolerated the procedure well with no immediate complications

## 2024-10-07 ENCOUNTER — OFFICE VISIT (OUTPATIENT)
Dept: ORTHOPEDIC SURGERY | Facility: CLINIC | Age: 59
End: 2024-10-07
Payer: COMMERCIAL

## 2024-10-07 VITALS — TEMPERATURE: 97.8 F | WEIGHT: 274.2 LBS | HEIGHT: 61 IN | BODY MASS INDEX: 51.77 KG/M2

## 2024-10-07 DIAGNOSIS — Z98.890 S/P ROTATOR CUFF REPAIR: Primary | ICD-10-CM

## 2024-10-07 DIAGNOSIS — M75.01 ADHESIVE CAPSULITIS OF RIGHT SHOULDER: ICD-10-CM

## 2024-10-07 RX ADMIN — LIDOCAINE HYDROCHLORIDE 2 ML: 10 INJECTION, SOLUTION EPIDURAL; INFILTRATION; INTRACAUDAL; PERINEURAL at 17:06

## 2024-10-07 RX ADMIN — METHYLPREDNISOLONE ACETATE 1 ML: 80 INJECTION, SUSPENSION INTRA-ARTICULAR; INTRALESIONAL; INTRAMUSCULAR; SOFT TISSUE at 17:06

## 2024-10-07 NOTE — LETTER
October 7, 2024     Patient: Colette Whitlock   YOB: 1965   Date of Visit: 10/7/2024       To Whom It May Concern:    It is my medical opinion that Colette Whitlock will continue the same restrictions.  I will reevaluate her in 6 weeks.           Sincerely,        Makenzie Barnett MD    CC:   No Recipients

## 2024-10-08 RX ORDER — LIDOCAINE HYDROCHLORIDE 10 MG/ML
2 INJECTION, SOLUTION EPIDURAL; INFILTRATION; INTRACAUDAL; PERINEURAL
Status: COMPLETED | OUTPATIENT
Start: 2024-10-07 | End: 2024-10-07

## 2024-10-08 RX ORDER — METHYLPREDNISOLONE ACETATE 80 MG/ML
1 INJECTION, SUSPENSION INTRA-ARTICULAR; INTRALESIONAL; INTRAMUSCULAR; SOFT TISSUE
Status: COMPLETED | OUTPATIENT
Start: 2024-10-07 | End: 2024-10-07

## 2024-10-14 ENCOUNTER — TREATMENT (OUTPATIENT)
Dept: PHYSICAL THERAPY | Facility: CLINIC | Age: 59
End: 2024-10-14
Payer: COMMERCIAL

## 2024-10-14 DIAGNOSIS — Z98.890 HISTORY OF REPAIR OF RIGHT ROTATOR CUFF: ICD-10-CM

## 2024-10-14 DIAGNOSIS — G89.29 CHRONIC RIGHT SHOULDER PAIN: Primary | ICD-10-CM

## 2024-10-14 DIAGNOSIS — M25.511 CHRONIC RIGHT SHOULDER PAIN: Primary | ICD-10-CM

## 2024-10-14 PROCEDURE — 97530 THERAPEUTIC ACTIVITIES: CPT | Performed by: PHYSICAL THERAPIST

## 2024-10-14 PROCEDURE — 97110 THERAPEUTIC EXERCISES: CPT | Performed by: PHYSICAL THERAPIST

## 2024-10-14 PROCEDURE — 97112 NEUROMUSCULAR REEDUCATION: CPT | Performed by: PHYSICAL THERAPIST

## 2024-10-14 PROCEDURE — 97161 PT EVAL LOW COMPLEX 20 MIN: CPT | Performed by: PHYSICAL THERAPIST

## 2024-10-14 NOTE — PROGRESS NOTES
"Physical Therapy Initial Evaluation and Plan of Care  [address], [address], [place] [address]    Patient: Colette Whitlock   : 1965  Diagnosis/ICD-10 Code:  Chronic right shoulder pain [M25.511, G89.29]  Referring practitioner: Makenzie Barnett MD    Subjective Evaluation    History of Present Illness  Mechanism of injury: History of (R) RCR 2024. She participated in extensive PT to rehab her (R) shoulder (32 sessions) with good outcome and returned to work with restrictions.      She is still having trouble reaching behind her head and her back with her (R) hand.  She has trouble at work with reaching out at shoulder level and above and pulling. She is also limited in reaching out to the side. She still has some pain but it is not her primary c/o at this time; she wants to be be stronger and be able to use her (R) UE like her (L) UE.     She received a (R) GH intraarticular injection by Dr. Barnett on 10/07/2024.  She reports that her (R) shoulder does not feel as tight as before.       Patient Occupation: [employer] Quality of life: good    Pain  Current pain ratin  Aggravating factors: outstretched reach, repetitive movement, overhead activity, lifting and movement    Diagnostic Tests  No diagnostic tests performed    Patient Goals  Patient goals for therapy: increased strength  Patient goal: \"really want my arm to be stronger\"         Subjective Questionnaire: QuickDASH: 31.82%    Objective          Active Range of Motion     Right Shoulder   Flexion: 130 degrees   Extension: 41 degrees   Abduction: 114 degrees   External rotation 90°: 42 degrees  Internal rotation 90°: 42 degrees     Strength/Myotome Testing     Right Shoulder     Planes of Motion   Flexion: 4-   Extension: 4   Abduction: 4-   External rotation at 0°: 4-   Internal rotation at 0°: 4-     Right Elbow   Flexion: 4  Extension: 4-          Assessment & Plan       Assessment  Impairments: abnormal coordination, abnormal " muscle firing, abnormal or restricted ROM, activity intolerance, impaired physical strength and lacks appropriate home exercise program   Functional limitations: carrying objects, lifting, pulling, pushing, uncomfortable because of pain, reaching behind back, reaching overhead and unable to perform repetitive tasks   Assessment details: Patient is a 59 y.o female who underwent (R) RCR on 03/13/2024.  She completed an extensive course of PT with good outcome but upon returning to work felt persistent (R) shoulder weakness and difficulty with repetitive work tasks such as reaching overhead and pulling.  She also feels limited in her ability to reach behind her back, behind her head, and out to the side due to weakness.  She states she is not having much pain at this time.  She demonstrates limited (R) shoulder AROM in multiple planes as well as global moderate weakness of (R) shoulder girdle.  Her QuickDASH score is 31.82% indicating a moderate perceived level of functional limitation.  She will benefit from skilled PT services to address these deficits and optimize functional recovery such that she performs all ADLs and work tasks uncompensated.  Prognosis: good    Goals  Plan Goals: STGs: to be met in 6 weeks  1. Patient will be independent with initial HEP  2. Patient will demonstrate improved (R) shoulder AROM flexion 140 deg and abduction 120 deg for improved ability to reach overhead and out to the side  3. Patient will demonstrate improved (R) shoulder functional ER behind head and functional IR behind back for improved self care tasks and ADL performance    LTGs: to be met in 12 weeks  1. Patient will be independent with progressed HEP  2. Patient will have improved (R) shoulder MMT grossly >/= 4+/5 all planes for improved functional use of postoperative (R) UE  3. Patient will report consistently performing all work tasks without compensation due to her (R) shoulder  4. Patient will have improved QuickDASH  score </= 20% for subjective evidence of functional improvement    Plan  Therapy options: will be seen for skilled therapy services  Planned modality interventions: cryotherapy, thermotherapy (hydrocollator packs) and electrical stimulation/Russian stimulation  Planned therapy interventions: ADL retraining, flexibility, functional ROM exercises, home exercise program, joint mobilization, manual therapy, neuromuscular re-education, soft tissue mobilization, strengthening, stretching and therapeutic activities  Frequency: 2x week  Duration in weeks: 12  Treatment plan discussed with: patient        Manual Therapy:         mins  15926;  Therapeutic Exercise:    20     mins  92733;     Neuromuscular Doreen:    11    mins  33612;    Therapeutic Activity:     9     mins  27169;     Gait Training:           mins  53362;     Ultrasound:          mins  44100;    Electrical Stimulation:         mins  98362 ( );  Dry Needling          mins self-pay    Timed Treatment:   40   mins   Total Treatment:     53   mins    PT SIGNATURE: Louise Toscano PT, DPT, OCS  Electronically signed by: Louise Toscano PT, 10/14/24, 2:22 PM EDT  [place] License #[id number]     DATE TREATMENT INITIATED: 10/14/2024    Initial Certification  Certification Period: 10/14/2024 thru 1/11/2025  I certify that the therapy services are furnished while this patient is under my care.  The services outlined above are required by this patient, and will be reviewed every 90 days.     PHYSICIAN: Makenzie Barnett MD  [phone]                                          DATE:     Please sign and return via fax to [phone]. Thank you, [facility].

## 2024-10-16 ENCOUNTER — TREATMENT (OUTPATIENT)
Dept: PHYSICAL THERAPY | Facility: CLINIC | Age: 59
End: 2024-10-16
Payer: COMMERCIAL

## 2024-10-16 DIAGNOSIS — Z98.890 HISTORY OF REPAIR OF RIGHT ROTATOR CUFF: ICD-10-CM

## 2024-10-16 DIAGNOSIS — M25.511 CHRONIC RIGHT SHOULDER PAIN: Primary | ICD-10-CM

## 2024-10-16 DIAGNOSIS — G89.29 CHRONIC RIGHT SHOULDER PAIN: Primary | ICD-10-CM

## 2024-10-16 PROCEDURE — 97530 THERAPEUTIC ACTIVITIES: CPT | Performed by: PHYSICAL THERAPIST

## 2024-10-16 PROCEDURE — 97112 NEUROMUSCULAR REEDUCATION: CPT | Performed by: PHYSICAL THERAPIST

## 2024-10-16 PROCEDURE — 97110 THERAPEUTIC EXERCISES: CPT | Performed by: PHYSICAL THERAPIST

## 2024-10-16 NOTE — PROGRESS NOTES
Physical Therapy Daily Treatment Note               [address]                                                                                                                                             [place] [address]    Patient: Colette Whitlock   : 1965  Referring practitioner: Makenzie Barnett MD  Date of Initial Visit: Type: THERAPY  Noted: 10/14/2024  Today's Date: 10/16/2024  Patient seen for 2 sessions       Visit Diagnoses:    ICD-10-CM ICD-9-CM   1. Chronic right shoulder pain  M25.511 719.41    G89.29 338.29   2. History of repair of right rotator cuff  Z98.890 V15.29       Subjective Evaluation    History of Present Illness    Subjective comment: Pt reports that her (R) shoulder is sore from work.       Objective   See Exercise, Manual, and Modality Logs for complete treatment.   Added s/I shlder abd, and biceps curl    Assessment & Plan       Assessment  Assessment details: Pt completed treatment with no c/o pain in (R) shoulder.  Pt was able to progress reps on several exercises with no issues. She tolerated the addition of s/l shoulder abd, and bicep curls for improved functional strength.  Plan to progress per POC.          Timed:         Manual Therapy:    0     mins  68825;     Therapeutic Exercise:    20     mins  37445;     Neuromuscular Doreen:    10    mins  28638;    Therapeutic Activity:     10     mins  29302;     Gait Trainin     mins  76434;     Ultrasound:     0     mins  20637;    E Stim                            0    mins   56276( [id number])  Work Dougherty/Cond      0    mins   58234        Timed Treatment:   40   mins   Total Treatment:     40   mins    Joni Michaels PTA  [place] License: [id number]

## 2024-10-21 ENCOUNTER — TREATMENT (OUTPATIENT)
Dept: PHYSICAL THERAPY | Facility: CLINIC | Age: 59
End: 2024-10-21
Payer: COMMERCIAL

## 2024-10-21 DIAGNOSIS — G89.29 CHRONIC RIGHT SHOULDER PAIN: Primary | ICD-10-CM

## 2024-10-21 DIAGNOSIS — Z98.890 HISTORY OF REPAIR OF RIGHT ROTATOR CUFF: ICD-10-CM

## 2024-10-21 DIAGNOSIS — M25.511 CHRONIC RIGHT SHOULDER PAIN: Primary | ICD-10-CM

## 2024-10-21 PROCEDURE — 97530 THERAPEUTIC ACTIVITIES: CPT | Performed by: PHYSICAL THERAPIST

## 2024-10-21 PROCEDURE — 97110 THERAPEUTIC EXERCISES: CPT | Performed by: PHYSICAL THERAPIST

## 2024-10-21 PROCEDURE — 97112 NEUROMUSCULAR REEDUCATION: CPT | Performed by: PHYSICAL THERAPIST

## 2024-10-21 NOTE — PROGRESS NOTES
"Physical Therapy Daily Treatment Note               [address] [facility] [address]                                                                                                                                             [place] [address]    Patient: Colette Whitlock   : 1965  Referring practitioner: Makenzie Barnett MD  Date of Initial Visit: Type: THERAPY  Noted: 10/14/2024  Today's Date: 10/21/2024  Patient seen for 3 sessions       Visit Diagnoses:    ICD-10-CM ICD-9-CM   1. Chronic right shoulder pain  M25.511 719.41    G89.29 338.29   2. History of repair of right rotator cuff  Z98.890 V15.29       Subjective Evaluation    History of Present Illness    Subjective comment: When asked if she had any (R) shoulder pain pt shrugged and said \"I'm coming from work\"       Objective   See Exercise, Manual, and Modality Logs for complete treatment.   Added UBE, and BTE ER/IR    Assessment & Plan       Assessment  Assessment details: Pt completed treatment with minimal c/o pain in (R) shoulder.  Pt was able to progress reps and/or weight on several exercises. She tolerated the addition of the upper body ergometer for improved muscular endurance in (B) shoulders.  Pt continues to have a painful area on anterior of (R) shoulder.  Plan to progress per POC.          Timed:         Manual Therapy:    0     mins  39410;     Therapeutic Exercise:    25     mins  71440;     Neuromuscular Doreen:    10    mins  20235;    Therapeutic Activity:     10     mins  65958;     Gait Trainin     mins  13798;     Ultrasound:     0     mins  95992;    E Stim                            0    mins   90655( g0283)  Work Dougherty/Cond      0    mins   01663        Timed Treatment:   45   mins   Total Treatment:     45   mins    Joni Michaels PTA  [place] License: [id number]  "

## 2024-10-23 ENCOUNTER — TREATMENT (OUTPATIENT)
Dept: PHYSICAL THERAPY | Facility: CLINIC | Age: 59
End: 2024-10-23
Payer: COMMERCIAL

## 2024-10-23 DIAGNOSIS — Z98.890 HISTORY OF REPAIR OF RIGHT ROTATOR CUFF: ICD-10-CM

## 2024-10-23 DIAGNOSIS — M25.511 CHRONIC RIGHT SHOULDER PAIN: Primary | ICD-10-CM

## 2024-10-23 DIAGNOSIS — G89.29 CHRONIC RIGHT SHOULDER PAIN: Primary | ICD-10-CM

## 2024-10-23 PROCEDURE — 97110 THERAPEUTIC EXERCISES: CPT | Performed by: PHYSICAL THERAPIST

## 2024-10-23 PROCEDURE — 97530 THERAPEUTIC ACTIVITIES: CPT | Performed by: PHYSICAL THERAPIST

## 2024-10-23 PROCEDURE — 97112 NEUROMUSCULAR REEDUCATION: CPT | Performed by: PHYSICAL THERAPIST

## 2024-10-23 NOTE — PROGRESS NOTES
Physical Therapy Daily Treatment Note      [address] [facility], [address], [place] [id number]    Patient: Colette Whitlock   : 1965  Referring practitioner: Makenzie Barnett MD  Date of Initial Visit: Type: THERAPY  Noted: 10/14/2024  Today's Date: 10/23/2024  Patient seen for 4 sessions         Visit Diagnoses:     ICD-10-CM ICD-9-CM   1. Chronic right shoulder pain  M25.511 719.41    G89.29 338.29   2. History of repair of right rotator cuff  Z98.890 V15.29         Subjective Evaluation    History of Present Illness    Subjective comment: My shoulder is just tight and sore but doing ok.       Objective   See Exercise, Manual, and Modality Logs for complete treatment.   *Progressed supine shoulder flexion and sidelying shoulder abduction to 3#  *Initiated corner stretch    Assessment & Plan       Assessment  Assessment details: Patient demonstrates good tolerance for strengthening progressions for the (R) shoulder this date as well as addition of pectoral and anterior shoulder stretching. She exhibits persistent tightness of (R) anterior shoulder musculature and a great limitation of (R) shoulder IR ROM. She will benefit from further progression of (R) shoulder soft tissue stretching, joint ROM, and muscle strengthening activities to optimize functional use of (R) UE postoperatively.          Progress strengthening /stabilization /functional activity         Timed:  Manual Therapy:         mins  90767;  Therapeutic Exercise:    26     mins  98553;     Neuromuscular Doreen:    8    mins  19604;    Therapeutic Activity:     10     mins  41489;     Gait Training:           mins  50624;     Ultrasound:          mins  92289;    Untimed:  Electrical Stimulation:         mins  66610 ( );  Mechanical Traction:         mins  01058;   Dry Needling              ___  mins   15703    Timed Treatment:   44   mins   Total Treatment:     44   mins    Louise Toscano, PT, DPT, OCS  Physical Therapist  [place] License  #[id number]  Electronically signed by: Louise Toscano, PT, 10/23/24, 2:22 PM EDT

## 2024-10-28 ENCOUNTER — TREATMENT (OUTPATIENT)
Dept: PHYSICAL THERAPY | Facility: CLINIC | Age: 59
End: 2024-10-28
Payer: COMMERCIAL

## 2024-10-28 DIAGNOSIS — G89.29 CHRONIC RIGHT SHOULDER PAIN: Primary | ICD-10-CM

## 2024-10-28 DIAGNOSIS — Z98.890 HISTORY OF REPAIR OF RIGHT ROTATOR CUFF: ICD-10-CM

## 2024-10-28 DIAGNOSIS — M25.511 CHRONIC RIGHT SHOULDER PAIN: Primary | ICD-10-CM

## 2024-10-28 PROCEDURE — 97110 THERAPEUTIC EXERCISES: CPT | Performed by: PHYSICAL THERAPIST

## 2024-10-28 PROCEDURE — 97112 NEUROMUSCULAR REEDUCATION: CPT | Performed by: PHYSICAL THERAPIST

## 2024-10-28 PROCEDURE — 97530 THERAPEUTIC ACTIVITIES: CPT | Performed by: PHYSICAL THERAPIST

## 2024-10-28 NOTE — PROGRESS NOTES
"Physical Therapy Daily Treatment Note               [address] [facility] [address]                                                                                                                                             [place] [address]    Patient: Colette Whitlock   : 1965  Referring practitioner: Makenzie Barnett MD  Date of Initial Visit: Type: THERAPY  Noted: 10/14/2024  Today's Date: 10/28/2024  Patient seen for 5 sessions       Visit Diagnoses:    ICD-10-CM ICD-9-CM   1. Chronic right shoulder pain  M25.511 719.41    G89.29 338.29   2. History of repair of right rotator cuff  Z98.890 V15.29       Subjective Evaluation    History of Present Illness    Subjective comment: Pt reports \"I'm coming from worl, so I have a little pain.\"       Objective   See Exercise, Manual, and Modality Logs for complete treatment.       Assessment & Plan       Assessment  Assessment details: Pt completed treatment with no c/o pain in (R) shoulder.  Pt was able to progress volume of several strengthening exercises with no issues.  Pt is showing progress with increased IR behind her back.  Plan to continue to progress per POC.          Timed:         Manual Therapy:    0     mins  18034;     Therapeutic Exercise:    25     mins  05337;     Neuromuscular Doreen:    8    mins  93416;    Therapeutic Activity:     10     mins  21072;     Gait Trainin     mins  19859;     Ultrasound:     0     mins  21906;    E Stim                            0    mins   93583( g0283)  Work Dougherty/Cond      0    mins   41640        Timed Treatment:   43   mins   Total Treatment:     43   mins    Joni Michaels PTA  [place] License: [id number]  "

## 2024-10-30 ENCOUNTER — TREATMENT (OUTPATIENT)
Dept: PHYSICAL THERAPY | Facility: CLINIC | Age: 59
End: 2024-10-30
Payer: COMMERCIAL

## 2024-10-30 DIAGNOSIS — G89.29 CHRONIC RIGHT SHOULDER PAIN: Primary | ICD-10-CM

## 2024-10-30 DIAGNOSIS — M25.511 CHRONIC RIGHT SHOULDER PAIN: Primary | ICD-10-CM

## 2024-10-30 DIAGNOSIS — Z98.890 HISTORY OF REPAIR OF RIGHT ROTATOR CUFF: ICD-10-CM

## 2024-10-30 PROCEDURE — 97110 THERAPEUTIC EXERCISES: CPT | Performed by: PHYSICAL THERAPIST

## 2024-10-30 PROCEDURE — 97530 THERAPEUTIC ACTIVITIES: CPT | Performed by: PHYSICAL THERAPIST

## 2024-10-30 NOTE — PROGRESS NOTES
"Physical Therapy Daily Treatment Note               [address] [facility] [address]                                                                                                                                             [place] [address]    Patient: Colette Whitlock   : 1965  Referring practitioner: Makenzie Barnett MD  Date of Initial Visit: Type: THERAPY  Noted: 10/14/2024  Today's Date: 10/30/2024  Patient seen for 6 sessions       Visit Diagnoses:    ICD-10-CM ICD-9-CM   1. Chronic right shoulder pain  M25.511 719.41    G89.29 338.29   2. History of repair of right rotator cuff  Z98.890 V15.29       Subjective Evaluation    History of Present Illness    Subjective comment: Pt denies any pain in her (R) shoulder.  She is \"just tired from work\".       Objective   See Exercise, Manual, and Modality Logs for complete treatment.       Assessment & Plan       Assessment  Assessment details: Pt completed treatment with no provocation of (R) shoulder pain.  Pt displays improved form on strengthening exercises .  She was able to increase reps on several exercises today. Plan to progress resistance on her exercises next treatment.          Timed:         Manual Therapy:    0     mins  52061;     Therapeutic Exercise:    30     mins  29746;     Neuromuscular Doreen:    0    mins  62895;    Therapeutic Activity:     11     mins  56682;     Gait Trainin     mins  53689;     Ultrasound:     0     mins  29412;    E Stim                            0    mins   27152( g0283)  Work Dougherty/Cond      0    mins   59679        Timed Treatment:   41   mins   Total Treatment:     41   mins    Joni Michaels PTA  [place] License: [id number]  "

## 2024-11-04 ENCOUNTER — TREATMENT (OUTPATIENT)
Dept: PHYSICAL THERAPY | Facility: CLINIC | Age: 59
End: 2024-11-04
Payer: COMMERCIAL

## 2024-11-04 DIAGNOSIS — G89.29 CHRONIC RIGHT SHOULDER PAIN: ICD-10-CM

## 2024-11-04 DIAGNOSIS — M25.511 CHRONIC RIGHT SHOULDER PAIN: ICD-10-CM

## 2024-11-04 DIAGNOSIS — Z98.890 HISTORY OF REPAIR OF RIGHT ROTATOR CUFF: Primary | ICD-10-CM

## 2024-11-04 PROCEDURE — 97110 THERAPEUTIC EXERCISES: CPT | Performed by: PHYSICAL THERAPIST

## 2024-11-04 PROCEDURE — 97530 THERAPEUTIC ACTIVITIES: CPT | Performed by: PHYSICAL THERAPIST

## 2024-11-04 NOTE — PROGRESS NOTES
"Physical Therapy Daily Treatment Note               [address] [facility] [address]                                                                                                                                             [place] [address]    Patient: Colette Whitlock   : 1965  Referring practitioner: Makenzie Barnett MD  Date of Initial Visit: Type: THERAPY  Noted: 10/14/2024  Today's Date: 2024  Patient seen for 7 sessions       Visit Diagnoses:    ICD-10-CM ICD-9-CM   1. History of repair of right rotator cuff  Z98.890 V15.29   2. 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       Objective   See Exercise, Manual, and Modality Logs for complete treatment.   Added Weight transfer-36\" shelf to over head, Ball circles ,  and TB pull     Assessment & Plan       Assessment  Assessment details: Pt completed treatment with no c/o pain in her (R) shoulder.  Pt tolerated the addition of weight transfer between shelves, ball circles at shoulder height, and TB pulls for improved functional strength.  Plan to progress per POC.          Timed:         Manual Therapy:    0     mins  68805;     Therapeutic Exercise:    30     mins  17018;     Neuromuscular Doreen:    0    mins  37615;    Therapeutic Activity:     14     mins  47533;     Gait Trainin     mins  62726;     Ultrasound:     0     mins  46094;    E Stim                            0    mins   25556( g0283)  Work Dougherty/Cond      0    mins   25369        Timed Treatment:   44   mins   Total Treatment:     44   mins    Joni Michaels PTA  [place] License: [id number]  "

## 2024-11-06 ENCOUNTER — TREATMENT (OUTPATIENT)
Dept: PHYSICAL THERAPY | Facility: CLINIC | Age: 59
End: 2024-11-06
Payer: COMMERCIAL

## 2024-11-06 DIAGNOSIS — G89.29 CHRONIC RIGHT SHOULDER PAIN: ICD-10-CM

## 2024-11-06 DIAGNOSIS — M25.511 CHRONIC RIGHT SHOULDER PAIN: ICD-10-CM

## 2024-11-06 DIAGNOSIS — Z98.890 HISTORY OF REPAIR OF RIGHT ROTATOR CUFF: Primary | ICD-10-CM

## 2024-11-06 NOTE — PROGRESS NOTES
Physical Therapy Daily Treatment Note               [address] [facility] [address]                                                                                                                                             [place] [address]    Patient: Colette Whitlock   : 1965  Referring practitioner: Makenzie Barnett MD  Date of Initial Visit: Type: THERAPY  Noted: 10/14/2024  Today's Date: 2024  Patient seen for 8 sessions       Visit Diagnoses:    ICD-10-CM ICD-9-CM   1. History of repair of right rotator cuff  Z98.890 V15.29   2. Chronic right shoulder pain  M25.511 719.41    G89.29 338.29       Subjective Evaluation    History of Present Illness    Subjective comment: Pt reports that she still has some discomfort in anterior of (R) shoulder.       Objective   See Exercise, Manual, and Modality Logs for complete treatment.       Assessment & Plan       Assessment  Assessment details: Pt completed treatment with no c/o pain in (R) shoulder.  She was able to progress weighted resistance for RTC 3 way and weight transfers. Plan to progress per pOC.          Timed:         Manual Therapy:    0     mins  21916;     Therapeutic Exercise:    29     mins  66081;     Neuromuscular Doreen:    0    mins  33015;    Therapeutic Activity:     16     mins  65371;     Gait Trainin     mins  82944;     Ultrasound:     0     mins  82591;    E Stim                            0    mins   83061( g0283)  Work Dougherty/Cond      0    mins   00356        Timed Treatment:   45   mins   Total Treatment:     45   mins    Joni Michaels PTA  [place] License: [id number]

## 2024-11-11 ENCOUNTER — TREATMENT (OUTPATIENT)
Dept: PHYSICAL THERAPY | Facility: CLINIC | Age: 59
End: 2024-11-11
Payer: COMMERCIAL

## 2024-11-11 DIAGNOSIS — G89.29 CHRONIC RIGHT SHOULDER PAIN: ICD-10-CM

## 2024-11-11 DIAGNOSIS — M25.511 CHRONIC RIGHT SHOULDER PAIN: ICD-10-CM

## 2024-11-11 DIAGNOSIS — Z98.890 HISTORY OF REPAIR OF RIGHT ROTATOR CUFF: Primary | ICD-10-CM

## 2024-11-11 PROCEDURE — 97110 THERAPEUTIC EXERCISES: CPT | Performed by: PHYSICAL THERAPIST

## 2024-11-11 PROCEDURE — 97530 THERAPEUTIC ACTIVITIES: CPT | Performed by: PHYSICAL THERAPIST

## 2024-11-11 NOTE — PROGRESS NOTES
Physical Therapy Daily Treatment Note               [address] [facility] [address]                                                                                                                                             [place] [address]    Patient: Colette Whitlock   : 1965  Referring practitioner: Makenzie Barnett MD  Date of Initial Visit: Type: THERAPY  Noted: 10/14/2024  Today's Date: 2024  Patient seen for 9 sessions       Visit Diagnoses:    ICD-10-CM ICD-9-CM   1. History of repair of right rotator cuff  Z98.890 V15.29   2. Chronic right shoulder pain  M25.511 719.41    G89.29 338.29       Subjective Evaluation    History of Present Illness    Subjective comment: Pt reports that she is not having pain like she had before, but still has ache in anterior of (R) shoulder.       Objective   See Exercise, Manual, and Modality Logs for complete treatment.       Assessment & Plan       Assessment  Assessment details: Pt completed treatment with minimal c/o increased pain in (R) shoulder. Pt tolerated progression of weight for weight transfer.  Pt had better form on supine resisted shoulder flexion. Plan to progress per POC.          Timed:         Manual Therapy:    0     mins  30673;     Therapeutic Exercise:    30     mins  04356;     Neuromuscular Doreen:    0    mins  99786;    Therapeutic Activity:     19     mins  43541;     Gait Trainin     mins  52433;     Ultrasound:     0     mins  35062;    E Stim                            0    mins   26603( g0283)  Work Dougherty/Cond      0    mins   91914        Timed Treatment:   49   mins   Total Treatment:     49   mins    Joni Michaels PTA  [place] License: [id number]

## 2024-11-13 ENCOUNTER — TREATMENT (OUTPATIENT)
Dept: PHYSICAL THERAPY | Facility: CLINIC | Age: 59
End: 2024-11-13
Payer: COMMERCIAL

## 2024-11-13 DIAGNOSIS — M25.511 CHRONIC RIGHT SHOULDER PAIN: ICD-10-CM

## 2024-11-13 DIAGNOSIS — Z98.890 HISTORY OF REPAIR OF RIGHT ROTATOR CUFF: Primary | ICD-10-CM

## 2024-11-13 DIAGNOSIS — G89.29 CHRONIC RIGHT SHOULDER PAIN: ICD-10-CM

## 2024-11-13 NOTE — PROGRESS NOTES
Physical Therapy Daily Treatment Note      [address] [facility], [address], [place] [address]    Patient: Colette Whitlock   : 1965  Referring practitioner: Makenzie Barnett MD  Date of Initial Visit: Type: THERAPY  Noted: 10/14/2024  Today's Date: 2024  Patient seen for 10 sessions         Visit Diagnoses:     ICD-10-CM ICD-9-CM   1. History of repair of right rotator cuff  Z98.890 V15.29   2. Chronic right shoulder pain  M25.511 719.41    G89.29 338.29         Subjective Evaluation    History of Present Illness    Subjective comment: My shoulder is not painful, just achey in the front.  It definitely feels stronger than when I [returned to PT this time].       Objective   See Exercise, Manual, and Modality Logs for complete treatment.       Assessment & Plan       Assessment  Assessment details: Patient demonstrates steady improvements in functional strength of (R) UE.  She remains unable to fully extend (R) elbow in a fully flexed position of the (R) shoulder but this is notably improved.  Her ability to adequately control eccentric components of most exercises has improved as well, giving evidence of improving strength and neuromuscular control of (R) UE.           Other - reassess for RTMD.          Timed:  Manual Therapy:         mins  97924;  Therapeutic Exercise:    33     mins  33022;     Neuromuscular Doreen:        mins  51373;    Therapeutic Activity:     10     mins  30190;     Gait Training:           mins  85118;     Ultrasound:          mins  49896;    Untimed:  Electrical Stimulation:         mins  52731 (MC );  Mechanical Traction:         mins  22944;   Dry Needling              ___  mins   46368    Timed Treatment:   43   mins   Total Treatment:     43   mins    Louise Toscano PT, DPT, OCS  Physical Therapist  [place] License #[id number]  Electronically signed by: Louise Toscano PT, 24, 2:24 PM EST

## 2024-11-18 ENCOUNTER — TREATMENT (OUTPATIENT)
Dept: PHYSICAL THERAPY | Facility: CLINIC | Age: 59
End: 2024-11-18
Payer: COMMERCIAL

## 2024-11-18 DIAGNOSIS — Z98.890 HISTORY OF REPAIR OF RIGHT ROTATOR CUFF: Primary | ICD-10-CM

## 2024-11-18 DIAGNOSIS — M25.511 CHRONIC RIGHT SHOULDER PAIN: ICD-10-CM

## 2024-11-18 DIAGNOSIS — G89.29 CHRONIC RIGHT SHOULDER PAIN: ICD-10-CM

## 2024-11-18 PROCEDURE — 97530 THERAPEUTIC ACTIVITIES: CPT | Performed by: PHYSICAL THERAPIST

## 2024-11-18 PROCEDURE — 97110 THERAPEUTIC EXERCISES: CPT | Performed by: PHYSICAL THERAPIST

## 2024-11-18 NOTE — PROGRESS NOTES
"Physical Therapy Daily Treatment Note               [address] [facility] [address]                                                                                                                                             [place] [address]    Patient: Colette Whitlock   : 1965  Referring practitioner: Makenzie Barnett MD  Date of Initial Visit: Type: THERAPY  Noted: 10/14/2024  Today's Date: 2024  Patient seen for 11 sessions       Visit Diagnoses:    ICD-10-CM ICD-9-CM   1. History of repair of right rotator cuff  Z98.890 V15.29   2. Chronic right shoulder pain  M25.511 719.41    G89.29 338.29       Subjective Evaluation    History of Present Illness    Subjective comment: Pt denies any pain in (R) shoulder.  \"it's just achey\"       Objective          Active Range of Motion     Right Shoulder   Flexion: 135 degrees   Extension: 40 degrees   Abduction: 110 degrees   External rotation 90°: 55 degrees  Internal rotation 90°: 26 degrees     Strength/Myotome Testing     Right Shoulder     Planes of Motion   Flexion: 4   Extension: 4   Abduction: 4   External rotation at 0°: 4+   Internal rotation at 0°: 4+     Right Elbow   Flexion: 5  Extension: 5      See Exercise, Manual, and Modality Logs for complete treatment.       Assessment & Plan       Assessment  Assessment details: Pt completed treatment with no provocation of (R) shoulder pain. She has been diligent about attending therapy and performing her HEP.  Pt stated that when she feels that her (R) shoulder is stiff she is able to do her stretches to loosen it up.          Timed:         Manual Therapy:    0     mins  85351;     Therapeutic Exercise:    40     mins  89686;     Neuromuscular Doreen:    0    mins  10380;    Therapeutic Activity:     12     mins  55288;     Gait Trainin     mins  25194;     Ultrasound:     0     mins  17870;    E Stim                            0    mins   43436( g0283)  Work Dougherty/Cond      0    mins   " 89093        Timed Treatment:   52   mins   Total Treatment:     52   mins    Joni Michaels, PTA  [place] License: [id number]

## 2024-11-18 NOTE — LETTER
"  Physical Therapy Daily Treatment Note               [address] [facility] [address]                                                                                                                                              [place] [address]    Patient: Colette Whitlock   : 1965  Referring practitioner: Makenzie Barnett MD  Date of Initial Visit: Type: THERAPY  Noted: 10/14/2024  Today's Date: 2024  Patient seen for 11 sessions       Visit Diagnoses:    ICD-10-CM ICD-9-CM   1. History of repair of right rotator cuff  Z98.890 V15.29   2. Chronic right shoulder pain  M25.511 719.41    G89.29 338.29       Subjective Evaluation    History of Present Illness    Subjective comment: Pt denies any pain in (R) shoulder.  \"it's just achey\"       Objective          Active Range of Motion     Right Shoulder   Flexion: 135 degrees   Extension: 40 degrees   Abduction: 110 degrees   External rotation 90°: 55 degrees  Internal rotation 90°: 26 degrees     Strength/Myotome Testing     Right Shoulder     Planes of Motion   Flexion: 4   Extension: 4   Abduction: 4   External rotation at 0°: 4+   Internal rotation at 0°: 4+     Right Elbow   Flexion: 5  Extension: 5      See Exercise, Manual, and Modality Logs for complete treatment.     Assessment & Plan   Assessment  Assessment details: Pt completed treatment with no provocation of (R) shoulder pain. She has been diligent about attending therapy and performing her HEP.  Pt stated that when she feels that her (R) shoulder is stiff she is able to do her stretches to loosen it up.      Joni Michaels, YUE  [place] License: [id number]  "

## 2024-11-19 ENCOUNTER — OFFICE VISIT (OUTPATIENT)
Dept: ORTHOPEDIC SURGERY | Facility: CLINIC | Age: 59
End: 2024-11-19
Payer: COMMERCIAL

## 2024-11-19 VITALS — WEIGHT: 272.2 LBS | BODY MASS INDEX: 46.47 KG/M2 | HEIGHT: 64 IN | TEMPERATURE: 97.8 F

## 2024-11-19 DIAGNOSIS — Z98.890 S/P ROTATOR CUFF REPAIR: Primary | ICD-10-CM

## 2024-11-19 DIAGNOSIS — M75.01 ADHESIVE CAPSULITIS OF RIGHT SHOULDER: ICD-10-CM

## 2024-11-19 PROCEDURE — 99213 OFFICE O/P EST LOW 20 MIN: CPT | Performed by: ORTHOPAEDIC SURGERY

## 2024-11-19 NOTE — LETTER
November 19, 2024     Patient: Colette Whitlock   YOB: 1965   Date of Visit: 11/19/2024       To Whom It May Concern:    It is my medical opinion that Colette Whitlock can work but will continue the same restrictions.  We are planning on manipulating her shoulder in the next week or 2.  She will miss 2 days of work around that manipulation.  I will see her back 2 weeks after that.           Sincerely,        Makenzie Barnett MD    CC: No Recipients

## 2024-11-25 ENCOUNTER — PRE-ADMISSION TESTING (OUTPATIENT)
Dept: PREADMISSION TESTING | Facility: HOSPITAL | Age: 59
End: 2024-11-25
Payer: COMMERCIAL

## 2024-11-25 VITALS
TEMPERATURE: 97.8 F | HEIGHT: 62 IN | SYSTOLIC BLOOD PRESSURE: 123 MMHG | DIASTOLIC BLOOD PRESSURE: 85 MMHG | HEART RATE: 79 BPM | OXYGEN SATURATION: 98 % | RESPIRATION RATE: 20 BRPM | WEIGHT: 270 LBS | BODY MASS INDEX: 49.69 KG/M2

## 2024-11-25 LAB
ANION GAP SERPL CALCULATED.3IONS-SCNC: 9 MMOL/L (ref 5–15)
BUN SERPL-MCNC: 26 MG/DL (ref 6–20)
BUN/CREAT SERPL: 33.8 (ref 7–25)
CALCIUM SPEC-SCNC: 9.4 MG/DL (ref 8.6–10.5)
CHLORIDE SERPL-SCNC: 103 MMOL/L (ref 98–107)
CO2 SERPL-SCNC: 26 MMOL/L (ref 22–29)
CREAT SERPL-MCNC: 0.77 MG/DL (ref 0.57–1)
DEPRECATED RDW RBC AUTO: 44.5 FL (ref 37–54)
EGFRCR SERPLBLD CKD-EPI 2021: 89 ML/MIN/1.73
ERYTHROCYTE [DISTWIDTH] IN BLOOD BY AUTOMATED COUNT: 14.5 % (ref 12.3–15.4)
GLUCOSE SERPL-MCNC: 96 MG/DL (ref 65–99)
HCT VFR BLD AUTO: 42.7 % (ref 34–46.6)
HGB BLD-MCNC: 12.8 G/DL (ref 12–15.9)
MCH RBC QN AUTO: 25.2 PG (ref 26.6–33)
MCHC RBC AUTO-ENTMCNC: 30 G/DL (ref 31.5–35.7)
MCV RBC AUTO: 84.1 FL (ref 79–97)
PLATELET # BLD AUTO: 260 10*3/MM3 (ref 140–450)
PMV BLD AUTO: 11.6 FL (ref 6–12)
POTASSIUM SERPL-SCNC: 4.3 MMOL/L (ref 3.5–5.2)
QT INTERVAL: 400 MS
QTC INTERVAL: 432 MS
RBC # BLD AUTO: 5.08 10*6/MM3 (ref 3.77–5.28)
SODIUM SERPL-SCNC: 138 MMOL/L (ref 136–145)
WBC NRBC COR # BLD AUTO: 7.6 10*3/MM3 (ref 3.4–10.8)

## 2024-11-25 PROCEDURE — 36415 COLL VENOUS BLD VENIPUNCTURE: CPT

## 2024-11-25 PROCEDURE — 93005 ELECTROCARDIOGRAM TRACING: CPT

## 2024-11-25 PROCEDURE — 80048 BASIC METABOLIC PNL TOTAL CA: CPT

## 2024-11-25 PROCEDURE — 85027 COMPLETE CBC AUTOMATED: CPT

## 2024-11-25 NOTE — DISCHARGE INSTRUCTIONS
Take the following medications the morning of surgery:    none    If you are on prescription narcotic pain medication to control your pain you may also take that medication the morning of surgery.      General Instructions:     Do not eat solid food after midnight the night before surgery.  Clear liquids day of surgery are allowed but must be stopped at least two hours before your hospital arrival time.       Allowed clear liquids      Water, sodas, and tea or coffee with no cream or milk added.       12 to 20 ounces of a clear liquid that contains carbohydrates is recommended.  If non-diabetic, have Gatorade or Powerade.  If diabetic, have G2 or Powerade Zero.     Do not have liquids red in color.  Do not consume chicken, beef, pork or vegetable broth or bouillon cubes of any variety as they are not considered clear liquids and are not allowed.      Infants may have breast milk up to four hours before surgery.  Infants drinking formula may drink formula up to six hours before surgery.   Patients who avoid smoking, chewing tobacco and alcohol for 4 weeks prior to surgery have a reduced risk of post-operative complications.  Quit smoking as many days before surgery as you can.  Do not smoke, use chewing tobacco or drink alcohol the day of surgery.   If applicable bring your C-PAP/ BI-PAP machine in with you to preop day of surgery.  Bring any papers given to you in the doctor’s office.  Wear clean comfortable clothes.  Do not wear contact lenses, false eyelashes or make-up.  Bring a case for your glasses.   Bring crutches or walker if applicable.  Remove all piercings.  Leave jewelry and any other valuables at home.  Hair extensions with metal clips must be removed prior to surgery.  The Pre-Admission Testing nurse will instruct you to bring medications if unable to obtain an accurate list in Pre-Admission Testing.        If you were given a blood bank ID arm band remember to bring it with you the day of  surgery.    Preventing a Surgical Site Infection:  For 2 to 3 days before surgery, avoid shaving with a razor because the razor can irritate skin and make it easier to develop an infection.    Any areas of open skin can increase the risk of a post-operative wound infection by allowing bacteria to enter and travel throughout the body.  Notify your surgeon if you have any skin wounds / rashes even if it is not near the expected surgical site.  The area will need assessed to determine if surgery should be delayed until it is healed.  The night prior to surgery shower using a fresh bar of anti-bacterial soap (such as Dial) and clean washcloth.  Sleep in a clean bed with clean clothing.  Do not allow pets to sleep with you.  Shower on the morning of surgery using a fresh bar of anti-bacterial soap (such as Dial) and clean washcloth.  Dry with a clean towel and dress in clean clothing.  Ask your surgeon if you will be receiving antibiotics prior to surgery.  Make sure you, your family, and all healthcare providers clean their hands with soap and water or an alcohol based hand  before caring for you or your wound.    Day of surgery:12/3/2024  Your arrival time is approximately two hours before your scheduled surgery time.  Please note if you have an early arrival time the surgery doors do not open before 5:00 AM.  Upon arrival, a Pre-op nurse and Anesthesiologist will review your health history, obtain vital signs, and answer questions you may have.  The only belongings needed at this time will be a list of your home medications and if applicable your C-PAP/BI-PAP machine.  A Pre-op nurse will start an IV and you may receive medication in preparation for surgery, including something to help you relax.     Please be aware that surgery does come with discomfort.  We want to make every effort to control your discomfort so please discuss any uncontrolled symptoms with your nurse.   Your doctor will most likely have  prescribed pain medications.      If you are going home after surgery you will receive individualized written care instructions before being discharged.  A responsible adult must drive you to and from the hospital on the day of your surgery and ideally stay with you through the night.   .  Discharge prescriptions can be filled by the hospital pharmacy during regular pharmacy hours.  If you are having surgery late in the day/evening your prescription may be e-prescribed to your pharmacy.  Please verify your pharmacy hours or chose a 24 hour pharmacy to avoid not having access to your prescription because your pharmacy has closed for the day.    If you are staying overnight following surgery, you will be transported to your hospital room following the recovery period.  [facility] has all private rooms.    If you have any questions please call Pre-Admission Testing at [phone].  Deductibles and co-payments are collected on the day of service. Please be prepared to pay the required co-pay, deductible or deposit on the day of service as defined by your plan.    Call your surgeon immediately if you experience any of the following symptoms:  Sore Throat  Shortness of Breath or difficulty breathing  Cough  Chills  Body soreness or muscle pain  Headache  Fever  New loss of taste or smell  Do not arrive for your surgery ill.  Your procedure will need to be rescheduled to another time.  You will need to call your physician before the day of surgery to avoid any unnecessary exposure to hospital staff as well as other patients.

## 2024-12-03 ENCOUNTER — APPOINTMENT (OUTPATIENT)
Dept: GENERAL RADIOLOGY | Facility: HOSPITAL | Age: 59
End: 2024-12-03
Payer: COMMERCIAL

## 2024-12-03 ENCOUNTER — HOSPITAL ENCOUNTER (OUTPATIENT)
Facility: HOSPITAL | Age: 59
Setting detail: HOSPITAL OUTPATIENT SURGERY
Discharge: HOME | End: 2024-12-03
Attending: ORTHOPAEDIC SURGERY | Admitting: ORTHOPAEDIC SURGERY
Payer: COMMERCIAL

## 2024-12-03 ENCOUNTER — ANESTHESIA EVENT (OUTPATIENT)
Dept: PERIOP | Facility: HOSPITAL | Age: 59
End: 2024-12-03
Payer: COMMERCIAL

## 2024-12-03 ENCOUNTER — TREATMENT (OUTPATIENT)
Dept: PHYSICAL THERAPY | Facility: CLINIC | Age: 59
End: 2024-12-03
Payer: COMMERCIAL

## 2024-12-03 ENCOUNTER — ANESTHESIA (OUTPATIENT)
Dept: PERIOP | Facility: HOSPITAL | Age: 59
End: 2024-12-03
Payer: COMMERCIAL

## 2024-12-03 VITALS
OXYGEN SATURATION: 94 % | TEMPERATURE: 98.1 F | BODY MASS INDEX: 49.69 KG/M2 | HEART RATE: 86 BPM | HEIGHT: 62 IN | DIASTOLIC BLOOD PRESSURE: 99 MMHG | WEIGHT: 270 LBS | RESPIRATION RATE: 20 BRPM | SYSTOLIC BLOOD PRESSURE: 160 MMHG

## 2024-12-03 DIAGNOSIS — Z98.890 HISTORY OF REPAIR OF RIGHT ROTATOR CUFF: ICD-10-CM

## 2024-12-03 DIAGNOSIS — G89.29 CHRONIC RIGHT SHOULDER PAIN: Primary | ICD-10-CM

## 2024-12-03 DIAGNOSIS — M75.01 ADHESIVE CAPSULITIS OF RIGHT SHOULDER: ICD-10-CM

## 2024-12-03 DIAGNOSIS — M25.511 CHRONIC RIGHT SHOULDER PAIN: Primary | ICD-10-CM

## 2024-12-03 DIAGNOSIS — Z98.890 S/P ROTATOR CUFF REPAIR: ICD-10-CM

## 2024-12-03 PROCEDURE — 25010000002 METHYLPREDNISOLONE PER 80 MG: Performed by: ORTHOPAEDIC SURGERY

## 2024-12-03 PROCEDURE — 25010000002 DEXAMETHASONE PER 1 MG: Performed by: ANESTHESIOLOGY

## 2024-12-03 PROCEDURE — 97140 MANUAL THERAPY 1/> REGIONS: CPT | Performed by: PHYSICAL THERAPIST

## 2024-12-03 PROCEDURE — 73030 X-RAY EXAM OF SHOULDER: CPT

## 2024-12-03 PROCEDURE — 25010000002 PROPOFOL 1000 MG/100ML EMULSION: Performed by: ANESTHESIOLOGY

## 2024-12-03 PROCEDURE — 25010000002 LIDOCAINE PF 2% 2 % SOLUTION: Performed by: ANESTHESIOLOGY

## 2024-12-03 PROCEDURE — 25810000003 LACTATED RINGERS PER 1000 ML: Performed by: ANESTHESIOLOGY

## 2024-12-03 PROCEDURE — 25010000002 ROPIVACAINE PER 1 MG: Performed by: ANESTHESIOLOGY

## 2024-12-03 PROCEDURE — 23700 MNPJ ANES SHO JT FIXJ APRATS: CPT | Performed by: ORTHOPAEDIC SURGERY

## 2024-12-03 PROCEDURE — 25010000002 MIDAZOLAM PER 1 MG: Performed by: ANESTHESIOLOGY

## 2024-12-03 RX ORDER — FAMOTIDINE 10 MG/ML
20 INJECTION, SOLUTION INTRAVENOUS ONCE
Status: COMPLETED | OUTPATIENT
Start: 2024-12-03 | End: 2024-12-03

## 2024-12-03 RX ORDER — SODIUM CHLORIDE 0.9 % (FLUSH) 0.9 %
3-10 SYRINGE (ML) INJECTION AS NEEDED
Status: DISCONTINUED | OUTPATIENT
Start: 2024-12-03 | End: 2024-12-03 | Stop reason: HOSPADM

## 2024-12-03 RX ORDER — LIDOCAINE HYDROCHLORIDE 20 MG/ML
INJECTION, SOLUTION EPIDURAL; INFILTRATION; INTRACAUDAL; PERINEURAL AS NEEDED
Status: DISCONTINUED | OUTPATIENT
Start: 2024-12-03 | End: 2024-12-03 | Stop reason: SURG

## 2024-12-03 RX ORDER — DEXAMETHASONE SODIUM PHOSPHATE 4 MG/ML
INJECTION, SOLUTION INTRA-ARTICULAR; INTRALESIONAL; INTRAMUSCULAR; INTRAVENOUS; SOFT TISSUE
Status: COMPLETED | OUTPATIENT
Start: 2024-12-03 | End: 2024-12-03

## 2024-12-03 RX ORDER — PROPOFOL 10 MG/ML
INJECTION, EMULSION INTRAVENOUS AS NEEDED
Status: DISCONTINUED | OUTPATIENT
Start: 2024-12-03 | End: 2024-12-03 | Stop reason: SURG

## 2024-12-03 RX ORDER — SODIUM CHLORIDE, SODIUM LACTATE, POTASSIUM CHLORIDE, CALCIUM CHLORIDE 600; 310; 30; 20 MG/100ML; MG/100ML; MG/100ML; MG/100ML
9 INJECTION, SOLUTION INTRAVENOUS CONTINUOUS
Status: DISCONTINUED | OUTPATIENT
Start: 2024-12-03 | End: 2024-12-03 | Stop reason: HOSPADM

## 2024-12-03 RX ORDER — SODIUM CHLORIDE 0.9 % (FLUSH) 0.9 %
3 SYRINGE (ML) INJECTION EVERY 12 HOURS SCHEDULED
Status: DISCONTINUED | OUTPATIENT
Start: 2024-12-03 | End: 2024-12-03 | Stop reason: HOSPADM

## 2024-12-03 RX ORDER — MIDAZOLAM HYDROCHLORIDE 1 MG/ML
1 INJECTION, SOLUTION INTRAMUSCULAR; INTRAVENOUS
Status: DISCONTINUED | OUTPATIENT
Start: 2024-12-03 | End: 2024-12-03 | Stop reason: HOSPADM

## 2024-12-03 RX ORDER — ROPIVACAINE HYDROCHLORIDE 5 MG/ML
INJECTION, SOLUTION EPIDURAL; INFILTRATION; PERINEURAL
Status: COMPLETED | OUTPATIENT
Start: 2024-12-03 | End: 2024-12-03

## 2024-12-03 RX ORDER — HYDROCODONE BITARTRATE AND ACETAMINOPHEN 5; 325 MG/1; MG/1
1-2 TABLET ORAL EVERY 4 HOURS PRN
Qty: 40 TABLET | Refills: 0 | Status: SHIPPED | OUTPATIENT
Start: 2024-12-03

## 2024-12-03 RX ORDER — LIDOCAINE HYDROCHLORIDE 10 MG/ML
0.5 INJECTION, SOLUTION INFILTRATION; PERINEURAL ONCE AS NEEDED
Status: DISCONTINUED | OUTPATIENT
Start: 2024-12-03 | End: 2024-12-03 | Stop reason: HOSPADM

## 2024-12-03 RX ADMIN — ROPIVACAINE HYDROCHLORIDE 20 ML: 5 INJECTION EPIDURAL; INFILTRATION; PERINEURAL at 07:09

## 2024-12-03 RX ADMIN — DEXAMETHASONE SODIUM PHOSPHATE 4 MG: 4 INJECTION, SOLUTION INTRA-ARTICULAR; INTRALESIONAL; INTRAMUSCULAR; INTRAVENOUS; SOFT TISSUE at 07:09

## 2024-12-03 RX ADMIN — FAMOTIDINE 20 MG: 10 INJECTION INTRAVENOUS at 06:45

## 2024-12-03 RX ADMIN — MIDAZOLAM 1 MG: 1 INJECTION INTRAMUSCULAR; INTRAVENOUS at 07:01

## 2024-12-03 RX ADMIN — SODIUM CHLORIDE, POTASSIUM CHLORIDE, SODIUM LACTATE AND CALCIUM CHLORIDE 9 ML/HR: 600; 310; 30; 20 INJECTION, SOLUTION INTRAVENOUS at 06:37

## 2024-12-03 RX ADMIN — PROPOFOL INJECTABLE EMULSION 100 MG: 10 INJECTION, EMULSION INTRAVENOUS at 08:19

## 2024-12-03 RX ADMIN — LIDOCAINE HYDROCHLORIDE 3 ML: 20 INJECTION, SOLUTION EPIDURAL; INFILTRATION; INTRACAUDAL; PERINEURAL at 08:19

## 2024-12-03 NOTE — ANESTHESIA POSTPROCEDURE EVALUATION
Patient: Colette Whitlock    Procedure Summary       Date: 12/03/24 Room / Location:  [facility] [address] [place] [address] [facility]    Anesthesia Start: 0815 Anesthesia Stop: 0827    Procedure: SHOULDER MANIPULATION with steroid injection (Right) Diagnosis:       S/P rotator cuff repair      Adhesive capsulitis of right shoulder      (S/P rotator cuff repair [Z98.890])      (Adhesive capsulitis of right shoulder [M75.01])    Surgeons: Makenzie Barnett MD Provider: Sharmila Porter MD    Anesthesia Type: regional ASA Status: 3            Anesthesia Type: regional    Vitals  Vitals Value Taken Time   /99 12/03/24 0845   Temp     Pulse 88 12/03/24 0853   Resp 20 12/03/24 0839   SpO2 90 % 12/03/24 0853   Vitals shown include unfiled device data.        Post Anesthesia Care and Evaluation    Patient location during evaluation: bedside  Patient participation: complete - patient participated  Level of consciousness: awake and alert  Pain management: adequate    Airway patency: patent  Anesthetic complications: No anesthetic complications  PONV Status: controlled  Cardiovascular status: acceptable  Respiratory status: acceptable  Hydration status: acceptable

## 2024-12-03 NOTE — ANESTHESIA PROCEDURE NOTES
Interscalene nerve block      Patient reassessed immediately prior to procedure    Patient location during procedure: pre-op  Start time: 12/3/2024 7:05 AM  Stop time: 12/3/2024 7:09 AM  Reason for block: at surgeon's request and primary anesthetic  Performed by  Anesthesiologist: Sharmila Porter MD  Preanesthetic Checklist  Completed: patient identified, IV checked, site marked, risks and benefits discussed, surgical consent, monitors and equipment checked, pre-op evaluation and timeout performed  Prep:  Pt Position: sitting  Sterile barriers:cap, gloves and mask  Prep: ChloraPrep  Patient monitoring: blood pressure monitoring, continuous pulse oximetry and EKG  Procedure    Sedation: yes  Performed under: local infiltration  Guidance:ultrasound guided    ULTRASOUND INTERPRETATION.  Using ultrasound guidance a 22 G gauge needle was placed in close proximity to the brachial plexus nerve, at which point, under ultrasound guidance anesthetic was injected in the area of the nerve and spread of the anesthesia was seen on ultrasound in close proximity thereto.  There were no abnormalities seen on ultrasound; a digital image was taken; and the patient tolerated the procedure with no complications. Images:still images obtained, printed/placed on chart    Laterality:right  Block Type:interscalene  Injection Technique:single-shot  Needle Type:short-bevel and echogenic  Needle Gauge:22 G  Resistance on Injection: none    Medications Used: dexamethasone (DECADRON) injection - Injection   4 mg - 12/3/2024 7:09:00 AM  ropivacaine (NAROPIN) 0.5 % injection - Injection   20 mL - 12/3/2024 7:09:00 AM      Medications  Comment:Ultrasound Interpretation:  Using ultrasound guidance the needle was placed in close proximity to the target nerve and anesthetic was injected in the area of the target nerve and/or bundles, and spread of the anesthetic was seen on ultrasound in close proximity thereto.  There were no abnormalities seen  on ultrasound; a digital image was taken; and the patient tolerated the procedure with no complications.    Block placed for postoperative pain control per surgeon request.    Post Assessment  Injection Assessment: negative aspiration for heme, no paresthesia on injection and incremental injection  Patient Tolerance:comfortable throughout block  Complications:no  Performed by: Sharmila Porter MD

## 2024-12-03 NOTE — OP NOTE
Operative Note      Facility: [facility]  Patient Name: Colette Whitlock  YOB: 1965  Date: 12/3/2024  Medical Record Number: [id number]      Pre-op Diagnosis: Adhesive capsulitis      Post-op Diagnosis:   Same        Procedure(s):   Right SHOULDER MANIPULATION with steroid injection    Surgeon(s):  Makenzie Barnett MD    Anesthesia: General with Block  Anesthesiologist: Sharmila Porter MD  CRNA: Otoniel Buck CRNA    Staff:   * No surgical staff found *  Assistants :   none    Estimated Blood Loss:none    Specimens:  none    Drains: None    Findings: See Dictation    Complications: None    Indication for procedure:  This pt has a  several month history of right shoulder pain and loss of range of motion.  They are felt to have adhesive capsulitis and presents for manipulation.  They understand the inherent risk of fracture, dislocation and need for future intervention.      Description of procedure.  Patient was taken to the preop holding room.  After induction of adequate scalene nerve block and IV sedation, the patient underwent exam under anesthesia with symmetric and marked limitation of range of motion.  They then underwent gentle manipulation of adhesions with return of full range of motion flexion 180 abduction 180 external rotation to 80 and internal rotation to 60. The patient was then injected sterilely with  Marcaine and Depo-Medrol.  They tolerated this well,  sling was placed and the patient will start physical therapy today.

## 2024-12-03 NOTE — H&P
History & Physical       Patient: Colette Whitlock    Date of Admission: 12/3/2024  5:36 AM    YOB: 1965    Medical Record Number: [id number]    Attending Physician: Makenzie Barnett MD        Chief Complaints: S/P rotator cuff repair [Z98.890]  Adhesive capsulitis of right shoulder [M75.01]      History of Present Illness: This patient is status post rotator cuff repair she is really done well with regard to her strength she is minding her limitations however she has had some persistent loss of range of motion that she has been unable to improve with conservative measures she presents for manipulation she understands the inherent risk of fracture dislocation     Allergies:   Allergies   Allergen Reactions    Penicillin G Unknown (See Comments)     Told this as a child       Medications:   Home Medications:  No current facility-administered medications on file prior to encounter.     Current Outpatient Medications on File Prior to Encounter   Medication Sig    acetaminophen (TYLENOL) 500 MG tablet Take 1 tablet by mouth Every 6 (Six) Hours As Needed for Mild Pain.    cetirizine (zyrTEC) 10 MG tablet Take 1 tablet by mouth Daily. (Patient taking differently: Take 1 tablet by mouth Daily As Needed.)    fluticasone (FLONASE) 50 MCG/ACT nasal spray 2 sprays into the nostril(s) as directed by provider Daily. (Patient taking differently: Administer 2 sprays into the nostril(s) as directed by provider Daily As Needed.)    ketotifen (ZADITOR) 0.025 % ophthalmic solution Apply 1 drop to eye(s) as directed by provider Every 12 (Twelve) Hours As Needed.    meloxicam (MOBIC) 15 MG tablet Take 1 tablet by mouth Daily.    montelukast (SINGULAIR) 10 MG tablet Take 1 tablet by mouth every night at bedtime. (Patient taking differently: Take 1 tablet by mouth Daily As Needed.)     Current Medications:  Scheduled Meds:sodium chloride, 3 mL, Intravenous, Q12H      Continuous Infusions:lactated ringers, 9 mL/hr, Last  "Rate: 9 mL/hr (24 0637)      PRN Meds:.  lidocaine    midazolam    sodium chloride    Past Medical History:   Diagnosis Date    Arthritis     Frozen shoulder     Prediabetes     Right shoulder pain     Seasonal allergies     Torn rotator cuff     RIGHT        Past Surgical History:   Procedure Laterality Date    ANKLE OPEN REDUCTION INTERNAL FIXATION Right     X2     SECTION      X1    COLONOSCOPY      ENDOSCOPY      SHOULDER ARTHROSCOPY W/ ROTATOR CUFF REPAIR Right 2024    Procedure: SHOULDER ARTHROSCOPY LABERAL DEBRIDEMENT, DECOMPRESSION, WITH WITH  MINI OPEN ROTATOR CUFF REPAIR;  Surgeon: Makenzie Barnett MD;  Location: [facility];  Service: Orthopedics;  Laterality: Right;        Social History     Occupational History    Not on file   Tobacco Use    Smoking status: Never    Smokeless tobacco: Never   Vaping Use    Vaping status: Never Used   Substance and Sexual Activity    Alcohol use: Not Currently    Drug use: Never    Sexual activity: Yes     Partners: Male     Comment: , three children      Social History     Social History Narrative    Not on file        Family History   Problem Relation Age of Onset    No Known Problems Mother     No Known Problems Father     Breast cancer Neg Hx     Colon cancer Neg Hx     Malig Hyperthermia Neg Hx        Review of Systems      Physical Exam: 59 y.o. female  General Appearance:    Alert, cooperative, in no acute distress                      Vitals:    24 0553   BP: 169/73   BP Location: Right arm   Patient Position: Sitting   Pulse: 90   Resp: 20   Temp: 98.1 °F (36.7 °C)   TempSrc: Oral   SpO2: 98%   Weight: 122 kg (270 lb)   Height: 157.5 cm (62.01\")        Head:  Normocephalic, without obvious abnormality, atraumatic   Eyes:          Conjunctivae and sclerae normal, no pallor, corneas clear,    Ears:  Ears appear intact with no abnormalities noted   Throat: No oral lesions, no thrush, oral mucosa moist   Neck: No adenopathy, " supple, trachea midline, no thyromegaly,    Back:   No kyphosis present, no scoliosis present, no skin lesions,      erythema or scars, no tenderness to percussion or                   palpation,range of motion normal   Lungs:   C respirations regular, even and                 unlabored    Heart:  Regular rhythm and normal rate               Chest Wall:  No abnormalities observed               Pulses: Pulses palpable and equal bilaterally   Skin: No bleeding, bruising or rash   Lymph nodes: No palpable adenopathy   Neurologic: Appears neurologic intact             Assessment:    S/P rotator cuff repair    Adhesive capsulitis of right shoulder          Plan: All risks, benefits and alternatives were discussed.  Risks including but not exclusive to anesthetic complications, including death, MI, CVA, infection, bleeding DVT, PE,  fracture, residual pain and need for future surgery.  Patient understood all and agrees to proceed.

## 2024-12-03 NOTE — PROGRESS NOTES
Re-Assessment / Re-Certification  [facility]  [address], [address], [place] [id number]      Patient: Colette Whitlock   : 1965  Diagnosis/ICD-10 Code:  Chronic right shoulder pain [M25.511, G89.29]  Referring practitioner: Makenzie Barnett MD  Date of Initial Visit: 12/3/2024  Today's Date: 12/3/2024        Subjective:   Clinical Progress: improved  Home Program Compliance: Yes  Treatment has included: therapeutic exercise, neuromuscular re-education, manual therapy, and therapeutic activity    Subjective Evaluation    History of Present Illness    Subjective comment: Patient presents to PT today following surgical AYANNA of (R) glenohumeral joint.  She is wearing a (R) UE sling and states she does not yet have return of any (R) UE sensation.     Objective          Passive Range of Motion     Right Shoulder   Flexion: 175 degrees   Abduction: 180 degrees   External rotation 90°: 85 degrees   Internal rotation 90°: 65 degrees     Right Elbow   Flexion: 150 degrees   Extension: 0 degrees   Forearm supination: WFL  Forearm pronation: WFL    Additional Passive Range of Motion Details  Patient describes mild, dull pain with end range (R) shoulder PROM flexion and lesser discomfort with end range ER      Assessment & Plan       Assessment  Assessment details: Patient presents to PT today in (R) UE sling immediately following surgical AYANNA of (R) shoulder.  She reports no return of (R) UE sensation as yet and as a result, no current pain.  She demonstrates good quantity and quality of (R) shoulder PROM all planes.  AROM not assessed yet as patient unable to actively control (R) UE.  New goals are established to maintain current (R) shoulder PROM all planes and further improve (R) shoulder AROM and strength without compensation.  She will benefit from continued skilled PT services to address these deficits in order to optimize patient's functional recovery s/p (R) glenohumeral  AYANNA.    Goals  Plan Goals: NEW GOALS ESTABLISHED TODAY 12/03/2024:    STGs: to be met in 4 weeks  1. Patient will be independent with initial HEP  2. Patient will consistently report minimal to no (R) UE symptoms with ADLs  3. Patient will maintain (R) shoulder PROM flexion 175 degrees, abduction 180 degrees, ER 85 degrees, and IR 65 degrees for sufficient joint mobility to perform ADLs without compensation   4. Patient will consistently demonstrate 50% reduction in trunk compensation during (R) shoulder elevation    LTGs: to be met in 8 weeks  1. Patient will be independent with progressed HEP  2. Patient will demonstrate improved (R) shoulder AROM flexion >/= 150 degrees, abduction >/= 140 degrees, and functional IR and ER behind the back and head, respectively, for improved functional reaching ability  3. Patient will demonstrate improved (R) shoulder strength grossly 4+/5 for improved functional use of surgical (R) UE  4. Patient will consistently perform all ADLs and work tasks without compensatory patterns or pain      Progress toward previous goals: Not Met  Recommendations: Continue as planned  Timeframe: 2 months  Prognosis to achieve goals: good    PT Signature: Louise Toscano, PT, DPT, OCS  [place] License # [id number]      Based upon review of the patient's progress and continued therapy plan, it is my medical opinion that Colette Whitlock should continue physical therapy treatment at [facility]  [address] [place] [id number] [phone].    Signature: __________________________________  Makenzie Barnett MD    Manual Therapy:    28     mins  01790;  Therapeutic Exercise:         mins  19223;     Neuromuscular Doreen:        mins  07131;    Therapeutic Activity:          mins  91478;     Gait Training:           mins  91264;     Ultrasound:          mins  11922;    Electrical Stimulation:         mins  13074 ( );  Dry Needling          mins  self-pay    Timed Treatment:   28   mins   Total Treatment:     28   mins    Please sign and return via fax to [phone]. Thank you, [facility].

## 2024-12-03 NOTE — ANESTHESIA PREPROCEDURE EVALUATION
" Anesthesia Evaluation     Patient summary reviewed and Nursing notes reviewed   no history of anesthetic complications:   NPO Solid Status: > 8 hours  NPO Liquid Status: > 2 hours           Airway   Mallampati: II  TM distance: >3 FB  Neck ROM: full  No difficulty expected  Dental          Pulmonary - normal exam   Cardiovascular - normal exam    ECG reviewed    (+) hyperlipidemia      Neuro/Psych  GI/Hepatic/Renal/Endo    (+) obesity, morbid obesity, GERD, diabetes mellitus (pre)    Musculoskeletal     Abdominal    Substance History      OB/GYN          Other   arthritis,     ROS/Med Hx Other: Grade IIa view    Difficult IV                Anesthesia Plan    ASA 3     regional     (Regional with propofol sedation    I have reviewed the patient's history and chart with the patient, including all pertinent laboratory results and imaging. I have explained the risks of anesthesia including but not limited to dental damage, corneal abrasion, nerve injury, MI, stroke, aspiration, and death. Patient has agreed to proceed.      /73 (BP Location: Right arm, Patient Position: Sitting)   Pulse 90   Temp 36.7 °C (98.1 °F) (Oral)   Resp 20   Ht 157.5 cm (62.01\")   Wt 122 kg (270 lb)   SpO2 98%   BMI 49.37 kg/m²   )  intravenous induction     Anesthetic plan, risks, benefits, and alternatives have been provided, discussed and informed consent has been obtained with: patient.    CODE STATUS:         "

## 2024-12-04 ENCOUNTER — TREATMENT (OUTPATIENT)
Dept: PHYSICAL THERAPY | Facility: CLINIC | Age: 59
End: 2024-12-04
Payer: COMMERCIAL

## 2024-12-04 DIAGNOSIS — M25.511 CHRONIC RIGHT SHOULDER PAIN: Primary | ICD-10-CM

## 2024-12-04 DIAGNOSIS — Z98.890 HISTORY OF REPAIR OF RIGHT ROTATOR CUFF: ICD-10-CM

## 2024-12-04 DIAGNOSIS — G89.29 CHRONIC RIGHT SHOULDER PAIN: Primary | ICD-10-CM

## 2024-12-04 PROCEDURE — 97140 MANUAL THERAPY 1/> REGIONS: CPT | Performed by: PHYSICAL THERAPIST

## 2024-12-04 NOTE — PROGRESS NOTES
Physical Therapy Daily Treatment Note               [address]                                                                                                                                             [place] [address]    Patient: Colette Whitlock   : 1965  Referring practitioner: Makenzie Barnett MD  Date of Initial Visit: Type: THERAPY  Noted: 10/14/2024  Today's Date: 2024  Patient seen for 13 sessions       Visit Diagnoses:    ICD-10-CM ICD-9-CM   1. Chronic right shoulder pain  M25.511 719.41    G89.29 338.29   2. History of repair of right rotator cuff  Z98.890 V15.29       Subjective Evaluation    History of Present Illness    Subjective comment: Pt reports that she has not been able to sleep since yesterday.  Sensation has returned to (R) hand, but not (R) shoulder.       Objective          Passive Range of Motion     Right Shoulder   Flexion: 154 degrees   Abduction: 161 degrees   External rotation 90°: 81 degrees   Internal rotation 90°: 50 degrees       See Exercise, Manual, and Modality Logs for complete treatment.       Assessment & Plan       Assessment  Assessment details: Pt completed treatment with mild c/o pain in (R) shoulder.  Pt had c/o pain when returning to neutral during PROM stretching.  She benefited from vc to push into therapists hand as her arm came down. She had less pain with this .  Pt still lacks control of her (R) UE, so treatment was limited to PROM today.          Timed:         Manual Therapy:    25     mins  01525;     Therapeutic Exercise:    0     mins  10712;     Neuromuscular Doreen:    0    mins  16316;    Therapeutic Activity:     0     mins  45800;     Gait Trainin     mins  74793;     Ultrasound:     0     mins  75005;    E Stim                            0    mins   32289( g0283)  Work Dougherty/Cond      0    mins   93392        Timed Treatment:   25   mins   Total Treatment:     31   mins    Joni Michaels PTA  [place]  License: [id number]

## 2024-12-05 ENCOUNTER — TREATMENT (OUTPATIENT)
Dept: PHYSICAL THERAPY | Facility: CLINIC | Age: 59
End: 2024-12-05
Payer: COMMERCIAL

## 2024-12-05 DIAGNOSIS — G89.29 CHRONIC RIGHT SHOULDER PAIN: Primary | ICD-10-CM

## 2024-12-05 DIAGNOSIS — M25.511 CHRONIC RIGHT SHOULDER PAIN: Primary | ICD-10-CM

## 2024-12-05 DIAGNOSIS — Z98.890 HISTORY OF REPAIR OF RIGHT ROTATOR CUFF: ICD-10-CM

## 2024-12-05 PROCEDURE — 97140 MANUAL THERAPY 1/> REGIONS: CPT | Performed by: PHYSICAL THERAPIST

## 2024-12-05 NOTE — PROGRESS NOTES
Physical Therapy Daily Treatment Note               [address]                                                                                                                                             [place] [address]    Patient: Colette Whitlock   : 1965  Referring practitioner: Makenzie Barnett MD  Date of Initial Visit: Type: THERAPY  Noted: 10/14/2024  Today's Date: 2024  Patient seen for 14 sessions       Visit Diagnoses:    ICD-10-CM ICD-9-CM   1. Chronic right shoulder pain  M25.511 719.41    G89.29 338.29   2. History of repair of right rotator cuff  Z98.890 V15.29       Subjective Evaluation    History of Present Illness    Subjective comment: Pt started back to work today on light duty.  Pt came to therapy after work.       Objective   See Exercise, Manual, and Modality Logs for complete treatment.       Assessment & Plan       Assessment  Assessment details: Pt tolerated manual PROM stretching with over pressure with moderate visible/c/o pain.  Pt's PROM seems to have improved since yesterday, but remains tight.  Plan to progress to [facility] next treatment.          Timed:         Manual Therapy:    26     mins  41590;     Therapeutic Exercise:    0     mins  80493;     Neuromuscular Doreen:    0    mins  74881;    Therapeutic Activity:     0     mins  51152;     Gait Trainin     mins  84016;     Ultrasound:     0     mins  89512;    E Stim                            00    mins   26171( g0283)  Work Dougherty/Cond      0    mins   09042        Timed Treatment:   26   mins   Total Treatment:     30   mins    Joni Michaels PTA  [place] License: [id number]   You can access the FollowMyHealth Patient Portal offered by [facility] by registering at the following website: [email or web address]. By joining [email or web address]’s FollowMyHealth portal, you will also be able to view your health information using other applications (apps) compatible with our system.

## 2024-12-06 ENCOUNTER — TREATMENT (OUTPATIENT)
Dept: PHYSICAL THERAPY | Facility: CLINIC | Age: 59
End: 2024-12-06
Payer: COMMERCIAL

## 2024-12-06 DIAGNOSIS — M25.511 CHRONIC RIGHT SHOULDER PAIN: Primary | ICD-10-CM

## 2024-12-06 DIAGNOSIS — Z98.890 HISTORY OF REPAIR OF RIGHT ROTATOR CUFF: ICD-10-CM

## 2024-12-06 DIAGNOSIS — G89.29 CHRONIC RIGHT SHOULDER PAIN: Primary | ICD-10-CM

## 2024-12-06 PROCEDURE — 97140 MANUAL THERAPY 1/> REGIONS: CPT | Performed by: PHYSICAL THERAPIST

## 2024-12-06 PROCEDURE — 97110 THERAPEUTIC EXERCISES: CPT | Performed by: PHYSICAL THERAPIST

## 2024-12-06 NOTE — PROGRESS NOTES
"Physical Therapy Daily Treatment Note               [address]                                                                                                                                             [place] [address]    Patient: Colette Whitlock   : 1965  Referring practitioner: Makenzie Barnett MD  Date of Initial Visit: Type: THERAPY  Noted: 10/14/2024  Today's Date: 2024  Patient seen for 15 sessions       Visit Diagnoses:    ICD-10-CM ICD-9-CM   1. Chronic right shoulder pain  M25.511 719.41    G89.29 338.29   2. History of repair of right rotator cuff  Z98.890 V15.29       Subjective Evaluation    History of Present Illness    Subjective comment: Pt reports that her (R) shoulder felt \"wonderful\" after yesterdays session.       Objective   See Exercise, Manual, and Modality Logs for complete treatment.       Assessment & Plan       Assessment  Assessment details: Pt completed treatment with mild visible signs of pain in (R) shoulder. She continues to tolerate PROM manual stretching well. She was able to initiate AAROM with pulleys and table slides today.  Will continue to progress per POC.          Timed:         Manual Therapy:    24     mins  77659;     Therapeutic Exercise:    16     mins  72156;     Neuromuscular Doreen:    0    mins  06315;    Therapeutic Activity:     0     mins  47772;     Gait Trainin     mins  54627;     Ultrasound:     0     mins  24100;    E Stim                            0    mins   04978( g0283)  Work Dougherty/Cond      0    mins   98934        Timed Treatment:   40   mins   Total Treatment:     40   mins    Joni Michaels PTA  [place] License: [id number]  "

## 2024-12-09 ENCOUNTER — TREATMENT (OUTPATIENT)
Dept: PHYSICAL THERAPY | Facility: CLINIC | Age: 59
End: 2024-12-09
Payer: COMMERCIAL

## 2024-12-09 DIAGNOSIS — G89.29 CHRONIC RIGHT SHOULDER PAIN: Primary | ICD-10-CM

## 2024-12-09 DIAGNOSIS — M25.511 CHRONIC RIGHT SHOULDER PAIN: Primary | ICD-10-CM

## 2024-12-09 DIAGNOSIS — Z98.890 HISTORY OF REPAIR OF RIGHT ROTATOR CUFF: ICD-10-CM

## 2024-12-09 PROCEDURE — 97110 THERAPEUTIC EXERCISES: CPT | Performed by: PHYSICAL THERAPIST

## 2024-12-09 PROCEDURE — 97140 MANUAL THERAPY 1/> REGIONS: CPT | Performed by: PHYSICAL THERAPIST

## 2024-12-09 NOTE — PROGRESS NOTES
Physical Therapy Daily Treatment Note               [address]                                                                                                                                             [place] [address]    Patient: Colette Whitlock   : 1965  Referring practitioner: Makenzie Barnett MD  Date of Initial Visit: Type: THERAPY  Noted: 10/14/2024  Today's Date: 2024  Patient seen for 16 sessions       Visit Diagnoses:    ICD-10-CM ICD-9-CM   1. Chronic right shoulder pain  M25.511 719.41    G89.29 338.29   2. History of repair of right rotator cuff  Z98.890 V15.29       Subjective Evaluation    History of Present Illness    Subjective comment: Pt denies any current (R) shoulder pain.       Objective   See Exercise, Manual, and Modality Logs for complete treatment.       Assessment & Plan       Assessment  Assessment details: Pt continues to tolerate treatment well. Pt's PROM was less tight today than last treatment.  She was able to progress to AAROM wall walks with no issues.   Plan to progress AAROM and AROM as tolerated.          Timed:         Manual Therapy:    24     mins  78343;     Therapeutic Exercise:    31     mins  73462;     Neuromuscular Doreen:    0    mins  82387;    Therapeutic Activity:     0     mins  82897;     Gait Trainin     mins  40609;     Ultrasound:     0     mins  39520;    E Stim                            0    mins   57058( g0283)  Work Dougherty/Cond      0    mins   09228        Timed Treatment:   55   mins   Total Treatment:     55   mins    Joni Michaels PTA  [place] License: [id number]

## 2024-12-10 ENCOUNTER — TREATMENT (OUTPATIENT)
Dept: PHYSICAL THERAPY | Facility: CLINIC | Age: 59
End: 2024-12-10
Payer: COMMERCIAL

## 2024-12-10 DIAGNOSIS — M25.511 CHRONIC RIGHT SHOULDER PAIN: Primary | ICD-10-CM

## 2024-12-10 DIAGNOSIS — G89.29 CHRONIC RIGHT SHOULDER PAIN: Primary | ICD-10-CM

## 2024-12-10 DIAGNOSIS — Z98.890 HISTORY OF REPAIR OF RIGHT ROTATOR CUFF: ICD-10-CM

## 2024-12-10 PROCEDURE — 97140 MANUAL THERAPY 1/> REGIONS: CPT | Performed by: PHYSICAL THERAPIST

## 2024-12-10 PROCEDURE — 97110 THERAPEUTIC EXERCISES: CPT | Performed by: PHYSICAL THERAPIST

## 2024-12-10 NOTE — PROGRESS NOTES
Physical Therapy Daily Treatment Note      [address] [facility], [address], [place] [address]    Patient: Colette Whitlock   : 1965  Referring practitioner: Makenzie Barnett MD  Date of Initial Visit: Type: THERAPY  Noted: 10/14/2024  Today's Date: 12/10/2024  Patient seen for 17 sessions         Visit Diagnoses:     ICD-10-CM ICD-9-CM   1. Chronic right shoulder pain  M25.511 719.41    G89.29 338.29   2. 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  I think the [ROM] is doing good though.       Objective   See Exercise, Manual, and Modality Logs for complete treatment.   *Progressed shoulder AAROM and AROM activities    Assessment & Plan       Assessment  Assessment details: Patient did take a Tylenol about one hour prior to PT today and does have improved tolerance to more aggressive (R) shoulder manual therapy techniques as well as exercises which are typically more painful for her such as wall walks.  AROM exercises for the (R) shoulder are re-introduced to reinforce PROM and AAROM of the (R) shoulder.  Moderate tendency toward trunk compensation during (R) UE elevation persists for which patient benefits from verbal and tactile cueing to minimize.          Progress per Plan of Care         Timed:  Manual Therapy:    17     mins  93906;  Therapeutic Exercise:    27     mins  76226;     Neuromuscular Doreen:        mins  02121;    Therapeutic Activity:          mins  43769;     Gait Training:           mins  40004;     Ultrasound:          mins  39115;    Untimed:  Electrical Stimulation:         mins  23534 ( );  Mechanical Traction:         mins  73784;   Dry Needling              ___  mins   18773    Timed Treatment:   44   mins   Total Treatment:     44   mins    Louise Toscano PT, DPT, OCS  Physical Therapist  [place]  License #[id number]  Electronically signed by: Louise Toscano PT, 12/10/24, 1:32 PM EST

## 2024-12-12 NOTE — PROGRESS NOTES
Shoulder manipulation follow up  Patient: Colette Whitlock        YOB: 1965      Chief Complaints: shoulder pain right      History of Present Illness: Pt is here f/u shoulder manipulation.  On the right she is also status post rotator cuff repair she is doing great much better after the manipulation she is working with some restrictions and doing great  Allergies:   Allergies   Allergen Reactions    Penicillin G Unknown (See Comments)     Told this as a child       Medications:   Home Medications:  Current Outpatient Medications on File Prior to Visit   Medication Sig    acetaminophen (TYLENOL) 500 MG tablet Take 1 tablet by mouth Every 6 (Six) Hours As Needed for Mild Pain.    cetirizine (zyrTEC) 10 MG tablet Take 1 tablet by mouth Daily. (Patient taking differently: Take 1 tablet by mouth Daily As Needed.)    fluticasone (FLONASE) 50 MCG/ACT nasal spray 2 sprays into the nostril(s) as directed by provider Daily. (Patient taking differently: Administer 2 sprays into the nostril(s) as directed by provider Daily As Needed.)    HYDROcodone-acetaminophen (NORCO) 5-325 MG per tablet Take 1-2 tablets by mouth Every 4 (Four) Hours As Needed (Pain).    ketotifen (ZADITOR) 0.025 % ophthalmic solution Apply 1 drop to eye(s) as directed by provider Every 12 (Twelve) Hours As Needed.    meloxicam (MOBIC) 15 MG tablet Take 1 tablet by mouth Daily.    montelukast (SINGULAIR) 10 MG tablet Take 1 tablet by mouth every night at bedtime. (Patient taking differently: Take 1 tablet by mouth Daily As Needed.)    NON FORMULARY Apply  topically to the appropriate area as directed As Needed (pain relief). Menthyl     No current facility-administered medications on file prior to visit.     Current Medications:  Scheduled Meds:  Continuous Infusions:No current facility-administered medications for this visit.    PRN Meds:.          Physical Exam: 59 y.o. female  General Appearance:    Alert, cooperative, in no acute  distress                 There were no vitals filed for this visit.   Patient is alert and oriented ×3 no acute distress normal mood physical exam.  Physical exam of the shoulder, incisions looked good there is no erythema,no signs or sx of infection.  Exam of her right shoulder she can active flex to 165 passively I can easily get her to 180 much better than before external rotation to 65 rotator cuff strength 4+ to 5/5  Assessment  S/P shoulder manipulation I think she is doing well I think her range of motion is much improved I think she will get stronger as the days go and continue to improve we will decrease her restrictions a little bit for the next 6 weeks I will see her back 6 weeks we did call her daughter got her daughter on the phone and relayed all of the above      Plan: To continue physical therapy, continue working on range of motion and strength

## 2024-12-13 ENCOUNTER — TREATMENT (OUTPATIENT)
Dept: PHYSICAL THERAPY | Facility: CLINIC | Age: 59
End: 2024-12-13
Payer: COMMERCIAL

## 2024-12-13 DIAGNOSIS — G89.29 CHRONIC RIGHT SHOULDER PAIN: Primary | ICD-10-CM

## 2024-12-13 DIAGNOSIS — M25.511 CHRONIC RIGHT SHOULDER PAIN: Primary | ICD-10-CM

## 2024-12-13 DIAGNOSIS — Z98.890 HISTORY OF REPAIR OF RIGHT ROTATOR CUFF: ICD-10-CM

## 2024-12-13 PROCEDURE — 97140 MANUAL THERAPY 1/> REGIONS: CPT | Performed by: PHYSICAL THERAPIST

## 2024-12-13 PROCEDURE — 97110 THERAPEUTIC EXERCISES: CPT | Performed by: PHYSICAL THERAPIST

## 2024-12-13 NOTE — PROGRESS NOTES
Physical Therapy Daily Treatment Note               [address] [facility] [address]                                                                                                                                             [place] [address]    Patient: Colette Whitlock   : 1965  Referring practitioner: Makenzie Barnett MD  Date of Initial Visit: Type: THERAPY  Noted: 10/14/2024  Today's Date: 2024  Patient seen for 18 sessions       Visit Diagnoses:    ICD-10-CM ICD-9-CM   1. Chronic right shoulder pain  M25.511 719.41    G89.29 338.29   2. History of repair of right rotator cuff  Z98.890 V15.29       Subjective Evaluation    History of Present Illness    Subjective comment: Pt denies any pain in (R) shoulder.  Just feels tight in anterior.       Objective   See Exercise, Manual, and Modality Logs for complete treatment.       Assessment & Plan       Assessment  Assessment details: Pt completed treatment with no c/o pain in (R) shoulder.  Pt continues to tolerate Manual PROM stretching well.  She was able to  increase reps with her AROM exercises. Plan to progress per POC.          Timed:         Manual Therapy:    15     mins  32178;     Therapeutic Exercise:    30     mins  11340;     Neuromuscular Doreen:    0    mins  03954;    Therapeutic Activity:     0     mins  76523;     Gait Trainin     mins  45167;     Ultrasound:     0     mins  93932;    E Stim                            0    mins   94955( g0283)  Work Dougherty/Cond      0    mins   18137        Timed Treatment:   45   mins   Total Treatment:     45   mins    Joni Michaels PTA  [place] License: [id number]

## 2024-12-16 ENCOUNTER — TREATMENT (OUTPATIENT)
Dept: PHYSICAL THERAPY | Facility: CLINIC | Age: 59
End: 2024-12-16
Payer: COMMERCIAL

## 2024-12-16 DIAGNOSIS — Z98.890 HISTORY OF REPAIR OF RIGHT ROTATOR CUFF: ICD-10-CM

## 2024-12-16 DIAGNOSIS — M25.511 CHRONIC RIGHT SHOULDER PAIN: Primary | ICD-10-CM

## 2024-12-16 DIAGNOSIS — G89.29 CHRONIC RIGHT SHOULDER PAIN: Primary | ICD-10-CM

## 2024-12-16 PROCEDURE — 97110 THERAPEUTIC EXERCISES: CPT | Performed by: PHYSICAL THERAPIST

## 2024-12-16 PROCEDURE — 97140 MANUAL THERAPY 1/> REGIONS: CPT | Performed by: PHYSICAL THERAPIST

## 2024-12-16 NOTE — PROGRESS NOTES
Physical Therapy Daily Treatment Note               [address] [facility] [address]                                                                                                                                             [place] [address]    Patient: Colette Whitlock   : 1965  Referring practitioner: Makenzie Barnett MD  Date of Initial Visit: Type: THERAPY  Noted: 10/14/2024  Today's Date: 2024  Patient seen for 19 sessions       Visit Diagnoses:    ICD-10-CM ICD-9-CM   1. Chronic right shoulder pain  M25.511 719.41    G89.29 338.29   2. History of repair of right rotator cuff  Z98.890 V15.29       Subjective Evaluation    History of Present Illness    Subjective comment: Pt reports that her shoulder is feeling less tight.       Objective   See Exercise, Manual, and Modality Logs for complete treatment.       Assessment & Plan       Assessment  Assessment details: Pt completed treatment with no c/o pain in (R) shoulder.  She continues to be motivated to improve her (R) shoulders strength and ROM.  Pt displays good from on AROM exercises with minimal compensations.  Plan to progress per POC.          Timed:         Manual Therapy:    13     mins  35028;     Therapeutic Exercise:    39     mins  15433;     Neuromuscular Doreen:    0    mins  90073;    Therapeutic Activity:     0     mins  05440;     Gait Trainin     mins  37975;     Ultrasound:     0     mins  45736;    E Stim                            0    mins   46371( g0283)  Work Dougherty/Cond      0    mins   74009        Timed Treatment:   52   mins   Total Treatment:     52   mins    Joni Michaels PTA  [place] License: [id number]

## 2024-12-17 ENCOUNTER — OFFICE VISIT (OUTPATIENT)
Dept: ORTHOPEDIC SURGERY | Facility: CLINIC | Age: 59
End: 2024-12-17
Payer: COMMERCIAL

## 2024-12-17 VITALS — TEMPERATURE: 97.1 F | HEIGHT: 64 IN | BODY MASS INDEX: 46.13 KG/M2 | WEIGHT: 270.2 LBS

## 2024-12-17 DIAGNOSIS — M75.01 ADHESIVE CAPSULITIS OF RIGHT SHOULDER: ICD-10-CM

## 2024-12-17 DIAGNOSIS — Z98.890 S/P ROTATOR CUFF REPAIR: Primary | ICD-10-CM

## 2024-12-17 NOTE — LETTER
December 17, 2024     Patient: Colette Whitlock   YOB: 1965   Date of Visit: 12/17/2024       To Whom It May Concern:    It is my medical opinion that Colette Whitlock can continue to work but with the following restrictions no lifting more than 25 pounds no overhead lifting in a repetitive nature and no throwing I will reevaluate her in 6 weeks.           Sincerely,        Makenzie Barnett MD    CC: No Recipients

## 2024-12-18 ENCOUNTER — TREATMENT (OUTPATIENT)
Dept: PHYSICAL THERAPY | Facility: CLINIC | Age: 59
End: 2024-12-18
Payer: COMMERCIAL

## 2024-12-18 DIAGNOSIS — G89.29 CHRONIC RIGHT SHOULDER PAIN: Primary | ICD-10-CM

## 2024-12-18 DIAGNOSIS — Z98.890 HISTORY OF REPAIR OF RIGHT ROTATOR CUFF: ICD-10-CM

## 2024-12-18 DIAGNOSIS — M25.511 CHRONIC RIGHT SHOULDER PAIN: Primary | ICD-10-CM

## 2024-12-18 NOTE — PROGRESS NOTES
Physical Therapy Daily Treatment Note      [address] [facility], [address], [place] [address]    Patient: Colette Whitlock   : 1965  Referring practitioner: Makenzie Barnett MD  Date of Initial Visit: Type: THERAPY  Noted: 10/14/2024  Today's Date: 2024  Patient seen for 20 sessions         Visit Diagnoses:     ICD-10-CM ICD-9-CM   1. Chronic right shoulder pain  M25.511 719.41    G89.29 338.29   2. History of repair of right rotator cuff  Z98.890 V15.29         Subjective Evaluation    History of Present Illness    Subjective comment: My shoulder is so much better.  It does not really have pain now; it just aches when I use it.       Objective   See Exercise, Manual, and Modality Logs for complete treatment.   *Added end range lift off to shoulder flexion at wall  *Increased reps of shoulder 3-way AROM and 3-way table slides  *Initiated black band BTB IR stretch    Assessment & Plan       Assessment  Assessment details: Patient exhibits improved quality of (R) shoulder PROM flexion, abduction, and ER this date.  There is some mild end range aching but this does not limit patient's tolerance to this activity.  Behind the back shoulder IR stretching is initiated with black band for strong stretch.  Patient benefits from tactile cueing to improve elbow flexion and initial hand placement to achieve true IR stretch across the back.  (R) shoulder PROM IR remains moderately limited with strong capsular end feel.           Progress strengthening /stabilization /functional activity         Timed:  Manual Therapy:    12     mins  27569;  Therapeutic Exercise:    25     mins  98167;     Neuromuscular Doreen:        mins  12481;    Therapeutic Activity:     9     mins  31290;     Gait Training:           mins  78930;     Ultrasound:          mins  66936;    Untimed:  Electrical Stimulation:         mins  18472 ( );  Mechanical Traction:         mins  13683;   Dry Needling              ___  mins    99483    Timed Treatment:   46   mins   Total Treatment:     46   mins    Louise Toscano PT, DPT, OCS  Physical Therapist  [place] License #[id number]  Electronically signed by: Louise Toscano PT, 12/18/24, 1:51 PM EST

## 2024-12-20 ENCOUNTER — TREATMENT (OUTPATIENT)
Dept: PHYSICAL THERAPY | Facility: CLINIC | Age: 59
End: 2024-12-20
Payer: COMMERCIAL

## 2024-12-20 DIAGNOSIS — G89.29 CHRONIC RIGHT SHOULDER PAIN: Primary | ICD-10-CM

## 2024-12-20 DIAGNOSIS — M25.511 CHRONIC RIGHT SHOULDER PAIN: Primary | ICD-10-CM

## 2024-12-20 DIAGNOSIS — Z98.890 HISTORY OF REPAIR OF RIGHT ROTATOR CUFF: ICD-10-CM

## 2024-12-20 NOTE — PROGRESS NOTES
"Physical Therapy Daily Treatment Note               [address] [facility] [address]                                                                                                                                             [place] [address]    Patient: Colette Whitlock   : 1965  Referring practitioner: Makenzie Barnett MD  Date of Initial Visit: Type: THERAPY  Noted: 10/14/2024  Today's Date: 2024  Patient seen for 21 sessions       Visit Diagnoses:    ICD-10-CM ICD-9-CM   1. Chronic right shoulder pain  M25.511 719.41    G89.29 338.29   2. History of repair of right rotator cuff  Z98.890 V15.29       Subjective Evaluation    History of Present Illness    Subjective comment: Pt reports her shoulder is \"good, not 100%, but good\"       Objective   See Exercise, Manual, and Modality Logs for complete treatment.   Added Scapular retractions    Assessment & Plan       Assessment  Assessment details: Pt completed treatment with no c/o pain in (R) shoulder.  Pt has made good progress in increasing her PROM.  She remains compliant with her HEP.  Plan to progress functional shoulder strengthening as tolerated.          Timed:         Manual Therapy:    12     mins  59078;     Therapeutic Exercise:    24     mins  84318;     Neuromuscular Doreen:    0    mins  71636;    Therapeutic Activity:     10     mins  23379;     Gait Trainin     mins  42919;     Ultrasound:     0     mins  84648;    E Stim                            0    mins   45968( g0283)  Work Dougherty/Cond      0    mins   03514        Timed Treatment:   46   mins   Total Treatment:     46   mins    Joni Michaels PTA  [place] License: [id number]  "

## 2024-12-23 ENCOUNTER — TREATMENT (OUTPATIENT)
Dept: PHYSICAL THERAPY | Facility: CLINIC | Age: 59
End: 2024-12-23
Payer: COMMERCIAL

## 2024-12-23 DIAGNOSIS — Z98.890 HISTORY OF REPAIR OF RIGHT ROTATOR CUFF: ICD-10-CM

## 2024-12-23 DIAGNOSIS — G89.29 CHRONIC RIGHT SHOULDER PAIN: Primary | ICD-10-CM

## 2024-12-23 DIAGNOSIS — M25.511 CHRONIC RIGHT SHOULDER PAIN: Primary | ICD-10-CM

## 2024-12-23 PROCEDURE — 97110 THERAPEUTIC EXERCISES: CPT | Performed by: PHYSICAL THERAPIST

## 2024-12-23 PROCEDURE — 97530 THERAPEUTIC ACTIVITIES: CPT | Performed by: PHYSICAL THERAPIST

## 2024-12-23 PROCEDURE — 97140 MANUAL THERAPY 1/> REGIONS: CPT | Performed by: PHYSICAL THERAPIST

## 2024-12-23 NOTE — PROGRESS NOTES
Physical Therapy Daily Treatment Note               [address] [facility] [address]                                                                                                                                             [place] [address]    Patient: Colette Whitlock   : 1965  Referring practitioner: Makenzie Barnett MD  Date of Initial Visit: Type: THERAPY  Noted: 10/14/2024  Today's Date: 2024  Patient seen for 22 sessions       Visit Diagnoses:    ICD-10-CM ICD-9-CM   1. Chronic right shoulder pain  M25.511 719.41    G89.29 338.29   2. History of repair of right rotator cuff  Z98.890 V15.29       Subjective Evaluation    History of Present Illness    Subjective comment: Pt reports that her (R) shoulder has been feel good.       Objective   See Exercise, Manual, and Modality Logs for complete treatment.       Assessment & Plan       Assessment  Assessment details: Pt completed treatment with no c/o pain in (R) shoulder.  She tolerated that addition of weighted resistance to her shoulder AROM flexion/scaption/abd exericses.  She continues to be very motivated to improve her ROM and strength for a full RTW.  Plan to progress per POC.          Timed:         Manual Therapy:    12     mins  13619;     Therapeutic Exercise:    26     mins  05614;     Neuromuscular Doreen:    0    mins  00266;    Therapeutic Activity:     10     mins  90109;     Gait Trainin     mins  45115;     Ultrasound:     0     mins  47422;    E Stim                            0    mins   33178( g0283)  Work Dougherty/Cond      0    mins   30342        Timed Treatment:   48   mins   Total Treatment:     48   mins    Joni Michaels PTA  [place] License: [id number]

## 2024-12-26 ENCOUNTER — TREATMENT (OUTPATIENT)
Dept: PHYSICAL THERAPY | Facility: CLINIC | Age: 59
End: 2024-12-26
Payer: COMMERCIAL

## 2024-12-26 DIAGNOSIS — M25.511 CHRONIC RIGHT SHOULDER PAIN: Primary | ICD-10-CM

## 2024-12-26 DIAGNOSIS — G89.29 CHRONIC RIGHT SHOULDER PAIN: Primary | ICD-10-CM

## 2024-12-26 DIAGNOSIS — Z98.890 HISTORY OF REPAIR OF RIGHT ROTATOR CUFF: ICD-10-CM

## 2024-12-26 NOTE — PROGRESS NOTES
Physical Therapy Daily Treatment Note      [address] [facility], [address], [place] [address]    Patient: Colette Whitlock   : 1965  Referring practitioner: Makenzie Barnett MD  Date of Initial Visit: Type: THERAPY  Noted: 10/14/2024  Today's Date: 2024  Patient seen for 23 sessions         Visit Diagnoses:     ICD-10-CM ICD-9-CM   1. Chronic right shoulder pain  M25.511 719.41    G89.29 338.29   2. History of repair of right rotator cuff  Z98.890 V15.29         Subjective Evaluation    History of Present Illness    Subjective comment: My shoulder is [moving] pretty good; it just feels achey.       Objective   See Exercise, Manual, and Modality Logs for complete treatment.   *Progressed AROM and strengthening activities for (R) UE    Assessment & Plan       Assessment  Assessment details: Patient demonstrates improved ability to reach behind her back with her (R) UE; she is now able to grab her opposite hand behind her back which she has been unable to do since surgery.  She remains functionally limited in externally rotating (R) UE to the back of her head.  She is instructed in performing this as an AAROM activity with assist of (L) UE until AROM improves sufficiently to perform as a strengthening exercise.  She verbalizes understanding.          Progress strengthening /stabilization /functional activity         Timed:  Manual Therapy:    16     mins  31334;  Therapeutic Exercise:    25     mins  15095;     Neuromuscular Doreen:        mins  55450;    Therapeutic Activity:     8     mins  58870;     Gait Training:           mins  76846;     Ultrasound:          mins  72136;    Untimed:  Electrical Stimulation:         mins  53165 ( );  Mechanical Traction:         mins  86321;   Dry Needling              ___  mins   89465    Timed Treatment:   49   mins   Total Treatment:     49   mins    Louise Toscano PT, DPT, OCS  Physical Therapist  [place] License #[id number]  Electronically signed by: Louise  Everton, PT, 12/26/24, 1:34 PM EST

## 2024-12-30 ENCOUNTER — TREATMENT (OUTPATIENT)
Dept: PHYSICAL THERAPY | Facility: CLINIC | Age: 59
End: 2024-12-30
Payer: COMMERCIAL

## 2024-12-30 DIAGNOSIS — M25.511 CHRONIC RIGHT SHOULDER PAIN: Primary | ICD-10-CM

## 2024-12-30 DIAGNOSIS — G89.29 CHRONIC RIGHT SHOULDER PAIN: Primary | ICD-10-CM

## 2024-12-30 DIAGNOSIS — Z98.890 HISTORY OF REPAIR OF RIGHT ROTATOR CUFF: ICD-10-CM

## 2024-12-30 PROCEDURE — 97530 THERAPEUTIC ACTIVITIES: CPT | Performed by: PHYSICAL THERAPIST

## 2024-12-30 PROCEDURE — 97110 THERAPEUTIC EXERCISES: CPT | Performed by: PHYSICAL THERAPIST

## 2024-12-30 PROCEDURE — 97140 MANUAL THERAPY 1/> REGIONS: CPT | Performed by: PHYSICAL THERAPIST

## 2024-12-30 NOTE — PROGRESS NOTES
Physical Therapy Daily Treatment Note       [facility] Physical Therapy           [address] [facility], [address], [place] [id number]    Patient: Colette Whitlock   : 1965  Referring practitioner: Makenzie Barnett MD  Date of Initial Visit: Type: THERAPY  Noted: 10/14/2024  Today's Date: 2024  Patient seen for 24 sessions         Visit Diagnoses:     ICD-10-CM ICD-9-CM   1. Chronic right shoulder pain  M25.511 719.41    G89.29 338.29   2. History of repair of right rotator cuff  Z98.890 V15.29         Subjective Evaluation    History of Present Illness    Subjective comment: The back of my shoulder feels good now/no pain but there is some aching in the front of my shoulder.  I had to do more pulling down motions with my arm at work today.       Objective   See Exercise, Manual, and Modality Logs for complete treatment.   *Reimplemented UBE  *Increased strengthening exercise repetitions as appropriate    Assessment & Plan       Assessment  Assessment details: Patient reports gradual, steady improvements in (R) shoulder symptoms and function, specifically as it relates to her repetitive work tasks.  Her tolerance for these is improving, though she does experience muscular fatigue by end of shift and difficulty with repeated pulling maneuvers from slightly elevated position.  She will benefit from progression of strengthening activities to optimize functional use of (R) UE for ADLs and work tasks.          Other - reassess.         TIMED:  Manual Therapy:    15     mins  26811;  Therapeutic Exercise:    24     mins  76811;     Neuromuscular Doreen:        mins  25630;    Therapeutic Activity:     12     mins  21397;     Gait Training:           mins  40247;     Ultrasound:          mins  22047;    Work Conditioning             mins 52865    UNTIMED:  Electrical Stimulation:         mins  20504 ( );  Dry Needling          mins     Timed Treatment:   51   mins   Total Treatment:     51    mins    Louise Toscano PT, DPT, OCS  Physical Therapist  [place] License #[id number]  Electronically signed by: Louise Toscano PT, 12/30/24, 1:56 PM EST

## 2025-01-02 ENCOUNTER — TREATMENT (OUTPATIENT)
Dept: PHYSICAL THERAPY | Facility: CLINIC | Age: 60
End: 2025-01-02
Payer: COMMERCIAL

## 2025-01-02 DIAGNOSIS — G89.29 CHRONIC RIGHT SHOULDER PAIN: Primary | ICD-10-CM

## 2025-01-02 DIAGNOSIS — M25.511 CHRONIC RIGHT SHOULDER PAIN: Primary | ICD-10-CM

## 2025-01-02 DIAGNOSIS — Z98.890 HISTORY OF REPAIR OF RIGHT ROTATOR CUFF: ICD-10-CM

## 2025-01-02 PROCEDURE — 97112 NEUROMUSCULAR REEDUCATION: CPT | Performed by: PHYSICAL THERAPIST

## 2025-01-02 PROCEDURE — 97530 THERAPEUTIC ACTIVITIES: CPT | Performed by: PHYSICAL THERAPIST

## 2025-01-02 PROCEDURE — 97110 THERAPEUTIC EXERCISES: CPT | Performed by: PHYSICAL THERAPIST

## 2025-01-02 NOTE — PROGRESS NOTES
"Re-Assessment / Re-Certification  [facility] Physical Therapy  [address] [facility], [address], [place] [id number]    Patient: Colette Whitlock   : 1965  Diagnosis/ICD-10 Code:  Chronic right shoulder pain [M25.511, G89.29]  Referring practitioner: Makenzie Barnett MD  Date of Initial Visit: 2025  Today's Date: 2025  Patient seen for 25 sessions      Subjective:   Subjective Questionnaire: QuickDASH: 3.41%  Clinical Progress: improved  Home Program Compliance: Yes  Treatment has included: therapeutic exercise, neuromuscular re-education, manual therapy, and therapeutic activity    Subjective Evaluation    History of Present Illness    Subjective comment: My shoulder is a lot better and 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  There is no pain in the back now but I do have an aching pain in the front and I'm not sure why.  I really feel it after a lot of activity such as at work with a lot of pulling and tying bags and things where I'm pulling with force with my arm.Pain  Current pain ratin  Location: (R) anterior shoulder; \"ache\"         Objective          Active Range of Motion     Right Shoulder   Flexion: 136 degrees   Abduction: 111 (\"tight\") degrees   External rotation 90°: 65 degrees  Internal rotation 90°: 44 degrees     Passive Range of Motion     Right Shoulder   Flexion: WFL  Abduction: WFL  External rotation 90°: WFL  Internal rotation 90°: Right shoulder passive internal rotation at 90 degrees: mild limitation.     Strength/Myotome Testing     Right Shoulder     Planes of Motion   Flexion: 4+   Extension: 4+   Abduction: 4   External rotation at 0°: 4   Internal rotation at 0°: 4+       Assessment & Plan       Assessment  Assessment details: Patient has remained motivated and compliant with PT interventions since her (R) shoulder AYANNA on 2024.  She has reported improvements in (R) shoulder symptoms but a mild to moderate persistent dull aching pain (R) anterior " shoulder.  She demonstrates improved (R) shoulder PROM flexion, abduction, and ER WNL and mild limitation in PROM (R) shoulder IR.  (R) shoulder AROM remains mildly to moderately limited.  (R) shoulder strength remains moderately limited.  Her QuickDASH score has improved to 3.41% indicating a good perceived level of functional improvement.  She will benefit from continued skilled PT services to optimize functional recovery of (R) shoulder for improved QOL.    Goals  Plan Goals: STGs: to be met in 2 weeks  1. Patient will be independent with initial HEP - MET  2. Patient will consistently report minimal to no (R) UE symptoms with ADLs - PARTIALLY MET  3. Patient will maintain (R) shoulder PROM flexion 175 degrees, abduction 180 degrees, ER 85 degrees, and IR 65 degrees for sufficient joint mobility to perform ADLs without compensation - MET  4. Patient will consistently demonstrate 50% reduction in trunk compensation during (R) shoulder elevation - MET     LTGs: to be met in 4 weeks  1. Patient will be independent with progressed HEP - PARTIALLY MET  2. Patient will demonstrate improved (R) shoulder AROM flexion >/= 150 degrees, abduction >/= 140 degrees, and functional IR and ER behind the back and head, respectively, for improved functional reaching ability - NOT MET/IMPROVED  3. Patient will demonstrate improved (R) shoulder strength grossly 4+/5 for improved functional use of surgical (R) UE - PARTIALLY MET/IMPROVED  4. Patient will consistently perform all ADLs and work tasks without compensatory patterns or pain - PARTIALLY MET        Progress toward previous goals: Partially Met   Recommendations: Continue as planned  Timeframe: 1 month  Prognosis to achieve goals: good    PT Signature: Louise Toscano, PT, DPT, OCS  [place] License # [id number]      Based upon review of the patient's progress and continued therapy plan, it is my medical opinion that Colette Whitlock should continue physical therapy treatment at [facility]  THER GISELA MATHEW  [facility] PHYSICAL THERAPY  [address] [place] [address] [address]  [facility] [address]  [phone].    Signature: __________________________________  Makenzie Barnett MD    TIMED:  Manual Therapy:         mins  06478;  Therapeutic Exercise:    26     mins  57669;     Neuromuscular Doreen:    10    mins  43195;    Therapeutic Activity:     12     mins  53091;     Gait Training:           mins  83751;     Ultrasound:          mins  60392;    Work Conditioning             mins 18912    UNTIMED:  Electrical Stimulation:         mins  86178 ( );  Dry Needling          mins 73172    Timed Treatment:   48   mins   Total Treatment:     48   mins    Please sign and return via fax to [phone]. Thank you, [facility] Physical Therapy.

## 2025-01-07 ENCOUNTER — TREATMENT (OUTPATIENT)
Dept: PHYSICAL THERAPY | Facility: CLINIC | Age: 60
End: 2025-01-07
Payer: COMMERCIAL

## 2025-01-07 DIAGNOSIS — G89.29 CHRONIC RIGHT SHOULDER PAIN: Primary | ICD-10-CM

## 2025-01-07 DIAGNOSIS — Z98.890 HISTORY OF REPAIR OF RIGHT ROTATOR CUFF: ICD-10-CM

## 2025-01-07 DIAGNOSIS — M25.511 CHRONIC RIGHT SHOULDER PAIN: Primary | ICD-10-CM

## 2025-01-07 NOTE — PROGRESS NOTES
Physical Therapy Daily Treatment Note               [address]                                                                                                                                             [place] [address]    Patient: Colette Whitlock   : 1965  Referring practitioner: Makenzie Barnett MD  Date of Initial Visit: Type: THERAPY  Noted: 10/14/2024  Today's Date: 2025  Patient seen for 26 sessions       Visit Diagnoses:    ICD-10-CM ICD-9-CM   1. Chronic right shoulder pain  M25.511 719.41    G89.29 338.29   2. History of repair of right rotator cuff  Z98.890 V15.29       Subjective Evaluation    History of Present Illness    Subjective comment: Pt worked today.  She reports a current pain level of 4/10.  She states that she tolerates work with a lower pain level now.       Objective   See Exercise, Manual, and Modality Logs for complete treatment.       Assessment & Plan       Assessment  Assessment details: Pt completed treatment with no c/o pain in (R) shoulder. She continues to be able to progress weighted resistance on her strengthening exercises with no issues.  Plan to begin with her IR stretch at beginning of treatment .  Continue to progress per POC.          Timed:         Manual Therapy:    8     mins  22629;     Therapeutic Exercise:    20     mins  68369;     Neuromuscular Doreen:    9    mins  35987;    Therapeutic Activity:        10  mins  00783;     Gait Trainin     mins  31096;     Ultrasound:     0     mins  27024;    E Stim                            0    mins   43202( g0283)  Work Dougherty/Cond      0    mins   16231        Timed Treatment:   48   mins   Total Treatment:     48   mins    Joni Michaels PTA  [place] License: [id number]

## 2025-01-09 ENCOUNTER — TREATMENT (OUTPATIENT)
Dept: PHYSICAL THERAPY | Facility: CLINIC | Age: 60
End: 2025-01-09
Payer: COMMERCIAL

## 2025-01-09 DIAGNOSIS — M25.511 CHRONIC RIGHT SHOULDER PAIN: Primary | ICD-10-CM

## 2025-01-09 DIAGNOSIS — G89.29 CHRONIC RIGHT SHOULDER PAIN: Primary | ICD-10-CM

## 2025-01-09 DIAGNOSIS — Z98.890 HISTORY OF REPAIR OF RIGHT ROTATOR CUFF: ICD-10-CM

## 2025-01-09 PROCEDURE — 97530 THERAPEUTIC ACTIVITIES: CPT | Performed by: PHYSICAL THERAPIST

## 2025-01-09 PROCEDURE — 97110 THERAPEUTIC EXERCISES: CPT | Performed by: PHYSICAL THERAPIST

## 2025-01-09 PROCEDURE — 97140 MANUAL THERAPY 1/> REGIONS: CPT | Performed by: PHYSICAL THERAPIST

## 2025-01-09 NOTE — PROGRESS NOTES
Physical Therapy Daily Treatment Note       [facility] Physical Therapy           [address], [address], [place] [address]    Patient: Colette Whitlock   : 1965  Referring practitioner: Makenzie Barnett MD  Date of Initial Visit: Type: THERAPY  Noted: 10/14/2024  Today's Date: 2025  Patient seen for 27 sessions         Visit Diagnoses:     ICD-10-CM ICD-9-CM   1. Chronic right shoulder pain  M25.511 719.41    G89.29 338.29   2. History of repair of right rotator cuff  Z98.890 V15.29         Subjective Evaluation    History of Present Illness    Subjective comment: Shoulder is doing pretty good and work was not too busy today so it isn't too [tired].       Objective   See Exercise, Manual, and Modality Logs for complete treatment.       Assessment & Plan       Assessment  Assessment details: Patient remains cooperative and motivated with PT interventions.  Passively, (R) shoulder flexion, abduction, and ER are full; IR is minimally restricted.  Actively, all (R) shoulder planes of motion are moderately limited.  There is insufficient rotator cuff strength in overhead positions of (R) UE and for sustained periods of time.  Her tolerance to repetitive work tasks has greatly improved and these tasks are now not significantly provocative of patient's (R) shoulder symptoms.          Progress per Plan of Care         TIMED:  Manual Therapy:    13     mins  50119;  Therapeutic Exercise:    25     mins  11531;     Neuromuscular Doreen:        mins  48130;    Therapeutic Activity:     11     mins  98289;     Gait Training:           mins  93062;     Ultrasound:          mins  51371;    Work Conditioning             mins 39004    UNTIMED:  Electrical Stimulation:         mins  67828 ( );  Dry Needling          mins     Timed Treatment:   49   mins   Total Treatment:     49   mins    Louise Toscano PT, DPT, OCS  Physical Therapist  [place] License #[id number]  Electronically signed by: Louise  Everton, PT, 01/09/25, 1:51 PM EST

## 2025-01-13 ENCOUNTER — TREATMENT (OUTPATIENT)
Dept: PHYSICAL THERAPY | Facility: CLINIC | Age: 60
End: 2025-01-13
Payer: COMMERCIAL

## 2025-01-13 DIAGNOSIS — M25.511 CHRONIC RIGHT SHOULDER PAIN: Primary | ICD-10-CM

## 2025-01-13 DIAGNOSIS — Z98.890 HISTORY OF REPAIR OF RIGHT ROTATOR CUFF: ICD-10-CM

## 2025-01-13 DIAGNOSIS — G89.29 CHRONIC RIGHT SHOULDER PAIN: Primary | ICD-10-CM

## 2025-01-13 PROCEDURE — 97530 THERAPEUTIC ACTIVITIES: CPT | Performed by: PHYSICAL THERAPIST

## 2025-01-13 PROCEDURE — 97110 THERAPEUTIC EXERCISES: CPT | Performed by: PHYSICAL THERAPIST

## 2025-01-13 PROCEDURE — 97140 MANUAL THERAPY 1/> REGIONS: CPT | Performed by: PHYSICAL THERAPIST

## 2025-01-13 NOTE — PROGRESS NOTES
"Physical Therapy Daily Treatment Note               [address]                                                                                                                                             [place] [address]    Patient: Colette Whitlock   : 1965  Referring practitioner: Makenzie Barnett MD  Date of Initial Visit: Type: THERAPY  Noted: 10/14/2024  Today's Date: 2025  Patient seen for 28 sessions       Visit Diagnoses:    ICD-10-CM ICD-9-CM   1. Chronic right shoulder pain  M25.511 719.41    G89.29 338.29   2. History of repair of right rotator cuff  Z98.890 V15.29       Subjective Evaluation    History of Present Illness    Subjective comment: Pt reprts that her (R) shoulder is \"just achy.\"       Objective   See Exercise, Manual, and Modality Logs for complete treatment.   Added body blade    Assessment & Plan       Assessment  Assessment details: Pt completed treatment with no c/o pain in (R) shoulder.  She was able to progress weight on shlder AROM 3 way.  Pt tolerated the addition of body blade for improved muscular endurance.  PROM remain tight in IR.  Plan to progress per POC.          Timed:         Manual Therapy:    9     mins  15250;     Therapeutic Exercise:    30     mins  38110;     Neuromuscular Doreen:    0    mins  04459;    Therapeutic Activity:     16     mins  43483;     Gait Trainin     mins  88835;     Ultrasound:     0     mins  25675;    E Stim                            0    mins   24789( g0283)  Work Dougherty/Cond      0    mins   31316        Timed Treatment:   55   mins   Total Treatment:     55   mins    Joni Michaels PTA  [place] License: [id number]  "

## 2025-01-14 NOTE — PROGRESS NOTES
Shoulder manipulation follow up  Patient: Colette Whitlock        YOB: 1965      Chief Complaints: shoulder pain right      History of Present Illness: Pt is here f/u shoulder manipulation.  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    cetirizine (zyrTEC) 10 MG tablet Take 1 tablet by mouth Daily. (Patient taking differently: Take 1 tablet by mouth Daily As Needed.)    fluticasone (FLONASE) 50 MCG/ACT nasal spray 2 sprays into the nostril(s) as directed by provider Daily. (Patient taking differently: Administer 2 sprays into the nostril(s) as directed by provider Daily As Needed.)    HYDROcodone-acetaminophen (NORCO) 5-325 MG per tablet Take 1-2 tablets by mouth Every 4 (Four) Hours As Needed (Pain).    ketotifen (ZADITOR) 0.025 % ophthalmic solution Apply 1 drop to eye(s) as directed by provider Every 12 (Twelve) Hours As Needed.    meloxicam (MOBIC) 15 MG tablet Take 1 tablet by mouth Daily.    montelukast (SINGULAIR) 10 MG tablet Take 1 tablet by mouth every night at bedtime. (Patient taking differently: Take 1 tablet by mouth Daily As Needed.)    NON FORMULARY Apply  topically to the appropriate area as directed As Needed (pain relief). Menthyl     No current facility-administered medications on file prior to visit.     Current Medications:  Scheduled Meds:  Continuous Infusions:No current facility-administered medications for this visit.    PRN Meds:.          Physical Exam: 59 y.o.  female  General Appearance:    Alert, cooperative, in no acute distress                 There were no vitals filed for this visit.   Patient is alert and oriented ×3 no acute distress normal mood physical exam.  Physical exam of the shoulder, incisions looked good there is no erythema,no signs or sx of infection.  Physical exam of the right shoulder reveals no overlying skin changes no lymphedema no lymphadenopathy.  Patient has active flexion 170 with mild symptoms abduction is similar external rotation is to 50 and internal rotation to the lower lumbar spine with mild symptoms.  Patient has good rotator cuff strength 4+ over 5 with isometric strength testing with pain.  Patient has a positive impingement and a positive De Santiago sign.  Patient has good cervical range of motion which is full and asymptomatic no radicular symptoms.  Patient has a normal elbow exam.  Good distal pulses are present  Patient has pain with overhead activity and a positive Neer sign and a positive empty can sign , a positive drop arm and a definitive painful arc   Assessment  S/P shoulder manipulation and status post rotator cuff repair I think she is doing great I think her current ranges all she needs for her job I still want her to work on the last bit of range of motion we discussed ways to do that as long as she is doing well she can follow-up as needed      Plan: To continue physical therapy, continue working on range of motion and strength as above

## 2025-01-16 ENCOUNTER — TREATMENT (OUTPATIENT)
Dept: PHYSICAL THERAPY | Facility: CLINIC | Age: 60
End: 2025-01-16
Payer: COMMERCIAL

## 2025-01-16 DIAGNOSIS — Z98.890 HISTORY OF REPAIR OF RIGHT ROTATOR CUFF: ICD-10-CM

## 2025-01-16 DIAGNOSIS — M25.511 CHRONIC RIGHT SHOULDER PAIN: Primary | ICD-10-CM

## 2025-01-16 DIAGNOSIS — G89.29 CHRONIC RIGHT SHOULDER PAIN: Primary | ICD-10-CM

## 2025-01-16 PROCEDURE — 97112 NEUROMUSCULAR REEDUCATION: CPT | Performed by: PHYSICAL THERAPIST

## 2025-01-16 PROCEDURE — 97110 THERAPEUTIC EXERCISES: CPT | Performed by: PHYSICAL THERAPIST

## 2025-01-16 PROCEDURE — 97140 MANUAL THERAPY 1/> REGIONS: CPT | Performed by: PHYSICAL THERAPIST

## 2025-01-16 NOTE — PROGRESS NOTES
Physical Therapy Daily Treatment Note       [facility] Physical Therapy           [address], [address], [place] [address]    Patient: Colette Whitlock   : 1965  Referring practitioner: Makenzie Barnett MD  Date of Initial Visit: Type: THERAPY  Noted: 10/14/2024  Today's Date: 2025  Patient seen for 29 sessions         Visit Diagnoses:     ICD-10-CM ICD-9-CM   1. Chronic right shoulder pain  M25.511 719.41    G89.29 338.29   2. History of repair of right rotator cuff  Z98.890 V15.29         Subjective Evaluation    History of Present Illness    Subjective comment: I was able to reach up into a [cabinet] this morning that I haven't been able to for a really long time.  I was so happy.  My shoulder is like 80% better.       Objective   See Exercise, Manual, and Modality Logs for complete treatment.       Assessment & Plan       Assessment  Assessment details: Patient exhibits mild limitation in (R) shoulder PROM IR but remaining planes are WNL.  There is no significant pain accompanying (R) shoulder PROM IR end range.  She reports improving functional reaching ability with (R) UE such as the ability to reach up into her cabinets overhead using just her (R) UE.  She has some difficulty with motor coordination activities of (R) UE due to moderate (R) UE weakness and delayed onset muscular recruitment.          Progress per Plan of Care         TIMED:  Manual Therapy:    16     mins  08811;  Therapeutic Exercise:    25     mins  83474;     Neuromuscular Doreen:    10    mins  76830;    Therapeutic Activity:          mins  84829;     Gait Training:           mins  16394;     Ultrasound:          mins  17040;    Work Conditioning             mins 46105    UNTIMED:  Electrical Stimulation:         mins  75680 ( );  Dry Needling          mins 19442    Timed Treatment:   51   mins   Total Treatment:     57   mins    Louise Toscano, PT, DPT, OCS  Physical Therapist  [place] License  #[id number]  Electronically signed by: Louise Toscano PT, 01/16/25, 1:32 PM EST

## 2025-01-21 ENCOUNTER — TREATMENT (OUTPATIENT)
Dept: PHYSICAL THERAPY | Facility: CLINIC | Age: 60
End: 2025-01-21
Payer: COMMERCIAL

## 2025-01-21 DIAGNOSIS — M25.511 CHRONIC RIGHT SHOULDER PAIN: Primary | ICD-10-CM

## 2025-01-21 DIAGNOSIS — Z98.890 HISTORY OF REPAIR OF RIGHT ROTATOR CUFF: ICD-10-CM

## 2025-01-21 DIAGNOSIS — G89.29 CHRONIC RIGHT SHOULDER PAIN: Primary | ICD-10-CM

## 2025-01-21 PROCEDURE — 97140 MANUAL THERAPY 1/> REGIONS: CPT | Performed by: PHYSICAL THERAPIST

## 2025-01-21 PROCEDURE — 97110 THERAPEUTIC EXERCISES: CPT | Performed by: PHYSICAL THERAPIST

## 2025-01-21 PROCEDURE — 97112 NEUROMUSCULAR REEDUCATION: CPT | Performed by: PHYSICAL THERAPIST

## 2025-01-21 NOTE — PROGRESS NOTES
Physical Therapy Daily Treatment Note               [address]                                                                                                                                             [place] [address]    Patient: Colette Whitlock   : 1965  Referring practitioner: Makenzie Barnett MD  Date of Initial Visit: Type: THERAPY  Noted: 10/14/2024  Today's Date: 2025  Patient seen for 30 sessions       Visit Diagnoses:    ICD-10-CM ICD-9-CM   1. Chronic right shoulder pain  M25.511 719.41    G89.29 338.29   2. History of repair of right rotator cuff  Z98.890 V15.29       Subjective Evaluation    History of Present Illness    Subjective comment: Pt denies any (R) shoulder pain today.       Objective   See Exercise, Manual, and Modality Logs for complete treatment.       Assessment & Plan       Assessment  Assessment details: Pt completed treatment with no c/o pain in her (R) shoulder. Pt remains very motivated to improve her (R) shoulder strength and ROM for improved ADL's.  Plan to continue to progress per POC.          Timed:         Manual Therapy:    10     mins  34224;     Therapeutic Exercise:    29     mins  13284;     Neuromuscular Doreen:    10    mins  95075;    Therapeutic Activity:     0     mins  37318;     Gait Trainin     mins  94640;     Ultrasound:     0     mins  67654;    Work Dougherty/Cond      0    mins   02046    Untimed:  E Stim                            0    mins   97591( g0283)    Timed Treatment:   49   mins   Total Treatment:     49   mins    Joni Michaels PTA  [place] License: [id number]

## 2025-01-23 ENCOUNTER — TREATMENT (OUTPATIENT)
Dept: PHYSICAL THERAPY | Facility: CLINIC | Age: 60
End: 2025-01-23
Payer: COMMERCIAL

## 2025-01-23 DIAGNOSIS — Z98.890 HISTORY OF REPAIR OF RIGHT ROTATOR CUFF: ICD-10-CM

## 2025-01-23 DIAGNOSIS — G89.29 CHRONIC RIGHT SHOULDER PAIN: Primary | ICD-10-CM

## 2025-01-23 DIAGNOSIS — J30.2 SEASONAL ALLERGIES: ICD-10-CM

## 2025-01-23 DIAGNOSIS — M25.511 CHRONIC RIGHT SHOULDER PAIN: Primary | ICD-10-CM

## 2025-01-23 PROCEDURE — 97140 MANUAL THERAPY 1/> REGIONS: CPT | Performed by: PHYSICAL THERAPIST

## 2025-01-23 PROCEDURE — 97112 NEUROMUSCULAR REEDUCATION: CPT | Performed by: PHYSICAL THERAPIST

## 2025-01-23 PROCEDURE — 97110 THERAPEUTIC EXERCISES: CPT | Performed by: PHYSICAL THERAPIST

## 2025-01-23 RX ORDER — FLUTICASONE PROPIONATE 50 MCG
2 SPRAY, SUSPENSION (ML) NASAL DAILY
Qty: 18.2 G | Refills: 3 | Status: SHIPPED | OUTPATIENT
Start: 2025-01-23

## 2025-01-23 NOTE — PROGRESS NOTES
Physical Therapy Daily Treatment Note       [facility] Physical Therapy           [address], [address], [place] [id number]    Patient: Colette Whitlock   : 1965  Referring practitioner: Makenzie Barnett MD  Date of Initial Visit: Type: THERAPY  Noted: 10/14/2024  Today's Date: 2025  Patient seen for 31 sessions         Visit Diagnoses:     ICD-10-CM ICD-9-CM   1. Chronic right shoulder pain  M25.511 719.41    G89.29 338.29   2. History of repair of right rotator cuff  Z98.890 V15.29         Subjective Evaluation    History of Present Illness    Subjective comment: Still some tightness and trouble reaching behind my back still but doing good [overall].  I'm going to keep working on my exercises even after I finish therapy.       Objective   See Exercise, Manual, and Modality Logs for complete treatment.       Assessment & Plan       Assessment  Assessment details: Patient continues to make gradual but steady improvements in functional recovery of (R) UE.  There is mild limitation in (R) shoulder AROM flexion, abduction, and external rotation with moderate limitation in AROM internal rotation.  She is instructed in AAROM with use of (L) UE to assist (R) hand with behind the back stretching as well as passing items behind the back clockwise and counterclockwise to work on recovery of active (R) shoulder internal rotation.            Other - reassess for MD.         TIMED:  Manual Therapy:    12     mins  69339;  Therapeutic Exercise:    30     mins  25445;     Neuromuscular Doreen:    8    mins  09717;    Therapeutic Activity:          mins  12365;     Gait Training:           mins  25521;     Ultrasound:          mins  67003;    Work Conditioning             mins 09023    UNTIMED:  Electrical Stimulation:         mins  54171 ( );  Dry Needling          mins 16179    Timed Treatment:   50   mins   Total Treatment:     50   mins    Louise Toscano, PT, DPT, OCS  Physical Therapist  [place]  License #[id number]  Electronically signed by: Louise Toscano PT, 01/23/25, 2:09 PM EST

## 2025-01-27 ENCOUNTER — TREATMENT (OUTPATIENT)
Dept: PHYSICAL THERAPY | Facility: CLINIC | Age: 60
End: 2025-01-27
Payer: COMMERCIAL

## 2025-01-27 DIAGNOSIS — Z98.890 HISTORY OF REPAIR OF RIGHT ROTATOR CUFF: ICD-10-CM

## 2025-01-27 DIAGNOSIS — G89.29 CHRONIC RIGHT SHOULDER PAIN: Primary | ICD-10-CM

## 2025-01-27 DIAGNOSIS — M25.511 CHRONIC RIGHT SHOULDER PAIN: Primary | ICD-10-CM

## 2025-01-27 PROCEDURE — 97110 THERAPEUTIC EXERCISES: CPT | Performed by: PHYSICAL THERAPIST

## 2025-01-27 PROCEDURE — 97140 MANUAL THERAPY 1/> REGIONS: CPT | Performed by: PHYSICAL THERAPIST

## 2025-01-27 PROCEDURE — 97112 NEUROMUSCULAR REEDUCATION: CPT | Performed by: PHYSICAL THERAPIST

## 2025-01-27 NOTE — PROGRESS NOTES
Physical Therapy Daily Treatment Note       [facility] Physical Therapy           [address], [address], [place] [id number]    Patient: Colette Whitlock   : 1965  Referring practitioner: Makenzie Barnett MD  Date of Initial Visit: Type: THERAPY  Noted: 10/14/2024  Today's Date: 2025  Patient seen for 32 sessions         Visit Diagnoses:     ICD-10-CM ICD-9-CM   1. Chronic right shoulder pain  M25.511 719.41    G89.29 338.29   2. History of repair of right rotator cuff  Z98.890 V15.29         Subjective Evaluation    History of Present Illness    Subjective comment: My shoulder is doing really good.  It's moving so much better and does not hurt as much.  I still have some trouble getting my arm behind my back and a little trouble getting my hand all the way behind my head. I think it's 80% better.  I was able to reach up into a cabinet with just my R arm that I have not been able to since before surgery.       Objective          Active Range of Motion     Right Shoulder   Flexion: 140 degrees   Abduction: 125 degrees   External rotation BTH: Active external rotation behind the head: posterior (R) aspect of occiput, slowly with effort.   Internal rotation BTB: Active internal rotation behind the back: (R) shoulder functional IR to (R) lateral aspect of iliac crest only.     Additional Active Range of Motion Details  (R) shoulder AROM supine flexion 160 degrees    Passive Range of Motion     Right Shoulder   Flexion: 175 degrees   Abduction: 180 degrees   External rotation 90°: 90 degrees   Internal rotation 90°: 80 degrees       See Exercise, Manual, and Modality Logs for complete treatment.   *Increased UBE resistance to lvl 2    Assessment & Plan       Assessment  Assessment details: Patient has remained motivated and compliant with PT attendance and interventions.  She has attended 21 PT sessions since undergoing (R) shoulder AYANNA on 2024.  She reports good improvement in her  pain and functional use of (R) shoulder.  She feels 80% improved.  She is able to reach up into cabinets now using just her (R) UE which she had not been able to do since before surgery.  (R) shoulder PROM flexion, abduction, ER, and IR are all WNL.  Mild limitations in (R) shoulder AROM flexion and ER persist as do moderate limitation of (R) shoulder AROM ER and significant limitation of (R) shoulder AROM IR behind the back.  She is currently pleased with the status of her (R) shoulder and feels she is appropriate to transition to independence with HEP at this time.  Will await surgeon assessment regarding patient status and outcome.           Awaiting MD orders         TIMED:  Manual Therapy:    13     mins  05653;  Therapeutic Exercise:    25     mins  43199;     Neuromuscular Doreen:    10    mins  19395;    Therapeutic Activity:          mins  08071;     Gait Training:           mins  00189;     Ultrasound:          mins  34254;    Work Conditioning             mins 44434    UNTIMED:  Electrical Stimulation:         mins  38364 (MC );  Dry Needling          mins 29074    Timed Treatment:   48   mins   Total Treatment:     48   mins    Louise Toscano PT, DPT, OCS  Physical Therapist  [place] License #[id number]  Electronically signed by: Louise Toscano PT, 01/27/25, 1:58 PM EST

## 2025-01-28 ENCOUNTER — OFFICE VISIT (OUTPATIENT)
Dept: ORTHOPEDIC SURGERY | Facility: CLINIC | Age: 60
End: 2025-01-28
Payer: COMMERCIAL

## 2025-01-28 VITALS — HEIGHT: 64 IN | BODY MASS INDEX: 46.61 KG/M2 | TEMPERATURE: 98.2 F | WEIGHT: 273 LBS

## 2025-01-28 DIAGNOSIS — M75.01 ADHESIVE CAPSULITIS OF RIGHT SHOULDER: ICD-10-CM

## 2025-01-28 DIAGNOSIS — Z98.890 S/P ROTATOR CUFF REPAIR: Primary | ICD-10-CM

## 2025-02-21 ENCOUNTER — OFFICE VISIT (OUTPATIENT)
Dept: FAMILY MEDICINE CLINIC | Facility: CLINIC | Age: 60
End: 2025-02-21
Payer: COMMERCIAL

## 2025-02-21 VITALS
SYSTOLIC BLOOD PRESSURE: 136 MMHG | WEIGHT: 270 LBS | OXYGEN SATURATION: 98 % | HEIGHT: 64 IN | RESPIRATION RATE: 18 BRPM | HEART RATE: 74 BPM | BODY MASS INDEX: 46.1 KG/M2 | DIASTOLIC BLOOD PRESSURE: 88 MMHG

## 2025-02-21 DIAGNOSIS — Z13.228 SCREENING FOR METABOLIC DISORDER: ICD-10-CM

## 2025-02-21 DIAGNOSIS — Z13.220 SCREENING, LIPID: ICD-10-CM

## 2025-02-21 DIAGNOSIS — Z00.00 ANNUAL PHYSICAL EXAM: Primary | ICD-10-CM

## 2025-02-21 DIAGNOSIS — R53.83 OTHER FATIGUE: ICD-10-CM

## 2025-02-21 PROCEDURE — 99396 PREV VISIT EST AGE 40-64: CPT | Performed by: NURSE PRACTITIONER

## 2025-02-21 NOTE — PROGRESS NOTES
Preventive Exam    History of Present Illness: Colette Whitlock is a 60 y.o. here for check up and review of routine health maintenance. she states she is doing well and has no concerns.    Past medical history, surgical history and family history have been reviewed.     History of Present Illness  The patient presents for evaluation of nocturia, shoulder pain, and health maintenance.    She reports experiencing frequent urination, particularly at night, necessitating more than 5 bathroom visits. She does not experience any urinary urgency or incontinence. She maintains a high water intake as a protective measure for her kidneys.    She has been managing her shoulder pain through exercise, which has resulted in some improvement.    Her last mammogram was conducted in 2024, with the next one due in July 2025. She is current with her colorectal cancer screening. She had a dental check-up in December 2024. She has not had a recent eye examination and does not use corrective lenses, although she uses reading glasses intermittently. She reports no symptoms of chest tightness, cough, shortness of breath, chest pain, leg swelling, palpitations, abdominal pain, constipation, diarrhea, nausea, vomiting, pelvic pain or pressure, abnormal vaginal bleeding or discharge, joint pain, dizziness, depression, or anxiety. She occasionally experiences headaches but does not consider them problematic. Her sleep pattern is normal. She has been under the care of Dr. Mcclendon for cholesterol management, with whom she had 2 consultations when her cholesterol levels were elevated. She has been using a nasal spray as needed and has requested a refill.       Review of Systems   Constitutional:  Positive for fatigue. Negative for appetite change, chills and fever.   HENT:  Negative for congestion, dental problem, facial swelling, sinus pressure and sore throat.         Dental exam is up to date.    Eyes: Negative.  Negative for blurred  vision, double vision and photophobia.        Wears readers. Eye exam is due.    Respiratory: Negative.  Negative for cough, chest tightness, shortness of breath and wheezing.    Cardiovascular:  Negative for chest pain, palpitations and leg swelling.   Gastrointestinal:  Negative for abdominal pain, constipation, diarrhea, nausea and vomiting.   Endocrine: Negative.  Negative for cold intolerance and heat intolerance.   Genitourinary:  Positive for frequency. Negative for breast discharge, breast lump, breast pain, menstrual problem, pelvic pain, pelvic pressure, vaginal bleeding and vaginal discharge.   Musculoskeletal:  Negative for arthralgias, back pain, joint swelling and myalgias.   Skin: Negative.  Negative for color change, pallor and skin lesions.   Allergic/Immunologic: Negative.  Negative for environmental allergies and food allergies.   Neurological:  Negative for dizziness, weakness, numbness and headache.   Hematological: Negative.  Does not bruise/bleed easily.   Psychiatric/Behavioral: Negative.  Negative for behavioral problems, sleep disturbance, suicidal ideas and depressed mood. The patient is not nervous/anxious.        PHYSICAL EXAM    Vitals:    02/21/25 1515   BP: 136/88   Pulse: 74   Resp: 18   SpO2: 98%       Body mass index is 46.35 kg/m².           Physical Exam  Vitals and nursing note reviewed.   Constitutional:       Appearance: Normal appearance. She is well-developed. She is obese.   HENT:      Head: Normocephalic and atraumatic.      Right Ear: Tympanic membrane, ear canal and external ear normal.      Left Ear: Tympanic membrane, ear canal and external ear normal.      Nose: Nose normal.      Mouth/Throat:      Lips: Pink.      Mouth: Mucous membranes are moist.      Tongue: No lesions.      Palate: No mass and lesions.      Pharynx: Oropharynx is clear. Uvula midline.      Tonsils: No tonsillar exudate.   Eyes:      Conjunctiva/sclera: Conjunctivae normal.      Pupils: Pupils are  equal, round, and reactive to light.   Neck:      Thyroid: No thyromegaly.   Cardiovascular:      Rate and Rhythm: Normal rate and regular rhythm.      Pulses: Normal pulses.           Dorsalis pedis pulses are 2+ on the right side and 2+ on the left side.        Posterior tibial pulses are 2+ on the right side and 2+ on the left side.      Heart sounds: Normal heart sounds. No murmur heard.  Pulmonary:      Effort: Pulmonary effort is normal.      Breath sounds: Normal breath sounds.   Abdominal:      General: Bowel sounds are normal. There is no distension.      Palpations: Abdomen is soft.      Tenderness: There is no abdominal tenderness.   Musculoskeletal:         General: No deformity. Normal range of motion.      Cervical back: Normal range of motion and neck supple.      Right lower leg: No edema.      Left lower leg: No edema.   Lymphadenopathy:      Head:      Right side of head: No submental, submandibular, tonsillar, preauricular, posterior auricular or occipital adenopathy.      Left side of head: No submental, submandibular, tonsillar, preauricular, posterior auricular or occipital adenopathy.      Cervical: No cervical adenopathy.      Right cervical: No superficial, deep or posterior cervical adenopathy.     Left cervical: No superficial, deep or posterior cervical adenopathy.      Upper Body:      Right upper body: No supraclavicular adenopathy.      Left upper body: No supraclavicular adenopathy.   Skin:     General: Skin is warm and dry.      Capillary Refill: Capillary refill takes 2 to 3 seconds.   Neurological:      General: No focal deficit present.      Mental Status: She is alert and oriented to person, place, and time.      Cranial Nerves: No cranial nerve deficit.      Sensory: Sensation is intact.      Motor: Motor function is intact.      Coordination: Coordination is intact.      Gait: Gait is intact.   Psychiatric:         Attention and Perception: Attention and perception normal.          Mood and Affect: Mood and affect normal.         Speech: Speech normal.         Behavior: Behavior normal. Behavior is cooperative.         Thought Content: Thought content normal.         Cognition and Memory: Cognition and memory normal.         Judgment: Judgment normal.         Results         Procedures    Diagnoses and all orders for this visit:    1. Annual physical exam (Primary)    2. Screening, lipid  -     Lipid Panel w/ Chol/HDL Ratio    3. Screening for metabolic disorder  -     Comprehensive metabolic panel    4. Other fatigue  -     CBC w AUTO Differential           1. Nocturia.  She reports frequent urination, especially during the night, getting up more than 5 times. There is no urgency or incontinence reported.    2. Shoulder pain.  She reports that her shoulder pain is improving with exercises.    3. Health maintenance.  She had a mammogram last year and is due for another in July. She is up to date on colorectal cancer screening. She has not had an eye exam recently and does not wear glasses or contacts, except for reading glasses.       Problems Addressed this Visit    None  Visit Diagnoses       Annual physical exam    -  Primary    Screening, lipid        Relevant Orders    Lipid Panel w/ Chol/HDL Ratio    Screening for metabolic disorder        Relevant Orders    Comprehensive metabolic panel    Other fatigue        Relevant Orders    CBC w AUTO Differential          Diagnoses         Codes Comments    Annual physical exam    -  Primary ICD-10-CM: Z00.00  ICD-9-CM: V70.0     Screening, lipid     ICD-10-CM: Z13.220  ICD-9-CM: V77.91     Screening for metabolic disorder     ICD-10-CM: Z13.228  ICD-9-CM: V77.99     Other fatigue     ICD-10-CM: R53.83  ICD-9-CM: 780.79           CBC  CMP  Lipid panel     Routine health maintenance reviewed and discussed with Colette Whitlock.    Preventative counseling regarding healthy diet and exercise.   Pt reports that he wears a seatbelt regularly.      Patient or patient representative verbalized consent for the use of Ambient Listening during the visit with  DWAINE Gray for chart documentation. 2/21/2025  15:41 EST    Return in about 1 year (around 2/21/2026) for Annual, Labs.

## 2025-02-22 LAB
ALBUMIN SERPL-MCNC: 4.4 G/DL (ref 3.5–5.2)
ALBUMIN/GLOB SERPL: 1.7 G/DL
ALP SERPL-CCNC: 113 U/L (ref 39–117)
ALT SERPL-CCNC: 13 U/L (ref 1–33)
AST SERPL-CCNC: 13 U/L (ref 1–32)
BASOPHILS # BLD AUTO: 0.02 10*3/MM3 (ref 0–0.2)
BASOPHILS NFR BLD AUTO: 0.3 % (ref 0–1.5)
BILIRUB SERPL-MCNC: 0.3 MG/DL (ref 0–1.2)
BUN SERPL-MCNC: 25 MG/DL (ref 8–23)
BUN/CREAT SERPL: 32.1 (ref 7–25)
CALCIUM SERPL-MCNC: 9.3 MG/DL (ref 8.6–10.5)
CHLORIDE SERPL-SCNC: 104 MMOL/L (ref 98–107)
CHOLEST SERPL-MCNC: 228 MG/DL (ref 0–200)
CHOLEST/HDLC SERPL: 3.8 {RATIO}
CO2 SERPL-SCNC: 26.4 MMOL/L (ref 22–29)
CREAT SERPL-MCNC: 0.78 MG/DL (ref 0.57–1)
EGFRCR SERPLBLD CKD-EPI 2021: 87.1 ML/MIN/1.73
EOSINOPHIL # BLD AUTO: 0.13 10*3/MM3 (ref 0–0.4)
EOSINOPHIL NFR BLD AUTO: 1.8 % (ref 0.3–6.2)
ERYTHROCYTE [DISTWIDTH] IN BLOOD BY AUTOMATED COUNT: 13.6 % (ref 12.3–15.4)
GLOBULIN SER CALC-MCNC: 2.6 GM/DL
GLUCOSE SERPL-MCNC: 84 MG/DL (ref 65–99)
HCT VFR BLD AUTO: 39.4 % (ref 34–46.6)
HDLC SERPL-MCNC: 60 MG/DL (ref 40–60)
HGB BLD-MCNC: 12.6 G/DL (ref 12–15.9)
IMM GRANULOCYTES # BLD AUTO: 0.01 10*3/MM3 (ref 0–0.05)
IMM GRANULOCYTES NFR BLD AUTO: 0.1 % (ref 0–0.5)
LDLC SERPL CALC-MCNC: 151 MG/DL (ref 0–100)
LYMPHOCYTES # BLD AUTO: 3.09 10*3/MM3 (ref 0.7–3.1)
LYMPHOCYTES NFR BLD AUTO: 41.7 % (ref 19.6–45.3)
MCH RBC QN AUTO: 25.8 PG (ref 26.6–33)
MCHC RBC AUTO-ENTMCNC: 32 G/DL (ref 31.5–35.7)
MCV RBC AUTO: 80.7 FL (ref 79–97)
MONOCYTES # BLD AUTO: 0.66 10*3/MM3 (ref 0.1–0.9)
MONOCYTES NFR BLD AUTO: 8.9 % (ref 5–12)
NEUTROPHILS # BLD AUTO: 3.5 10*3/MM3 (ref 1.7–7)
NEUTROPHILS NFR BLD AUTO: 47.2 % (ref 42.7–76)
NRBC BLD AUTO-RTO: 0 /100 WBC (ref 0–0.2)
PLATELET # BLD AUTO: 261 10*3/MM3 (ref 140–450)
POTASSIUM SERPL-SCNC: 4.1 MMOL/L (ref 3.5–5.2)
PROT SERPL-MCNC: 7 G/DL (ref 6–8.5)
RBC # BLD AUTO: 4.88 10*6/MM3 (ref 3.77–5.28)
SODIUM SERPL-SCNC: 141 MMOL/L (ref 136–145)
TRIGL SERPL-MCNC: 97 MG/DL (ref 0–150)
VLDLC SERPL CALC-MCNC: 17 MG/DL (ref 5–40)
WBC # BLD AUTO: 7.41 10*3/MM3 (ref 3.4–10.8)

## 2025-02-24 DIAGNOSIS — R79.9 ELEVATED BUN: Primary | ICD-10-CM

## 2025-03-02 DIAGNOSIS — J30.2 SEASONAL ALLERGIES: ICD-10-CM

## 2025-03-03 RX ORDER — CETIRIZINE HYDROCHLORIDE 10 MG/1
10 TABLET ORAL DAILY
Qty: 90 TABLET | Refills: 1 | Status: SHIPPED | OUTPATIENT
Start: 2025-03-03

## 2025-03-06 ENCOUNTER — OFFICE VISIT (OUTPATIENT)
Dept: FAMILY MEDICINE CLINIC | Facility: CLINIC | Age: 60
End: 2025-03-06
Payer: COMMERCIAL

## 2025-03-06 VITALS
WEIGHT: 260 LBS | OXYGEN SATURATION: 97 % | DIASTOLIC BLOOD PRESSURE: 84 MMHG | SYSTOLIC BLOOD PRESSURE: 142 MMHG | HEART RATE: 79 BPM | RESPIRATION RATE: 18 BRPM | TEMPERATURE: 97.8 F | HEIGHT: 64 IN | BODY MASS INDEX: 44.39 KG/M2

## 2025-03-06 DIAGNOSIS — N30.01 ACUTE CYSTITIS WITH HEMATURIA: Primary | ICD-10-CM

## 2025-03-06 DIAGNOSIS — K59.01 SLOW TRANSIT CONSTIPATION: ICD-10-CM

## 2025-03-06 DIAGNOSIS — G44.209 ACUTE NON INTRACTABLE TENSION-TYPE HEADACHE: ICD-10-CM

## 2025-03-06 LAB
BILIRUB BLD-MCNC: NEGATIVE MG/DL
CLARITY, POC: CLEAR
COLOR UR: YELLOW
GLUCOSE UR STRIP-MCNC: NEGATIVE MG/DL
KETONES UR QL: ABNORMAL
LEUKOCYTE EST, POC: ABNORMAL
NITRITE UR-MCNC: NEGATIVE MG/ML
PH UR: 5 [PH] (ref 5–8)
PROT UR STRIP-MCNC: NEGATIVE MG/DL
RBC # UR STRIP: ABNORMAL /UL
SP GR UR: 1.02 (ref 1–1.03)
UROBILINOGEN UR QL: NORMAL

## 2025-03-06 PROCEDURE — 99213 OFFICE O/P EST LOW 20 MIN: CPT | Performed by: NURSE PRACTITIONER

## 2025-03-06 PROCEDURE — 81002 URINALYSIS NONAUTO W/O SCOPE: CPT | Performed by: NURSE PRACTITIONER

## 2025-03-06 RX ORDER — SULFAMETHOXAZOLE AND TRIMETHOPRIM 800; 160 MG/1; MG/1
1 TABLET ORAL 2 TIMES DAILY
Qty: 10 TABLET | Refills: 0 | Status: SHIPPED | OUTPATIENT
Start: 2025-03-06 | End: 2025-03-11

## 2025-03-06 NOTE — PROGRESS NOTES
Orly Whitlock is a 60 y.o. female.     History of Present Illness     The following portions of the patient's history were reviewed and updated as appropriate: allergies, current medications, past family history, past medical history, past social history, past surgical history and problem list.       The patient is a 60-year-old female who presents for evaluation of headache, back pain, and groin pain.    She has been experiencing acute low abdominal  pain for the past week, which she attributes to a urinary tract infection. She reports no recent antibiotic use or malodorous urine. Despite maintaining adequate hydration, she experiences dry mouth and fatigue. She reports no fever or hematuria. She expresses concern about her renal function and blood glucose levels. She also notes that her urine was yellow in color yesterday.    She experienced a severe headache yesterday, which was managed with two doses of Tylenol and increased water intake. She continues to experience mild headaches today.    She reports constipation and uses glycerin suppositories for relief. She does not use MiraLAX and does not consume prunes. Her diet includes cooked vegetables. She describes her bowel movements as normal, although slightly hard.    MEDICATIONS  Current: Tylenol         Review of Systems        Current Outpatient Medications:     acetaminophen (TYLENOL) 500 MG tablet, Take 1 tablet by mouth Every 6 (Six) Hours As Needed for Mild Pain., Disp: , Rfl:     cetirizine (zyrTEC) 10 MG tablet, TAKE 1 TABLET BY MOUTH EVERY DAY, Disp: 90 tablet, Rfl: 1    fluticasone (FLONASE) 50 MCG/ACT nasal spray, SPRAY 2 SPRAYS INTO THE NOSTRIL AS DIRECTED BY PROVIDER DAILY., Disp: 18.2 g, Rfl: 3    HYDROcodone-acetaminophen (NORCO) 5-325 MG per tablet, Take 1-2 tablets by mouth Every 4 (Four) Hours As Needed (Pain)., Disp: 40 tablet, Rfl: 0    ketotifen (ZADITOR) 0.025 % ophthalmic solution, Apply 1 drop to eye(s) as directed by  "provider Every 12 (Twelve) Hours As Needed., Disp: , Rfl:     meloxicam (MOBIC) 15 MG tablet, Take 1 tablet by mouth Daily., Disp: 30 tablet, Rfl: 1    montelukast (SINGULAIR) 10 MG tablet, Take 1 tablet by mouth every night at bedtime. (Patient taking differently: Take 1 tablet by mouth Daily As Needed.), Disp: 90 tablet, Rfl: 3    NON FORMULARY, Apply  topically to the appropriate area as directed As Needed (pain relief). Menthyl, Disp: , Rfl:     sulfamethoxazole-trimethoprim (Bactrim DS) 800-160 MG per tablet, Take 1 tablet by mouth 2 (Two) Times a Day for 5 days., Disp: 10 tablet, Rfl: 0    Objective   Physical Exam  Constitutional:       Appearance: Normal appearance. She is obese.   HENT:      Mouth/Throat:      Mouth: Mucous membranes are moist.   Cardiovascular:      Rate and Rhythm: Normal rate and regular rhythm.      Pulses: Normal pulses.   Abdominal:      Tenderness: There is abdominal tenderness. There is left CVA tenderness.   Musculoskeletal:         General: Tenderness present. Normal range of motion.   Neurological:      General: No focal deficit present.      Mental Status: She is alert.         Vitals:    03/06/25 1410   BP:    Pulse:    Resp:    Temp: 97.8 °F (36.6 °C)   SpO2:      Body mass index is 44.63 kg/m².    Procedures    No results found for: \"CMP\", \"BMP\", \"TSH\", \"CBC\", \"HGBA1C\", \"LIPIDINTERP\"                Assessment & Plan   Problems Addressed this Visit    None  Visit Diagnoses       Acute cystitis with hematuria    -  Primary    Relevant Orders    POC Urinalysis Dipstick (Completed)    Urine Culture - Urine, Urine, Clean Catch    Slow transit constipation        Relevant Orders    POC Urinalysis Dipstick (Completed)    Urine Culture - Urine, Urine, Clean Catch          Diagnoses         Codes Comments    Acute cystitis with hematuria    -  Primary ICD-10-CM: N30.01  ICD-9-CM: 595.0     Slow transit constipation     ICD-10-CM: K59.01  ICD-9-CM: 564.01           Orders Placed This " Encounter   Procedures    Urine Culture - Urine, Urine, Clean Catch     Order Specific Question:   Release to patient     Answer:   Routine Release [[id number]]    POC Urinalysis Dipstick     Order Specific Question:   Release to patient     Answer:   Routine Release [[id number]]          1. Urinary Tract Infection (UTI).  The patient reports groin pain and fatigue, with no fever or blood in the urine. Physical examination reveals tenderness in the groin area. Recent blood work shows normal kidney and liver function, and a blood sugar level of 87. A prescription for Bactrim 800 mg/160 mg, to be taken twice daily for 5 days, has been sent to [facility] on [address]. She is advised to start the antibiotic tonight and continue drinking plenty of water. If symptoms worsen, such as fever or feeling very sick, she should contact the clinic or go to the ER.    2. Headache.  The patient reports experiencing a severe headache yesterday, which was partially relieved by taking two Tylenols. She is advised to take Tylenol 500 mg, two tablets at night, and can repeat this twice daily if needed. If the headache worsens, she should seek further medical attention.    3. Constipation.  The patient reports experiencing constipation and uses glycerin suppositories. She is advised to try MiraLAX powder and eat two prunes daily to help regulate bowel movements. Increasing water intake and consuming more vegetables are also recommended.              Education provided in AVS   Return if symptoms worsen or fail to improve.    Patient or patient representative verbalized consent for the use of Ambient Listening during the visit with  DWAINE Agustin for chart documentation. 3/7/2025  14:15 EST

## 2025-03-08 LAB
BACTERIA UR CULT: NORMAL
BACTERIA UR CULT: NORMAL

## 2025-04-10 ENCOUNTER — OFFICE VISIT (OUTPATIENT)
Dept: FAMILY MEDICINE CLINIC | Facility: CLINIC | Age: 60
End: 2025-04-10
Payer: COMMERCIAL

## 2025-04-10 VITALS
BODY MASS INDEX: 44.39 KG/M2 | WEIGHT: 260 LBS | HEART RATE: 75 BPM | TEMPERATURE: 97.8 F | RESPIRATION RATE: 18 BRPM | DIASTOLIC BLOOD PRESSURE: 86 MMHG | SYSTOLIC BLOOD PRESSURE: 138 MMHG | OXYGEN SATURATION: 99 % | HEIGHT: 64 IN

## 2025-04-10 DIAGNOSIS — K59.00 CONSTIPATION, UNSPECIFIED CONSTIPATION TYPE: ICD-10-CM

## 2025-04-10 DIAGNOSIS — R35.0 URINE FREQUENCY: Primary | ICD-10-CM

## 2025-04-10 LAB
BILIRUB BLD-MCNC: NEGATIVE MG/DL
CLARITY, POC: CLEAR
COLOR UR: YELLOW
GLUCOSE UR STRIP-MCNC: NEGATIVE MG/DL
KETONES UR QL: NEGATIVE
LEUKOCYTE EST, POC: ABNORMAL
NITRITE UR-MCNC: NEGATIVE MG/ML
PH UR: 5.5 [PH] (ref 5–8)
PROT UR STRIP-MCNC: NEGATIVE MG/DL
RBC # UR STRIP: ABNORMAL /UL
SP GR UR: 1.01 (ref 1–1.03)
UROBILINOGEN UR QL: NORMAL

## 2025-04-10 PROCEDURE — 81002 URINALYSIS NONAUTO W/O SCOPE: CPT | Performed by: NURSE PRACTITIONER

## 2025-04-10 PROCEDURE — 99213 OFFICE O/P EST LOW 20 MIN: CPT | Performed by: NURSE PRACTITIONER

## 2025-04-10 RX ORDER — NITROFURANTOIN 25; 75 MG/1; MG/1
100 CAPSULE ORAL 2 TIMES DAILY
Qty: 10 CAPSULE | Refills: 0 | Status: SHIPPED | OUTPATIENT
Start: 2025-04-10 | End: 2025-04-15

## 2025-04-10 NOTE — PROGRESS NOTES
Orly Whitlock is a 60 y.o. female.     History of Present Illness     The following portions of the patient's history were reviewed and updated as appropriate: allergies, current medications, past family history, past medical history, past social history, past surgical history and problem list.       The patient is a 60-year-old female who presents for evaluation of dysuria.    Acute UTI symptoms x 1 week, reports back pain, painful urination, and a sensation of heaviness in her bladder. She also reports frequent urination, necessitating bathroom visits every 5 to 10 minutes throughout the night for the past week. Her urine is dark in color. She has not been sexually active for the past 2 years and maintains good personal hygiene, including wearing clean cotton underwear and wiping from front to back after bowel movements. She does not use pads. She has been consuming diet cranberry juice and testing her blood sugar levels at home, which have remained within the normal range.   She also reports constipation, alternating with episodes of diarrhea.       MEDICATIONS  Past: Bactrim         Review of Systems        Current Outpatient Medications:     acetaminophen (TYLENOL) 500 MG tablet, Take 1 tablet by mouth Every 6 (Six) Hours As Needed for Mild Pain., Disp: , Rfl:     cetirizine (zyrTEC) 10 MG tablet, TAKE 1 TABLET BY MOUTH EVERY DAY, Disp: 90 tablet, Rfl: 1    fluticasone (FLONASE) 50 MCG/ACT nasal spray, SPRAY 2 SPRAYS INTO THE NOSTRIL AS DIRECTED BY PROVIDER DAILY., Disp: 18.2 g, Rfl: 3    HYDROcodone-acetaminophen (NORCO) 5-325 MG per tablet, Take 1-2 tablets by mouth Every 4 (Four) Hours As Needed (Pain)., Disp: 40 tablet, Rfl: 0    ketotifen (ZADITOR) 0.025 % ophthalmic solution, Apply 1 drop to eye(s) as directed by provider Every 12 (Twelve) Hours As Needed., Disp: , Rfl:     meloxicam (MOBIC) 15 MG tablet, Take 1 tablet by mouth Daily., Disp: 30 tablet, Rfl: 1    montelukast (SINGULAIR) 10  "MG tablet, Take 1 tablet by mouth every night at bedtime. (Patient taking differently: Take 1 tablet by mouth Daily As Needed.), Disp: 90 tablet, Rfl: 3    nitrofurantoin, macrocrystal-monohydrate, (MACROBID) 100 MG capsule, Take 1 capsule by mouth 2 (Two) Times a Day for 5 days., Disp: 10 capsule, Rfl: 0    NON FORMULARY, Apply  topically to the appropriate area as directed As Needed (pain relief). Menthyl, Disp: , Rfl:     Objective   Physical Exam  Vitals reviewed.   Constitutional:       Appearance: Normal appearance. She is obese.   HENT:      Mouth/Throat:      Mouth: Mucous membranes are moist.   Cardiovascular:      Rate and Rhythm: Normal rate and regular rhythm.      Pulses: Normal pulses.      Heart sounds: Normal heart sounds.   Pulmonary:      Effort: Pulmonary effort is normal.      Breath sounds: Normal breath sounds.   Abdominal:      General: Bowel sounds are normal.      Tenderness: There is abdominal tenderness. There is no right CVA tenderness or left CVA tenderness.   Skin:     General: Skin is warm.   Neurological:      General: No focal deficit present.      Mental Status: She is alert.         Vitals:    04/10/25 1509   BP: 138/86   Pulse: 75   Resp: 18   Temp: 97.8 °F (36.6 °C)   SpO2: 99%     Body mass index is 44.63 kg/m².    Procedures    No results found for: \"CMP\", \"BMP\", \"TSH\", \"CBC\", \"HGBA1C\", \"LIPIDINTERP\"         Over the past 2 weeks, how often have you been bothered by any of the following problems?  Little interest or pleasure in doing things: Not at all  Feeling down, depressed, or hopeless: Not at all      Assessment & Plan   Problems Addressed this Visit    None  Visit Diagnoses         Urine frequency    -  Primary    Relevant Orders    POC Urinalysis Dipstick (Completed)    Urine Culture - Urine, Urine, Clean Catch      Constipation, unspecified constipation type              Diagnoses         Codes Comments      Urine frequency    -  Primary ICD-10-CM: R35.0  ICD-9-CM: " 788.41       Constipation, unspecified constipation type     ICD-10-CM: K59.00  ICD-9-CM: 564.00           Orders Placed This Encounter   Procedures    Urine Culture - Urine, Urine, Clean Catch     Release to patient:   Routine Release [[id number]]    POC Urinalysis Dipstick     Release to patient:   Routine Release [[id number]]          1. Dysuria.  Rx nitrofurantoin 100 mg, to be taken twice daily for 5 days. Her symptoms are indicative of a urinary tract infection (UTI). The presence of blood in her urine may be contributing to its dark coloration. Kidney function appears normal last month on labs.  She has been advised to maintain adequate hydration and incorporate fiber-rich foods such as prunes and vegetables into her diet. Over-the-counter MiraLAX powder can be used as needed. For vaginal hygiene, she should use gentle, unscented soap like Dove and continue to wear cotton underwear. A urine culture will be sent for further analysis. If the urine culture results indicate no infection, a urology referral will be placed. If recurrent UTIs occur, estrogen cream may be prescribed to reduce the frequency of infections.    2. Constipation.  She experiences occasional constipation. She has been advised to increase fiber intake through foods like prunes and vegetables and to use over-the-counter MiraLAX powder as needed.                Education provided in AVS   Return if symptoms worsen or fail to improve.    Patient or patient representative verbalized consent for the use of Ambient Listening during the visit with  DWAINE Agustin for chart documentation. 4/10/2025  15:53 EDT

## 2025-04-12 LAB
BACTERIA UR CULT: NORMAL
BACTERIA UR CULT: NORMAL

## 2025-04-24 NOTE — TELEPHONE ENCOUNTER
Caller: Colette Whitlock    Relationship: Self    Best call back number: [phone]     Which medication are you concerned about: Estrogens Conjugated (PREMARIN) 0.625 MG/GM vaginal cream     What are your concerns: PATIENT CALLING STATING THAT THE PRESCRIPTION WAS SENT TO [facility] SHE WOULD LIKE TO GET THE MEDICATION SENT TO [facility]pharmacy #[id number] - [place], [place] - [address] AT [facility] - [phone]  - [phone]  [phone]

## 2025-04-28 ENCOUNTER — PRIOR AUTHORIZATION (OUTPATIENT)
Dept: FAMILY MEDICINE CLINIC | Facility: CLINIC | Age: 60
End: 2025-04-28
Payer: COMMERCIAL

## 2025-04-28 NOTE — TELEPHONE ENCOUNTER
PA initiated for Estrogens Conjugated (PREMARIN) 0.625 MG/GM vaginal cream via [employer]. Awaiting response.

## 2025-05-01 ENCOUNTER — TELEPHONE (OUTPATIENT)
Dept: FAMILY MEDICINE CLINIC | Facility: CLINIC | Age: 60
End: 2025-05-01

## 2025-05-01 NOTE — TELEPHONE ENCOUNTER
Caller: Colette Whitlock    Relationship: Self    Best call back number: [phone]     What medication are you requesting: ALTERNATIVE FOR PREMARIN CREAM; PATIENT INSURANCE DENIED THE PA      Have you had these symptoms before:    [x] Yes  [] No    Have you been treated for these symptoms before:   [x] Yes  [] No    If a prescription is needed, what is your preferred pharmacy and phone number: [id number] - [place], [place] - [address] AT [facility] [phone] [facility] [phone]      Additional notes:    PLEASE CALL PATIENT TO ADVISE

## 2025-05-02 RX ORDER — ESTRADIOL 0.1 MG/G
2 CREAM VAGINAL DAILY
Qty: 42.5 G | Refills: 3 | Status: SHIPPED | OUTPATIENT
Start: 2025-05-02 | End: 2025-05-04 | Stop reason: SDUPTHER

## 2025-05-04 RX ORDER — ESTRADIOL 0.1 MG/G
2 CREAM VAGINAL DAILY
Qty: 42.5 G | Refills: 3 | Status: SHIPPED | OUTPATIENT
Start: 2025-05-04

## 2025-07-01 ENCOUNTER — OFFICE VISIT (OUTPATIENT)
Dept: FAMILY MEDICINE CLINIC | Facility: CLINIC | Age: 60
End: 2025-07-01
Payer: COMMERCIAL

## 2025-07-01 VITALS
OXYGEN SATURATION: 98 % | BODY MASS INDEX: 40.45 KG/M2 | HEART RATE: 75 BPM | SYSTOLIC BLOOD PRESSURE: 118 MMHG | DIASTOLIC BLOOD PRESSURE: 70 MMHG | HEIGHT: 64 IN | WEIGHT: 236.9 LBS

## 2025-07-01 DIAGNOSIS — G89.29 CHRONIC PAIN OF LEFT KNEE: Primary | ICD-10-CM

## 2025-07-01 DIAGNOSIS — M25.562 CHRONIC PAIN OF LEFT KNEE: Primary | ICD-10-CM

## 2025-07-01 DIAGNOSIS — N95.2 POSTMENOPAUSAL ATROPHIC VAGINITIS: ICD-10-CM

## 2025-07-01 DIAGNOSIS — M17.12 PRIMARY OSTEOARTHRITIS OF LEFT KNEE: ICD-10-CM

## 2025-07-01 PROCEDURE — 99214 OFFICE O/P EST MOD 30 MIN: CPT | Performed by: NURSE PRACTITIONER

## 2025-07-01 RX ORDER — MELOXICAM 15 MG/1
15 TABLET ORAL DAILY
Qty: 30 TABLET | Refills: 1 | Status: SHIPPED | OUTPATIENT
Start: 2025-07-01

## 2025-07-01 RX ORDER — ESTRADIOL 0.1 MG/G
CREAM VAGINAL
Start: 2025-07-01

## 2025-07-01 NOTE — PROGRESS NOTES
Orly Whitlock is a 60 y.o. female.     History of Present Illness     The following portions of the patient's history were reviewed and updated as appropriate: allergies, current medications, past family history, past medical history, past social history, past surgical history and problem list.       The patient is a 60-year-old female who presents for left knee pain.    She has been experiencing bilateral knee pain, with the left knee being more severe. The pain intensifies when she bends her knee, such as when using the toilet. She has a bicycle at home for exercise and has temporarily stopped taking meloxicam. She also uses ice and heat therapy and wears a knee brace. She takes Tylenol every morning and at night if she experiences pain. She has been using Voltaren and castor oil for relief.    Has previously had fluid drainage from her left knee, years ago     Chronic atrophic vaginitis, history of frequent UTIs.  Patient has been using vaginal estrogen.  She was told to stop this after 3 weeks.  She is not sure who told her to stop this medication.  Reassurance provided that she can continue using 2-3 times a week.  No recent UTI.  She reports improved ability to hold her urine and feeling much better on topical estrogen         Review of Systems        Current Outpatient Medications:     acetaminophen (TYLENOL) 500 MG tablet, Take 1 tablet by mouth Every 6 (Six) Hours As Needed for Mild Pain., Disp: , Rfl:     estradiol (ESTRACE) 0.1 MG/GM vaginal cream, Pea sized amount twice a week, Disp: , Rfl:     meloxicam (MOBIC) 15 MG tablet, Take 1 tablet by mouth Daily. Take w food, Disp: 30 tablet, Rfl: 1    NON FORMULARY, Apply  topically to the appropriate area as directed As Needed (pain relief). Menthyl, Disp: , Rfl:     montelukast (SINGULAIR) 10 MG tablet, Take 1 tablet by mouth every night at bedtime. (Patient taking differently: Take 1 tablet by mouth Daily As Needed.), Disp: 90 tablet, Rfl:  "3    Objective   Physical Exam  Vitals reviewed.   Constitutional:       Appearance: She is obese.   Cardiovascular:      Rate and Rhythm: Normal rate.      Pulses: Normal pulses.   Pulmonary:      Effort: Pulmonary effort is normal.   Musculoskeletal:         General: Tenderness present.        Legs:       Comments: Medial left knee tenderness, no laxity noted in joint   Skin:     General: Skin is warm.   Neurological:      General: No focal deficit present.      Mental Status: She is alert.         Vitals:    07/01/25 1454   BP: 118/70   Pulse: 75   SpO2: 98%     Body mass index is 40.66 kg/m².    Procedures    No results found for: \"CMP\", \"BMP\", \"TSH\", \"CBC\", \"HGBA1C\", \"LIPIDINTERP\"                Assessment & Plan   Problems Addressed this Visit       Osteoarthritis of knee    Relevant Medications    meloxicam (MOBIC) 15 MG tablet     Other Visit Diagnoses         Chronic pain of left knee    -  Primary    Relevant Orders    Ambulatory Referral to Orthopedic Surgery      Postmenopausal atrophic vaginitis              Diagnoses         Codes Comments      Chronic pain of left knee    -  Primary ICD-10-CM: M25.562, G89.29  ICD-9-CM: 719.46, 338.29       Primary osteoarthritis of left knee     ICD-10-CM: M17.12  ICD-9-CM: 715.16       Postmenopausal atrophic vaginitis     ICD-10-CM: N95.2  ICD-9-CM: 627.3                1. Left knee pain.  - Reports persistent left knee pain that worsens with bending and weight-bearing activities.  - Has tried various treatments, including Voltaren, castor oil, ice, heat, and a knee brace.  - Referral to Dr. Franklin for a potential knee injection and an x-ray of the left knee has been made.  - Instructed to resume meloxicam 15 mg once daily with food and to continue taking Tylenol as needed for pain relief. Advised against using Advil or ibuprofen.  - Okay to use topical rubs    2. Vaginal dryness.  - Currently using estrogen cream.  - Advised to continue its application twice " weekly, using a pea-sized amount.  - This regimen should be maintained long-term.  - Refills can be provided by Dr. Mcclendon or this provider.       Reviewed last labs GFR 87.1       Education provided in AVS   No follow-ups on file.    Patient or patient representative verbalized consent for the use of Ambient Listening during the visit with  DWAINE Agustin for chart documentation. 7/1/2025  15:23 EDT

## 2025-07-22 ENCOUNTER — OFFICE VISIT (OUTPATIENT)
Dept: ORTHOPEDIC SURGERY | Facility: CLINIC | Age: 60
End: 2025-07-22
Payer: COMMERCIAL

## 2025-07-22 VITALS — HEIGHT: 63 IN | BODY MASS INDEX: 45.23 KG/M2 | WEIGHT: 255.3 LBS | TEMPERATURE: 97.7 F

## 2025-07-22 DIAGNOSIS — M17.12 PRIMARY OSTEOARTHRITIS OF LEFT KNEE: ICD-10-CM

## 2025-07-22 DIAGNOSIS — R52 PAIN: Primary | ICD-10-CM

## 2025-07-22 PROCEDURE — 99214 OFFICE O/P EST MOD 30 MIN: CPT | Performed by: NURSE PRACTITIONER

## 2025-07-22 PROCEDURE — 20610 DRAIN/INJ JOINT/BURSA W/O US: CPT | Performed by: NURSE PRACTITIONER

## 2025-07-22 PROCEDURE — 73562 X-RAY EXAM OF KNEE 3: CPT | Performed by: NURSE PRACTITIONER

## 2025-07-22 RX ORDER — METHYLPREDNISOLONE ACETATE 80 MG/ML
80 INJECTION, SUSPENSION INTRA-ARTICULAR; INTRALESIONAL; INTRAMUSCULAR; SOFT TISSUE
Status: COMPLETED | OUTPATIENT
Start: 2025-07-22 | End: 2025-07-22

## 2025-07-22 RX ADMIN — METHYLPREDNISOLONE ACETATE 80 MG: 80 INJECTION, SUSPENSION INTRA-ARTICULAR; INTRALESIONAL; INTRAMUSCULAR; SOFT TISSUE at 15:42

## 2025-07-22 NOTE — PROGRESS NOTES
Patient: Colette Whitlock  YOB: 1965 60 y.o. female  Medical Record Number: [id number]    Chief Complaints:   Chief Complaint   Patient presents with   • Left Knee - Pain, Initial Evaluation       History of Present Illness:Colette Whitlock is a 60 y.o. female who presents patient both myself as well as the practice with complaints of worsening in left knee pain.  She has a history of osteoarthritis, has had injections many years ago.  Was told several years ago she needed knee replacement.  She reports over the last 3 to 4 months she has had increased pain.  Denies any injury.  She has tried Voltaren, Mobic and Tylenol with minimal improvement    Allergies:   Allergies   Allergen Reactions   • Penicillin G Unknown (See Comments)     Told this as a child       Medications:   Current Outpatient Medications   Medication Sig Dispense Refill   • acetaminophen (TYLENOL) 500 MG tablet Take 1 tablet by mouth Every 6 (Six) Hours As Needed for Mild Pain.     • estradiol (ESTRACE) 0.1 MG/GM vaginal cream Pea sized amount twice a week     • meloxicam (MOBIC) 15 MG tablet Take 1 tablet by mouth Daily. Take w food 30 tablet 1   • montelukast (SINGULAIR) 10 MG tablet Take 1 tablet by mouth every night at bedtime. (Patient taking differently: Take 1 tablet by mouth Daily As Needed.) 90 tablet 3   • NON FORMULARY Apply  topically to the appropriate area as directed As Needed (pain relief). Menthyl       No current facility-administered medications for this visit.         The following portions of the patient's history were reviewed and updated as appropriate: allergies, current medications, past family history, past medical history, past social history, past surgical history and problem list.    Review of Systems:   14 point review of systems was performed. All systems negative except pertinent positives/negatives listed in HPI above    Physical Exam:   Vitals:    07/22/25 1516   Temp: 97.7 °F (36.5 °C)   TempSrc:  "Temporal   Weight: 116 kg (255 lb 4.8 oz)   Height: 158.8 cm (62.5\")   PainSc: 5   Comment: 10/10 at worse   PainLoc: Back       General: A and O x 3, ASA, NAD   Skin clear no unusual lesions noted  Left knee patient has no appreciable effusion 110 degrees flexion neutral in extension positive Nancy negative Lockman calf soft and nontender       Radiology:  Xrays 3views (ap,lateral, sunrise) left knee were ordered and reviewed today secondary to increased pain and show bone-on-bone end-stage osteoarthritis.  No comparative views available    Assessment/Plan: End-stage osteoarthritis left knee with increased pain    Patient I discussed options including conservative measures versus total knee replacement, however the patient's BMI is unfortunately too high at this point will need to be less than 40 in order to proceed instead we will try left knee cortisone injection, she declines physical therapy, we will see her back as needed    Large Joint Arthrocentesis: L knee  Date/Time: 7/22/2025 3:42 PM  Consent given by: patient  Site marked: site marked  Timeout: Immediately prior to procedure a time out was called to verify the correct patient, procedure, equipment, support staff and site/side marked as required   Supporting Documentation  Indications: pain and joint swelling   Procedure Details  Location: knee - L knee  Preparation: Patient was prepped and draped in the usual sterile fashion  Needle size: 22 G  Approach: anterolateral  Medications administered: 80 mg methylPREDNISolone acetate 80 MG/ML; 2 mL lidocaine (cardiac)  Patient tolerance: patient tolerated the procedure well with no immediate complications           Sonja Alberto, APRN  7/22/2025  "

## 2025-08-11 ENCOUNTER — TRANSCRIBE ORDERS (OUTPATIENT)
Dept: ADMINISTRATIVE | Facility: HOSPITAL | Age: 60
End: 2025-08-11
Payer: COMMERCIAL

## 2025-08-11 DIAGNOSIS — Z12.31 SCREENING MAMMOGRAM, ENCOUNTER FOR: Primary | ICD-10-CM

## (undated) DEVICE — GLV SURG BIOGEL LTX PF 6 1/2

## (undated) DEVICE — UNDYED BRAIDED (POLYGLACTIN 910), SYNTHETIC ABSORBABLE SUTURE: Brand: COATED VICRYL

## (undated) DEVICE — TBG ARTHSCP PT W CONN/REDUC 8FT

## (undated) DEVICE — STRIP,CLOSURE,WOUND,MEDI-STRIP,1/2X4: Brand: MEDLINE

## (undated) DEVICE — BLD SHAVER BONECUTTER 5MM 13CM

## (undated) DEVICE — INTENT TO BE USED WITH SUTURE MATERIAL FOR TISSUE CLOSURE: Brand: RICHARD-ALLAN® NEEDLE 1/2 CIRCLE TAPER

## (undated) DEVICE — TBG PENCL TELESCP MEGADYNE SMOKE EVAC 10FT

## (undated) DEVICE — TBG ARTHSCP PUMP W CONN/REDUC 8FT

## (undated) DEVICE — TRAP FLD MINIVAC MEGADYNE 100ML

## (undated) DEVICE — PATIENT RETURN ELECTRODE, SINGLE-USE, CONTACT QUALITY MONITORING, ADULT, WITH 9FT CORD, FOR PATIENTS WEIGING OVER 33LBS. (15KG): Brand: MEGADYNE

## (undated) DEVICE — IRRIGATOR BULB ASEPTO 60CC STRL

## (undated) DEVICE — PK ARTHSCP SHLDR TOWER 40

## (undated) DEVICE — BLANKT WARM LOWR/BDY 100X120CM

## (undated) DEVICE — KT POSTN ARM TRIMANO BEACH CHR W/DRP

## (undated) DEVICE — ABL ASP APOLLO RF XL 90D

## (undated) DEVICE — HYPODERMIC SAFETY NEEDLE: Brand: MONOJECT

## (undated) DEVICE — INTENDED FOR TISSUE SEPARATION, AND OTHER PROCEDURES THAT REQUIRE A SHARP SURGICAL BLADE TO PUNCTURE OR CUT.: Brand: BARD-PARKER ® CARBON RIB-BACK BLADES

## (undated) DEVICE — SYR LUERLOK 5CC

## (undated) DEVICE — SUT VIC 2/0 CT2 27IN J269H

## (undated) DEVICE — SKIN PREP TRAY W/CHG: Brand: MEDLINE INDUSTRIES, INC.

## (undated) DEVICE — ARM SLING: Brand: DEROYAL

## (undated) DEVICE — DRSNG WND GZ CURAD OIL EMULSION 3X3IN STRL

## (undated) DEVICE — POOLE SUCTION HANDLE: Brand: CARDINAL HEALTH